# Patient Record
Sex: MALE | Race: WHITE | NOT HISPANIC OR LATINO | Employment: FULL TIME | URBAN - METROPOLITAN AREA
[De-identification: names, ages, dates, MRNs, and addresses within clinical notes are randomized per-mention and may not be internally consistent; named-entity substitution may affect disease eponyms.]

---

## 2017-02-09 ENCOUNTER — GENERIC CONVERSION - ENCOUNTER (OUTPATIENT)
Dept: OTHER | Facility: OTHER | Age: 52
End: 2017-02-09

## 2017-02-09 LAB
CHOLEST SERPL-MCNC: 130 MG/DL
CREAT UR-MCNC: 145 MG/DL
GLUCOSE SERPL-MCNC: 112 MG/DL
HBA1C MFR BLD HPLC: 6.2 %
HDLC SERPL-MCNC: 54 MG/DL
LDLC SERPL CALC-MCNC: 51 MG/DL
MAGNESIUM, UR (HISTORICAL): 0.5
MICROALBUMIN/CREATININE RATIO (HISTORICAL): 3
TRIGL SERPL-MCNC: 125 MG/DL

## 2017-02-14 ENCOUNTER — ALLSCRIPTS OFFICE VISIT (OUTPATIENT)
Dept: OTHER | Facility: OTHER | Age: 52
End: 2017-02-14

## 2017-05-04 ENCOUNTER — GENERIC CONVERSION - ENCOUNTER (OUTPATIENT)
Dept: OTHER | Facility: OTHER | Age: 52
End: 2017-05-04

## 2017-05-04 LAB
CHOLEST SERPL-MCNC: 112 MG/DL
GLUCOSE SERPL-MCNC: 111 MG/DL
HBA1C MFR BLD HPLC: 6 %
HDLC SERPL-MCNC: 56 MG/DL
LDLC SERPL CALC-MCNC: 43 MG/DL
TRIGL SERPL-MCNC: 66 MG/DL

## 2017-08-29 ENCOUNTER — ALLSCRIPTS OFFICE VISIT (OUTPATIENT)
Dept: OTHER | Facility: OTHER | Age: 52
End: 2017-08-29

## 2017-11-15 ENCOUNTER — ALLSCRIPTS OFFICE VISIT (OUTPATIENT)
Dept: OTHER | Facility: OTHER | Age: 52
End: 2017-11-15

## 2017-11-20 ENCOUNTER — GENERIC CONVERSION - ENCOUNTER (OUTPATIENT)
Dept: OTHER | Facility: OTHER | Age: 52
End: 2017-11-20

## 2017-11-20 LAB
A/G RATIO (HISTORICAL): 2.3 (ref 1.2–2.2)
ALBUMIN SERPL BCP-MCNC: 4.5 G/DL (ref 3.5–5.5)
ALP SERPL-CCNC: 58 IU/L (ref 39–117)
ALT SERPL W P-5'-P-CCNC: 24 IU/L (ref 0–44)
AST SERPL W P-5'-P-CCNC: 15 IU/L (ref 0–40)
BILIRUB SERPL-MCNC: 0.6 MG/DL (ref 0–1.2)
BUN SERPL-MCNC: 16 MG/DL (ref 6–24)
BUN/CREA RATIO (HISTORICAL): 16 (ref 9–20)
CALCIUM SERPL-MCNC: 9.7 MG/DL (ref 8.7–10.2)
CHLORIDE SERPL-SCNC: 101 MMOL/L (ref 96–106)
CHOLEST SERPL-MCNC: 136 MG/DL (ref 100–199)
CO2 SERPL-SCNC: 24 MMOL/L (ref 18–29)
CREAT SERPL-MCNC: 0.98 MG/DL (ref 0.76–1.27)
EGFR AFRICAN AMERICAN (HISTORICAL): 103 ML/MIN/1.73
EGFR-AMERICAN CALC (HISTORICAL): 89 ML/MIN/1.73
GLUCOSE SERPL-MCNC: 112 MG/DL (ref 65–99)
HDLC SERPL-MCNC: 57 MG/DL
INTERPRETATION (HISTORICAL): NORMAL
LDLC SERPL CALC-MCNC: 64 MG/DL (ref 0–99)
POTASSIUM SERPL-SCNC: 4.6 MMOL/L (ref 3.5–5.2)
PROSTATE SPECIFIC ANTIGEN (HISTORICAL): 1.4 NG/ML (ref 0–4)
SODIUM SERPL-SCNC: 140 MMOL/L (ref 134–144)
TOT. GLOBULIN, SERUM (HISTORICAL): 2 G/DL (ref 1.5–4.5)
TOTAL PROTEIN (HISTORICAL): 6.5 G/DL (ref 6–8.5)
TRIGL SERPL-MCNC: 75 MG/DL (ref 0–149)

## 2017-11-22 ENCOUNTER — GENERIC CONVERSION - ENCOUNTER (OUTPATIENT)
Dept: OTHER | Facility: OTHER | Age: 52
End: 2017-11-22

## 2018-01-12 NOTE — RESULT NOTES
Message   Please inform the patient that the 2 polyps removed during his recent colonoscopy show active inflammation  These may be water considered reactive polyps, with no cancer potential  I would recommend a repeat colonoscopy in 5 years given the unusual location of these polyps  I would also like to see the patient back in the office in 3 months time, in order to make sure that there are no symptoms of inflammation or colitis  Please have the patient call with any questions or concerns  Please put in for colonoscopy recall  Verified Results  (1) TISSUE EXAM [id number] 08:59AM Cuauhtemoc Pardo     Test Name Result Flag Reference   LAB AP CASE REPORT (Report)     Surgical Pathology Report             Case: [id number]                   Authorizing Provider: Helen Lopes MD      Collected:      08/31/2016 0859        Ordering Location:   [facility]  Received:      08/31/2016 [facility]                                     Pathologist:      Donald Mcdowell MD                                 Specimen:  Large Intestine, Cecum, Cecal polyps x2 cold snare and cold bx   LAB AP FINAL DIAGNOSIS      A  Cecum polyp x2 (polypectomy):  - Colonic mucosa with focal active colitis  - Negative for dysplasia (deeper levels examined)  Electronically signed by Donald Mcdowell MD on 9/2/2016 at 9:04 AM   LAB AP SURGICAL ADDITIONAL INFORMATION (Report)     These tests were developed and their performance characteristics   determined by [facility] or [facility]  They may not be cleared or approved by the [place]  Food and   Drug Administration  The FDA has determined that such clearance or   approval is not necessary  These tests are used for clinical purposes  They should not be regarded as investigational or for research  This   laboratory has been approved by [place] 88, designated as a high-complexity   laboratory and is qualified to perform these tests     LAB AP GROSS DESCRIPTION (Report)     A  The specimen is received in formalin, labeled with the patient's name   and medical record number, and is designated cecal polyps ? ?2 cold snare   biopsy, are 2 irregularly shaped fragments of tan-brown soft tissue   measuring 0 6 and 0 2 cm in greatest dimension  Entirely submitted  One   cassette  Note: The estimated total formalin fixation time based upon information   provided by the submitting clinician and the standard processing schedule   is 19 5 hours        RLR

## 2018-01-12 NOTE — PROGRESS NOTES
Assessment    1  Screening for deficiency anemia (V78 1) (Z13 0)   2  Screening for thyroid disorder (V77 0) (Z13 29)   3  Screening for prostate cancer (V76 44) (Z12 5)   4  History of Screening for lipoid disorders (V77 91) (Z13 220)   5  Screening for lipoid disorders (V77 91) (Z13 220)   6  Screening for diabetes mellitus (V77 1) (Z13 1)   7  Need for diphtheria-tetanus-pertussis (Tdap) vaccine, adult/adolescent (V06 1) (Z23)   8  Annual physical exam (V70 0) (Z00 00)   9  Non-smoker (V49 89) (Z78 9)   10  Screening for depression (V79 0) (Z13 89)    Plan   Need for diphtheria-tetanus-pertussis (Tdap) vaccine, adult/adolescent    · Adacel 5-2-15 5 LF-MCG/0 5 Intramuscular Suspension  Screening for depression    · *VB-Depression Screening; Status:Active; Requested WYK:57THO8659;   Screening for diabetes mellitus    · (1) HEMOGLOBIN A1C; Status:Resulted - Requires Verification;   Done: 74VAW4256  12:00AM  Screening for prostate cancer    · (1) PSA FREE & TOTAL; Status:Active; Requested for:22Jun2016;     (1) CBC/PLT/DIFF; Status:Resulted - Requires Verification;   Done: 63JGX9612 12:00AM  Due:22Jun2017;Ordered; For:Screening for deficiency anemia; Ordered By:Chacho Marie;   (1) COMPREHENSIVE METABOLIC PANEL; Status:Resulted - Requires Verification;   Done: 53OIL5815 12:00AM  Due:22Jun2017;Ordered; For:Gout; Ordered By:Chacho Marie;   (1) TSH; Status:Resulted - Requires Verification;   Done: 43FOS9709 12:00AM  Due:22Jun2017;Ordered; For:Screening for thyroid disorder; Ordered By:Chacho Marie;   (1) LIPID PANEL, FASTING; Status:Resulted - Requires Verification;   Done: 54SDW2661 12:00AM  Due:22Jun2017;Ordered; For:Screening for lipoid disorders; Ordered By:Chacho Marie;   (1) URIC ACID; Status:Resulted - Requires Verification;   Done: 12CMY8600 12:00AM  Due:22Jun2017;Ordered; For:Gout; Ordered By:Chayito Marie; Discussion/Summary  Impression: healthy adult male   Prostate cancer screening: PSA was ordered  Testicular cancer screening: testicular cancer screening is not indicated  Colorectal cancer screening: colonoscopy has been ordered  Screening lab work includes glucose and lipid profile  The immunizations will be given as outlined in the orders  Patient discussion: discussed with the patient, 30 minute visit, greater than half of the time was spent on counseling  1  Annual Physical: The patient is a healthy 48year old male who presents for an annual physical  I will check a CBC, CMP, TSH, Hemoglobin A1c, fasting lipid panel, PSA on this patient  2  Gout: I will check a uric acid level on this patient  Continue Allopurinol 300 mg PO daily  3  Return to office if not better  The patient was counseled regarding instructions for management, prognosis, patient and family education, impressions  total time of encounter was 30 minutes and 15 minutes was spent counseling  Chief Complaint  Patient presents to the office for a Physical Exam /cg      History of Present Illness  HM, Adult Male: The patient is being seen for a health maintenance evaluation  The last health maintenance visit was 1 year(s) ago  Social History: He is   Work status: working full time  The patient has never smoked cigarettes  He reports occasional alcohol use  The patient has no concerns about alcohol abuse  He has never used illicit drugs  General Health: The patient's health since the last visit is described as good  Immunizations status: not up to date   The patient is due for a Tdap vaccine  Lifestyle:  He does not use tobacco  He consumes alcohol  He denies drug use  Screening:   HPI: The patient is a 48year old male who presents to the office for an annual physical  The patient reports that he has not had a gout attack since his allopurinol had been increased  The patient feels overall well  The patient denies any complaints        Review of Systems    Constitutional: no fever, not feeling poorly, no chills and not feeling tired  Eyes: no eyesight problems, no dryness of the eyes and eyes not red  ENT: no sore throat, no hearing loss and no nasal discharge  Cardiovascular: no chest pain, no intermittent leg claudication, no palpitations and no extremity edema  Respiratory: no shortness of breath and no cough  Gastrointestinal: no abdominal pain, no nausea, no vomiting, no constipation and no diarrhea  Genitourinary: no dysuria and no incontinence  Musculoskeletal: no arthralgias, no joint swelling, no limb pain, no myalgias, no joint stiffness and no limb swelling  Integumentary: no rashes  Neurological: no headache, no numbness, no tingling, no dizziness, no limb weakness and no convulsions  Psychiatric: no anxiety and no depression  Endocrine: no proptosis and no hot flashes  Hematologic/Lymphatic: no swollen glands, no tendency for easy bleeding, no tendency for easy bruising and no swollen glands in the neck  Over the past 2 weeks, how often have you been bothered by the following problems? 1 ) Little interest or pleasure in doing things? Not at all    2 ) Feeling down, depressed or hopeless? Not at all  ROS reviewed  Active Problems    1  _ (719 46)   2  Acute gouty arthropathy (274 01) (M10 00)   3  Flu vaccine need (V04 81) (Z23)   4  Folliculitis (810 0) (H69 6)   5  Gout (274 9) (M10 9)   6  Knee swelling, left (719 06) (M25 462)   7  Screening for colorectal cancer (V76 51) (Z12 11,Z12 12)   8  Screening for deficiency anemia (V78 1) (Z13 0)   9  Screening for diabetes mellitus (V77 1) (Z13 1)   10  Screening for lipoid disorders (V77 91) (Z13 220)   11  Screening for prostate cancer (V76 44) (Z12 5)   12  Screening for thyroid disorder (V77 0) (Z13 29)   13   Shoulder Sprain (840 8)    Past Medical History    · History of Screening for lipoid disorders (V77 91) (Z13 220)    Family History  Mother    · Family history of diabetes mellitus (V18 0) (Z83 3)  Father    · Family history of diabetes mellitus (V18 0) (Z83 3)  Sister    · Family history of diabetes mellitus (V18 0) (Z83 3)    Social History    · Caffeine use (V49 89) (F15 90)   · Consumes alcohol occasionally (V49 89) (Z78 9)   · Former smoker (K10 39) (B65 431)   ·    · Non-smoker (V49 89) (Z78 9)    Current Meds   1  Allopurinol 300 MG Oral Tablet; TAKE 1 TABLET DAILY AS DIRECTED; Therapy: 62PGC3790 to (Evaluate:30Tpx3323)  Requested for: 86YIX0631; Last   Rx:14Jun2016 Ordered    Allergies    1  No Known Drug Allergies    Vitals   ** Printed in Appendix #1 below  Physical Exam    Constitutional   General appearance: No acute distress, well appearing and well nourished  Head and Face   Head and face: Normal     Eyes   Conjunctiva and lids: No erythema, swelling or discharge  Pupils and irises: Equal, round, reactive to light  Ophthalmoscopic examination: Normal fundi and optic discs  Ears, Nose, Mouth, and Throat   External inspection of ears and nose: Normal     Otoscopic examination: Tympanic membranes translucent with normal light reflex  Canals patent without erythema  Hearing: Normal     Nasal mucosa, septum, and turbinates: Normal without edema or erythema  Lips, teeth, and gums: Normal, good dentition  Oropharynx: Normal with no erythema, edema, exudate or lesions  Neck   Neck: Supple, symmetric, trachea midline, no masses  Thyroid: Normal, no thyromegaly  Pulmonary   Respiratory effort: No increased work of breathing or signs of respiratory distress  Auscultation of lungs: Clear to auscultation  Cardiovascular   Auscultation of heart: Normal rate and rhythm, normal S1 and S2, no murmurs  Carotid pulses: 2+ bilaterally  Peripheral vascular exam: Normal     Examination of extremities for edema and/or varicosities: Normal     Abdomen   Abdomen: Non-tender, no masses  Liver and spleen: No hepatomegaly or splenomegaly      Lymphatic Palpation of lymph nodes in neck: No lymphadenopathy  Musculoskeletal   Gait and station: Normal     Inspection/palpation of digits and nails: Normal without clubbing or cyanosis  Inspection/palpation of joints, bones, and muscles: Normal     Range of motion: Normal     Stability: Normal     Muscle strength/tone: Normal     Skin   Skin and subcutaneous tissue: Normal without rashes or lesions  Neurologic   Cranial nerves: Cranial nerves 2-12 intact  Cortical function: Normal mental status  Reflexes: 2+ and symmetric  Sensation: No sensory loss  Coordination: Normal finger to nose and heel to shin  Psychiatric   Judgment and insight: Normal     Orientation to person, place and time: Normal     Recent and remote memory: Intact  Mood and affect: Normal        Results/Data  (1) HEMOGLOBIN A1C [id number] 12:00AM Chacho Marie     Test Name Result Flag Reference   Hemoglobin A1c 6 7 % H 4 8-5 6   Pre-diabetes: 5 7 - 6 4           Diabetes: >6 4           Glycemic control for adults with diabetes: <7 0       Procedure    Procedure: Visual Acuity Test    Indication: routine screening  Results: 20/25 in both eyes without corrective device, 20/40 in the right eye without corrective device, 20/40 in the left eye without corrective device normal in both eyes  Did not have Eye Glasses at time of visit /cg   Color vision was reported by  and the results were normal    The patient was cooperative, but tolerated the procedure well  There were no complications        Signatures   Electronically signed by : Bryson Hassan DO; 2016  2:04PM EST                       (Author)    Appendix #1     Patient: Sav Claire ; : 1965; MRN: [id number]      Recorded: 47XQU4753 01:52PM Recorded: 53DIG4449 01:51PM Recorded: 85IRU8144 09:02AM   Temperature   97 8 F, Temporal   Heart Rate   80, L Radial   Pulse Quality   Normal, L Radial   Respiration   18   Respiration Quality   Normal   Systolic 339, LUE, Sitting 128, RUE, Sitting 160, LUE, Sitting   Diastolic 80, LUE, Sitting 82, RUE, Sitting 92, LUE, Sitting   Height   5 ft 8 75 in   Weight   216 lb    BMI Calculated   32 13   BSA Calculated   2 13

## 2018-01-13 VITALS
HEART RATE: 76 BPM | SYSTOLIC BLOOD PRESSURE: 122 MMHG | WEIGHT: 208 LBS | RESPIRATION RATE: 16 BRPM | HEIGHT: 70 IN | TEMPERATURE: 97.1 F | BODY MASS INDEX: 29.78 KG/M2 | DIASTOLIC BLOOD PRESSURE: 80 MMHG

## 2018-01-13 VITALS
HEIGHT: 70 IN | RESPIRATION RATE: 16 BRPM | HEART RATE: 80 BPM | BODY MASS INDEX: 29.35 KG/M2 | DIASTOLIC BLOOD PRESSURE: 80 MMHG | TEMPERATURE: 97.3 F | WEIGHT: 205 LBS | SYSTOLIC BLOOD PRESSURE: 130 MMHG

## 2018-01-14 VITALS
DIASTOLIC BLOOD PRESSURE: 84 MMHG | WEIGHT: 206 LBS | RESPIRATION RATE: 12 BRPM | SYSTOLIC BLOOD PRESSURE: 144 MMHG | TEMPERATURE: 97 F | HEIGHT: 70 IN | BODY MASS INDEX: 29.49 KG/M2 | HEART RATE: 76 BPM

## 2018-01-18 NOTE — RESULT NOTES
Discussion/Summary   Your labs are stable  Please follow up as we discussed at your last appointment  Dr Kirk Aguilera      Verified Results  (1) COMPREHENSIVE METABOLIC PANEL 25OVO8056 80:95ZH Leif Marie     Test Name Result Flag Reference   Glucose, Serum 112 mg/dL H 65-99   BUN 16 mg/dL  6-24   Creatinine, Serum 0 98 mg/dL  0 76-1 27   BUN/Creatinine Ratio 16  9-20   Sodium, Serum 140 mmol/L  134-144   Potassium, Serum 4 6 mmol/L  3 5-5 2   Chloride, Serum 101 mmol/L     Carbon Dioxide, Total 24 mmol/L  18-29   Calcium, Serum 9 7 mg/dL  8 7-10 2   Protein, Total, Serum 6 5 g/dL  6 0-8 5   Albumin, Serum 4 5 g/dL  3 5-5 5   Globulin, Total 2 0 g/dL  1 5-4 5   A/G Ratio 2 3 H 1 2-2 2   Bilirubin, Total 0 6 mg/dL  0 0-1 2   Alkaline Phosphatase, S 58 IU/L     AST (SGOT) 15 IU/L  0-40   ALT (SGPT) 24 IU/L  0-44   eGFR If NonAfricn Am 89 mL/min/1 73  >59   eGFR If Africn Am 103 mL/min/1 73  >59     (1) LIPID PANEL FASTING W DIRECT LDL REFLEX [id number] 08:54AM Leif Salazar     Test Name Result Flag Reference   Cholesterol, Total 136 mg/dL  100-199   Triglycerides 75 mg/dL  0-149   HDL Cholesterol 57 mg/dL  >39   LDL Cholesterol Calc 64 mg/dL  0-99     (1) PSA (SCREEN) (Dx V76 44 Screen for Prostate Cancer) [id number] 08:54AM Leif Salazar     Test Name Result Flag Reference   Prostate Specific Ag, Serum 1 4 ng/mL  0 0-4 0   Roche ECLIA methodology  According to the American Urological Association, Serum PSA should  decrease and remain at undetectable levels after radical  prostatectomy  The AUA defines biochemical recurrence as an initial  PSA value 0 2 ng/mL or greater followed by a subsequent confirmatory  PSA value 0 2 ng/mL or greater  Values obtained with different assay methods or kits cannot be used  interchangeably  Results cannot be interpreted as absolute evidence  of the presence or absence of malignant disease       [facility]) Cardiovascular Risk Assessment 20Nov2017 08:54AM Leif Salazar Test Name Result Flag Reference   Interpretation Note     Supplemental report is available  PDF Image   Plan  Diabetes mellitus type II, controlled    · (1) GLUCOSE, RANDOM ; every 3 months;  Next [id number]; Status:Active

## 2018-02-28 DIAGNOSIS — E79.0 HYPERURICEMIA: Primary | ICD-10-CM

## 2018-02-28 RX ORDER — ALLOPURINOL 300 MG/1
1 TABLET ORAL DAILY
COMMUNITY
Start: 2016-06-14 | End: 2018-10-10 | Stop reason: SDUPTHER

## 2018-02-28 RX ORDER — LISINOPRIL 2.5 MG/1
TABLET ORAL
COMMUNITY
Start: 2016-12-14 | End: 2018-10-10 | Stop reason: SDUPTHER

## 2018-02-28 RX ORDER — SIMVASTATIN 40 MG
1 TABLET ORAL DAILY
COMMUNITY
Start: 2016-07-02 | End: 2018-10-10 | Stop reason: SDUPTHER

## 2018-02-28 RX ORDER — IBUPROFEN 800 MG/1
1 TABLET ORAL EVERY 8 HOURS
COMMUNITY
Start: 2017-08-29 | End: 2018-10-10

## 2018-02-28 RX ORDER — LISINOPRIL 2.5 MG/1
1 TABLET ORAL DAILY
COMMUNITY
Start: 2016-12-15 | End: 2018-06-11 | Stop reason: SDUPTHER

## 2018-02-28 NOTE — TELEPHONE ENCOUNTER
Dr Madalyn Bassett    Patient is requesting refill alpurinol 300 mg, 1 x daily, 90 day supply sent to [facility]  Patient was advised that Dr Madalyn Bassett is not in the office today

## 2018-03-01 RX ORDER — ALLOPURINOL 300 MG/1
300 TABLET ORAL DAILY
Qty: 30 TABLET | Refills: 3 | Status: SHIPPED | OUTPATIENT
Start: 2018-03-01 | End: 2018-06-11 | Stop reason: SDUPTHER

## 2018-06-11 DIAGNOSIS — E11.8 TYPE 2 DIABETES MELLITUS WITH COMPLICATION, WITHOUT LONG-TERM CURRENT USE OF INSULIN (HCC): Primary | ICD-10-CM

## 2018-06-11 DIAGNOSIS — I10 BENIGN ESSENTIAL HTN: ICD-10-CM

## 2018-06-11 DIAGNOSIS — E79.0 HYPERURICEMIA: ICD-10-CM

## 2018-06-11 DIAGNOSIS — E78.2 MIXED HYPERLIPIDEMIA: ICD-10-CM

## 2018-06-12 PROCEDURE — 4010F ACE/ARB THERAPY RXD/TAKEN: CPT | Performed by: FAMILY MEDICINE

## 2018-06-12 RX ORDER — LISINOPRIL 2.5 MG/1
2.5 TABLET ORAL DAILY
Qty: 90 TABLET | Refills: 0 | Status: SHIPPED | OUTPATIENT
Start: 2018-06-12 | End: 2018-10-10

## 2018-06-12 RX ORDER — ALLOPURINOL 300 MG/1
300 TABLET ORAL DAILY
Qty: 90 TABLET | Refills: 0 | Status: SHIPPED | OUTPATIENT
Start: 2018-06-12 | End: 2018-10-10

## 2018-06-12 RX ORDER — SIMVASTATIN 40 MG
40 TABLET ORAL DAILY
Qty: 90 TABLET | Refills: 0 | Status: SHIPPED | OUTPATIENT
Start: 2018-06-12 | End: 2018-10-10

## 2018-10-10 ENCOUNTER — OFFICE VISIT (OUTPATIENT)
Dept: FAMILY MEDICINE CLINIC | Facility: CLINIC | Age: 53
End: 2018-10-10
Payer: COMMERCIAL

## 2018-10-10 VITALS
SYSTOLIC BLOOD PRESSURE: 128 MMHG | RESPIRATION RATE: 16 BRPM | HEIGHT: 70 IN | HEART RATE: 80 BPM | DIASTOLIC BLOOD PRESSURE: 80 MMHG | WEIGHT: 212.2 LBS | TEMPERATURE: 98.7 F | BODY MASS INDEX: 30.38 KG/M2

## 2018-10-10 DIAGNOSIS — M1A.49X0 OTHER SECONDARY CHRONIC GOUT OF MULTIPLE SITES WITHOUT TOPHUS: ICD-10-CM

## 2018-10-10 DIAGNOSIS — Z12.5 SCREENING PSA (PROSTATE SPECIFIC ANTIGEN): ICD-10-CM

## 2018-10-10 DIAGNOSIS — E78.2 MIXED HYPERLIPIDEMIA: ICD-10-CM

## 2018-10-10 DIAGNOSIS — I10 BENIGN HYPERTENSION: ICD-10-CM

## 2018-10-10 DIAGNOSIS — E11.9 CONTROLLED TYPE 2 DIABETES MELLITUS WITHOUT COMPLICATION, WITH LONG-TERM CURRENT USE OF INSULIN (HCC): Primary | ICD-10-CM

## 2018-10-10 DIAGNOSIS — Z79.4 CONTROLLED TYPE 2 DIABETES MELLITUS WITHOUT COMPLICATION, WITH LONG-TERM CURRENT USE OF INSULIN (HCC): Primary | ICD-10-CM

## 2018-10-10 PROBLEM — M54.16 LUMBAR RADICULOPATHY: Status: ACTIVE | Noted: 2017-08-29

## 2018-10-10 LAB — SL AMB POCT HEMOGLOBIN AIC: 5.9

## 2018-10-10 PROCEDURE — 1036F TOBACCO NON-USER: CPT | Performed by: FAMILY MEDICINE

## 2018-10-10 PROCEDURE — 83036 HEMOGLOBIN GLYCOSYLATED A1C: CPT | Performed by: FAMILY MEDICINE

## 2018-10-10 PROCEDURE — 99214 OFFICE O/P EST MOD 30 MIN: CPT | Performed by: FAMILY MEDICINE

## 2018-10-10 PROCEDURE — 3044F HG A1C LEVEL LT 7.0%: CPT | Performed by: FAMILY MEDICINE

## 2018-10-10 PROCEDURE — 4010F ACE/ARB THERAPY RXD/TAKEN: CPT | Performed by: FAMILY MEDICINE

## 2018-10-10 RX ORDER — LISINOPRIL 2.5 MG/1
2.5 TABLET ORAL DAILY
Qty: 90 TABLET | Refills: 2 | Status: SHIPPED | OUTPATIENT
Start: 2018-10-10 | End: 2019-06-11 | Stop reason: SDUPTHER

## 2018-10-10 RX ORDER — ALLOPURINOL 300 MG/1
300 TABLET ORAL DAILY
Qty: 90 TABLET | Refills: 2 | Status: SHIPPED | OUTPATIENT
Start: 2018-10-10 | End: 2019-06-11 | Stop reason: SDUPTHER

## 2018-10-10 RX ORDER — SIMVASTATIN 40 MG
40 TABLET ORAL DAILY
Qty: 90 TABLET | Refills: 2 | Status: SHIPPED | OUTPATIENT
Start: 2018-10-10 | End: 2019-06-11 | Stop reason: SDUPTHER

## 2018-10-10 NOTE — PROGRESS NOTES
Nelia Gonzáles 1965 male MRN: [id number]    [facility]  Follow-up Visit    ASSESSMENT/PLAN  Nelia Gonzáles is a 46 y o  male with significant PmhX of anemia, hypertension, diabetes, gout presents to the office for     Controlled type 2 diabetes mellitus without complication, with long-term current use of insulin ([facility] [address] )  Comments:  - Currently well controlled A1c of 5 9%  Will Hold Metformin 500mg  for 6 months to see if the patient is able to maintain A1c < 6 5%  Has only had 1 A1c above 6 5% ->6 7%  - Refused Pneumovax and Flu vaccine  - Eye Exam: UTD  - Diabetic Foot exam: performed today WNL  Orders:  -     POCT hemoglobin A1c  -     Microalbumin / creatinine urine ratio  -     Comprehensive metabolic panel; Future  -     CBC and differential; Future    Mixed hyperlipidemia  Comments:  -Continue on Simvastatin 40mg  - Repeat FLP  Orders:  -     Lipid panel; Future  -     simvastatin (ZOCOR) 40 mg tablet; Take 1 tablet (40 mg total) by mouth daily    Benign hypertension  Comments:  - Well controlled on Lisinopril 2 5mg daily  -CMP/CBC ordered  Orders:  -     Comprehensive metabolic panel; Future  -     CBC and differential; Future  -     lisinopril (ZESTRIL) 2 5 mg tablet; Take 1 tablet (2 5 mg total) by mouth daily    Other secondary chronic gout of multiple sites without tophus  Comments:  - Controlled on Allopurinol 300mg daily  Orders:  -     allopurinol (ZYLOPRIM) 300 mg tablet; Take 1 tablet (300 mg total) by mouth daily    Screening PSA (prostate specific antigen)  Comments:  - Prostate exam at 55 needed  Orders:  -     PSA Total, Diagnostic; Future        Disposition: Return to the clinic in 6 months to evaluate the patient's A1c    Following Specialist Providers:   Opthalmology: Recent Uveitis infection; cleared    Health Maintence: 1  Colonoscopy: Due in 2021; Polyps found, and diverticulosis in 2016  2   Vaccines: Declined at this time; Pneumonia; Flu  -Counseling on Weight loss: Encourage 30 minutes of accumulated moderate-intensity physical activity on 5 or more days per week  No future appointments  SUBJECTIVE  CC: Diabetes Type 2      HPI:  Karan Parisi is a 46 y o  male with significant past medical history of diabetes type 2, hyperlipidemia, hypertension, gout presenting to the office for a follow-up after 1 year  Patient states over the year he has only had 1 infection of uveitis and was treated and worked up by his ophthalmologist   Patient states that he has a very well controlled diabetic/gout diet  At times he does cheat and has sweets and beer but in small amounts  Patient denies any chest pain, shortness of breath, or any other complaints at this time  -diabetes he takes 500 mg of metformin daily  He would like to be off medications but if he needs to be on and he will be okay with it   -hyperlipidemia:  Takes simvastatin 40 mg without any difficulties   -gout:  History of having his right foot and right knee affected a year ago  Has been controlled on allopurinol 300 mg since then   -denies any prostate concerns urinary stream within normal limits  -hypertension usually elevated when he sees the doctors but takes his lisinopril 2 5 mg without any difficulties  Review of Systems   Constitutional: Negative for activity change, appetite change, chills, fatigue and fever  HENT: Negative for congestion  Eyes: Negative for visual disturbance  Respiratory: Negative for cough, chest tightness and shortness of breath  Cardiovascular: Negative for chest pain and leg swelling  Gastrointestinal: Negative for abdominal distention, abdominal pain, constipation, diarrhea, nausea and vomiting  Genitourinary: Negative for difficulty urinating  Musculoskeletal: Negative for arthralgias  Allergic/Immunologic: Negative for environmental allergies  Neurological: Negative for dizziness, light-headedness and headaches     All other systems reviewed and are negative  Historical Information   The patient history was reviewed as follows:    Past Medical History:   Diagnosis Date    Diabetes mellitus ([address] )     type 2    Gout     Hypercholesteremia      Past Surgical History:   Procedure Laterality Date    APPENDECTOMY      COLONOSCOPY N/A 8/31/2016    Procedure: COLONOSCOPY;  Surgeon: Elroy Jorge MD;  Location: [facility]; Service:      History reviewed  No pertinent family history  Social History   History   Alcohol Use    Yes     Comment: beer two to three times per week     History   Drug use: Unknown     History   Smoking Status    Former Smoker   Smokeless Tobacco    Never Used     Comment: quit 10 years ago       Medications:     Current Outpatient Prescriptions:     allopurinol (ZYLOPRIM) 300 mg tablet, Take 1 tablet (300 mg total) by mouth daily, Disp: 90 tablet, Rfl: 2    lisinopril (ZESTRIL) 2 5 mg tablet, Take 1 tablet (2 5 mg total) by mouth daily, Disp: 90 tablet, Rfl: 2    metFORMIN (GLUCOPHAGE) 500 mg tablet, Take 1 tablet by mouth daily, Disp: , Rfl:     simvastatin (ZOCOR) 40 mg tablet, Take 1 tablet (40 mg total) by mouth daily, Disp: 90 tablet, Rfl: 2  No Known Allergies    OBJECTIVE    Vitals:   Vitals:    10/10/18 0811   BP: 128/80   BP Location: Left arm   Patient Position: Sitting   Cuff Size: Standard   Pulse: 80   Resp: 16   Temp: 98 7 °F (37 1 °C)   Weight: 96 3 kg (212 lb 3 2 oz)   Height: 5' 10" (1 778 m)           Physical Exam   Constitutional: He is oriented to person, place, and time  Vital signs are normal  He appears well-developed and well-nourished  HENT:   Head: Normocephalic and atraumatic  Right Ear: Hearing normal    Left Ear: Hearing normal    Eyes: Pupils are equal, round, and reactive to light  Conjunctivae and EOM are normal    Neck: Normal range of motion  Neck supple     Cardiovascular: Normal rate, regular rhythm, S1 normal, S2 normal, normal heart sounds and intact distal pulses  Pulses are no weak pulses  No murmur heard  Pulses:       Dorsalis pedis pulses are 2+ on the right side, and 2+ on the left side  Posterior tibial pulses are 2+ on the right side, and 2+ on the left side  Pulmonary/Chest: Effort normal and breath sounds normal  No respiratory distress  He has no wheezes  Abdominal: Soft  Bowel sounds are normal  He exhibits no distension  There is no tenderness  Musculoskeletal: Normal range of motion  He exhibits no edema  Feet:   Right Foot:   Skin Integrity: Negative for ulcer, skin breakdown, erythema, warmth, callus or dry skin  Left Foot:   Skin Integrity: Negative for ulcer, skin breakdown, erythema, warmth, callus or dry skin  Neurological: He is alert and oriented to person, place, and time  He has normal strength  Skin: Skin is warm  No rash noted  Psychiatric: He has a normal mood and affect  His speech is normal and behavior is normal  Judgment and thought content normal    Vitals reviewed  Patient's shoes and socks removed  Right Foot/Ankle   Right Foot Inspection  Skin Exam: skin normal and skin intact no dry skin, no warmth, no callus, no erythema, no maceration, no abnormal color, no pre-ulcer, no ulcer and no callus                          Toe Exam: ROM and strength within normal limitsno swelling  Sensory       Monofilament testing: intact  Vascular  Capillary refills: < 3 seconds  The right DP pulse is 2+  The right PT pulse is 2+  Left Foot/Ankle  Left Foot Inspection  Skin Exam: skin normal and skin intactno dry skin, no warmth, no erythema, no maceration, normal color, no pre-ulcer, no ulcer and no callus                         Toe Exam: ROM and strength within normal limitsno swelling                   Sensory       Monofilament: intact  Vascular  Capillary refills: < 3 seconds  The left DP pulse is 2+  The left PT pulse is 2+  Assign Risk Category:  No deformity present;  No loss of protective sensation; No weak pulses       Risk: 0    Meggan Dominguez MD  [facility]

## 2018-10-13 LAB
ALBUMIN SERPL-MCNC: 4.4 G/DL (ref 3.5–5.5)
ALBUMIN/CREAT UR: <8.6 MG/G CREAT (ref 0–30)
ALBUMIN/GLOB SERPL: 2 {RATIO} (ref 1.2–2.2)
ALP SERPL-CCNC: 54 IU/L (ref 39–117)
ALT SERPL-CCNC: 24 IU/L (ref 0–44)
AST SERPL-CCNC: 17 IU/L (ref 0–40)
BASOPHILS # BLD AUTO: 0.1 X10E3/UL (ref 0–0.2)
BASOPHILS NFR BLD AUTO: 1 %
BILIRUB SERPL-MCNC: 0.6 MG/DL (ref 0–1.2)
BUN SERPL-MCNC: 14 MG/DL (ref 6–24)
BUN/CREAT SERPL: 14 (ref 9–20)
CALCIUM SERPL-MCNC: 9.7 MG/DL (ref 8.7–10.2)
CHLORIDE SERPL-SCNC: 103 MMOL/L (ref 96–106)
CHOLEST SERPL-MCNC: 145 MG/DL (ref 100–199)
CO2 SERPL-SCNC: 22 MMOL/L (ref 20–29)
CREAT SERPL-MCNC: 1.02 MG/DL (ref 0.76–1.27)
CREAT UR-MCNC: 35 MG/DL
EOSINOPHIL # BLD AUTO: 0.4 X10E3/UL (ref 0–0.4)
EOSINOPHIL NFR BLD AUTO: 5 %
ERYTHROCYTE [DISTWIDTH] IN BLOOD BY AUTOMATED COUNT: 14.3 % (ref 12.3–15.4)
GLOBULIN SER-MCNC: 2.2 G/DL (ref 1.5–4.5)
GLUCOSE SERPL-MCNC: 120 MG/DL (ref 65–99)
HCT VFR BLD AUTO: 43.7 % (ref 37.5–51)
HDLC SERPL-MCNC: 54 MG/DL
HGB BLD-MCNC: 15 G/DL (ref 13–17.7)
IMM GRANULOCYTES # BLD: 0 X10E3/UL (ref 0–0.1)
IMM GRANULOCYTES NFR BLD: 1 %
LABCORP COMMENT: NORMAL
LDLC SERPL CALC-MCNC: 62 MG/DL (ref 0–99)
LYMPHOCYTES # BLD AUTO: 2 X10E3/UL (ref 0.7–3.1)
LYMPHOCYTES NFR BLD AUTO: 29 %
MCH RBC QN AUTO: 31.4 PG (ref 26.6–33)
MCHC RBC AUTO-ENTMCNC: 34.3 G/DL (ref 31.5–35.7)
MCV RBC AUTO: 91 FL (ref 79–97)
MICROALBUMIN UR-MCNC: <3 UG/ML
MICRODELETION SYND BLD/T FISH: NORMAL
MONOCYTES # BLD AUTO: 0.5 X10E3/UL (ref 0.1–0.9)
MONOCYTES NFR BLD AUTO: 8 %
NEUTROPHILS # BLD AUTO: 4 X10E3/UL (ref 1.4–7)
NEUTROPHILS NFR BLD AUTO: 56 %
PLATELET # BLD AUTO: 210 X10E3/UL (ref 150–379)
POTASSIUM SERPL-SCNC: 4.4 MMOL/L (ref 3.5–5.2)
PROT SERPL-MCNC: 6.6 G/DL (ref 6–8.5)
PSA SERPL-MCNC: 1.2 NG/ML (ref 0–4)
RBC # BLD AUTO: 4.78 X10E6/UL (ref 4.14–5.8)
SL AMB EGFR AFRICAN AMERICAN: 97 ML/MIN/1.73
SL AMB EGFR NON AFRICAN AMERICAN: 84 ML/MIN/1.73
SL AMB VLDL CHOLESTEROL CALC: 29 MG/DL (ref 5–40)
SODIUM SERPL-SCNC: 141 MMOL/L (ref 134–144)
TRIGL SERPL-MCNC: 143 MG/DL (ref 0–149)
WBC # BLD AUTO: 7 X10E3/UL (ref 3.4–10.8)

## 2018-10-15 ENCOUNTER — TELEPHONE (OUTPATIENT)
Dept: FAMILY MEDICINE CLINIC | Facility: CLINIC | Age: 53
End: 2018-10-15

## 2018-10-15 NOTE — TELEPHONE ENCOUNTER
----- Message from Celia Westbrook MD sent at 10/15/2018 12:29 PM EDT -----  Please advise the patient of the following    1) No signs of Anemia  2) kidney and liver look good  Cholesterol looks good  Prostate screening negative

## 2018-10-15 NOTE — PROGRESS NOTES
Please advise the patient of the following    1) No signs of Anemia  2) kidney and liver look good  Cholesterol looks good  Prostate screening negative

## 2019-05-11 ENCOUNTER — TELEPHONE (OUTPATIENT)
Dept: FAMILY MEDICINE CLINIC | Facility: CLINIC | Age: 54
End: 2019-05-11

## 2019-06-11 ENCOUNTER — OFFICE VISIT (OUTPATIENT)
Dept: FAMILY MEDICINE CLINIC | Facility: CLINIC | Age: 54
End: 2019-06-11
Payer: COMMERCIAL

## 2019-06-11 VITALS
HEART RATE: 82 BPM | RESPIRATION RATE: 16 BRPM | HEIGHT: 70 IN | WEIGHT: 211.8 LBS | DIASTOLIC BLOOD PRESSURE: 80 MMHG | TEMPERATURE: 98.6 F | SYSTOLIC BLOOD PRESSURE: 140 MMHG | BODY MASS INDEX: 30.32 KG/M2

## 2019-06-11 DIAGNOSIS — E78.2 MIXED HYPERLIPIDEMIA: ICD-10-CM

## 2019-06-11 DIAGNOSIS — I10 BENIGN HYPERTENSION: ICD-10-CM

## 2019-06-11 DIAGNOSIS — E11.9 CONTROLLED TYPE 2 DIABETES MELLITUS WITHOUT COMPLICATION, WITHOUT LONG-TERM CURRENT USE OF INSULIN (HCC): Primary | ICD-10-CM

## 2019-06-11 DIAGNOSIS — M1A.49X0 OTHER SECONDARY CHRONIC GOUT OF MULTIPLE SITES WITHOUT TOPHUS: ICD-10-CM

## 2019-06-11 LAB — SL AMB POCT HEMOGLOBIN AIC: 6.3 (ref ?–6.5)

## 2019-06-11 PROCEDURE — 3008F BODY MASS INDEX DOCD: CPT | Performed by: FAMILY MEDICINE

## 2019-06-11 PROCEDURE — 99214 OFFICE O/P EST MOD 30 MIN: CPT | Performed by: FAMILY MEDICINE

## 2019-06-11 PROCEDURE — 4010F ACE/ARB THERAPY RXD/TAKEN: CPT | Performed by: FAMILY MEDICINE

## 2019-06-11 PROCEDURE — 1036F TOBACCO NON-USER: CPT | Performed by: FAMILY MEDICINE

## 2019-06-11 PROCEDURE — 3044F HG A1C LEVEL LT 7.0%: CPT | Performed by: FAMILY MEDICINE

## 2019-06-11 PROCEDURE — 83036 HEMOGLOBIN GLYCOSYLATED A1C: CPT | Performed by: FAMILY MEDICINE

## 2019-06-11 RX ORDER — SIMVASTATIN 40 MG
40 TABLET ORAL DAILY
Qty: 90 TABLET | Refills: 2 | Status: SHIPPED | OUTPATIENT
Start: 2019-06-11 | End: 2019-10-01 | Stop reason: SDUPTHER

## 2019-06-11 RX ORDER — ALLOPURINOL 300 MG/1
300 TABLET ORAL DAILY
Qty: 90 TABLET | Refills: 2 | Status: SHIPPED | OUTPATIENT
Start: 2019-06-11 | End: 2019-10-01 | Stop reason: SDUPTHER

## 2019-06-11 RX ORDER — LISINOPRIL 2.5 MG/1
2.5 TABLET ORAL DAILY
Qty: 90 TABLET | Refills: 2 | Status: SHIPPED | OUTPATIENT
Start: 2019-06-11 | End: 2019-10-01 | Stop reason: SDUPTHER

## 2019-10-01 DIAGNOSIS — E78.2 MIXED HYPERLIPIDEMIA: ICD-10-CM

## 2019-10-01 DIAGNOSIS — M1A.49X0 OTHER SECONDARY CHRONIC GOUT OF MULTIPLE SITES WITHOUT TOPHUS: ICD-10-CM

## 2019-10-01 DIAGNOSIS — I10 BENIGN HYPERTENSION: ICD-10-CM

## 2019-10-01 RX ORDER — ALLOPURINOL 300 MG/1
300 TABLET ORAL DAILY
Qty: 90 TABLET | Refills: 0 | Status: SHIPPED | OUTPATIENT
Start: 2019-10-01 | End: 2020-01-08 | Stop reason: SDUPTHER

## 2019-10-01 RX ORDER — SIMVASTATIN 40 MG
40 TABLET ORAL DAILY
Qty: 90 TABLET | Refills: 0 | Status: SHIPPED | OUTPATIENT
Start: 2019-10-01 | End: 2019-12-11 | Stop reason: SDUPTHER

## 2019-10-01 RX ORDER — LISINOPRIL 2.5 MG/1
2.5 TABLET ORAL DAILY
Qty: 90 TABLET | Refills: 0 | Status: SHIPPED | OUTPATIENT
Start: 2019-10-01 | End: 2020-01-08 | Stop reason: SDUPTHER

## 2019-11-04 DIAGNOSIS — E78.2 MIXED HYPERLIPIDEMIA: Primary | ICD-10-CM

## 2019-11-04 DIAGNOSIS — Z12.5 SCREENING PSA (PROSTATE SPECIFIC ANTIGEN): ICD-10-CM

## 2019-11-04 DIAGNOSIS — I10 BENIGN HYPERTENSION: ICD-10-CM

## 2019-11-04 DIAGNOSIS — E11.9 CONTROLLED TYPE 2 DIABETES MELLITUS WITHOUT COMPLICATION, WITHOUT LONG-TERM CURRENT USE OF INSULIN (HCC): ICD-10-CM

## 2019-11-21 LAB
ALBUMIN SERPL-MCNC: 4.4 G/DL (ref 3.5–5.5)
ALBUMIN/CREAT UR: 16.6 MG/G CREAT (ref 0–30)
ALBUMIN/GLOB SERPL: 2.3 {RATIO} (ref 1.2–2.2)
ALP SERPL-CCNC: 55 IU/L (ref 39–117)
ALT SERPL-CCNC: 28 IU/L (ref 0–44)
AST SERPL-CCNC: 17 IU/L (ref 0–40)
BASOPHILS # BLD AUTO: 0 X10E3/UL (ref 0–0.2)
BASOPHILS NFR BLD AUTO: 1 %
BILIRUB SERPL-MCNC: 0.7 MG/DL (ref 0–1.2)
BUN SERPL-MCNC: 14 MG/DL (ref 6–24)
BUN/CREAT SERPL: 13 (ref 9–20)
CALCIUM SERPL-MCNC: 9.8 MG/DL (ref 8.7–10.2)
CHLORIDE SERPL-SCNC: 100 MMOL/L (ref 96–106)
CHOLEST SERPL-MCNC: 124 MG/DL (ref 100–199)
CO2 SERPL-SCNC: 24 MMOL/L (ref 20–29)
CREAT SERPL-MCNC: 1.04 MG/DL (ref 0.76–1.27)
CREAT UR-MCNC: 32.6 MG/DL
EOSINOPHIL # BLD AUTO: 0.3 X10E3/UL (ref 0–0.4)
EOSINOPHIL NFR BLD AUTO: 5 %
ERYTHROCYTE [DISTWIDTH] IN BLOOD BY AUTOMATED COUNT: 13.4 % (ref 12.3–15.4)
GLOBULIN SER-MCNC: 1.9 G/DL (ref 1.5–4.5)
GLUCOSE SERPL-MCNC: 125 MG/DL (ref 65–99)
HBA1C MFR BLD: 6.2 % (ref 4.8–5.6)
HCT VFR BLD AUTO: 45.2 % (ref 37.5–51)
HDLC SERPL-MCNC: 50 MG/DL
HGB BLD-MCNC: 15.6 G/DL (ref 13–17.7)
IMM GRANULOCYTES # BLD: 0 X10E3/UL (ref 0–0.1)
IMM GRANULOCYTES NFR BLD: 1 %
LDLC SERPL CALC-MCNC: 59 MG/DL (ref 0–99)
LYMPHOCYTES # BLD AUTO: 1.7 X10E3/UL (ref 0.7–3.1)
LYMPHOCYTES NFR BLD AUTO: 26 %
MCH RBC QN AUTO: 31.5 PG (ref 26.6–33)
MCHC RBC AUTO-ENTMCNC: 34.5 G/DL (ref 31.5–35.7)
MCV RBC AUTO: 91 FL (ref 79–97)
MICROALBUMIN UR-MCNC: 5.4 UG/ML
MICRODELETION SYND BLD/T FISH: NORMAL
MONOCYTES # BLD AUTO: 0.5 X10E3/UL (ref 0.1–0.9)
MONOCYTES NFR BLD AUTO: 8 %
NEUTROPHILS # BLD AUTO: 3.9 X10E3/UL (ref 1.4–7)
NEUTROPHILS NFR BLD AUTO: 59 %
PLATELET # BLD AUTO: 217 X10E3/UL (ref 150–450)
POTASSIUM SERPL-SCNC: 4.7 MMOL/L (ref 3.5–5.2)
PROT SERPL-MCNC: 6.3 G/DL (ref 6–8.5)
PSA SERPL-MCNC: 1.2 NG/ML (ref 0–4)
RBC # BLD AUTO: 4.95 X10E6/UL (ref 4.14–5.8)
SL AMB EGFR AFRICAN AMERICAN: 94 ML/MIN/1.73
SL AMB EGFR NON AFRICAN AMERICAN: 82 ML/MIN/1.73
SL AMB VLDL CHOLESTEROL CALC: 15 MG/DL (ref 5–40)
SODIUM SERPL-SCNC: 138 MMOL/L (ref 134–144)
TRIGL SERPL-MCNC: 73 MG/DL (ref 0–149)
WBC # BLD AUTO: 6.5 X10E3/UL (ref 3.4–10.8)

## 2019-11-26 ENCOUNTER — TELEPHONE (OUTPATIENT)
Dept: FAMILY MEDICINE CLINIC | Facility: CLINIC | Age: 54
End: 2019-11-26

## 2019-11-26 NOTE — TELEPHONE ENCOUNTER
----- Message from Nava Pelaez MD sent at 11/26/2019  3:58 PM EST -----  Please advise the patient of the following  1  No signs of anemia  2  Cholesterol showing improvement  3  A1c showing slight improvement; therefore we can continue holding metformin as previously discussed  4  urine diabetic check within normal limits (microalbumin)  5   Prostate levels within normal range

## 2019-12-11 ENCOUNTER — VBI (OUTPATIENT)
Dept: FAMILY MEDICINE CLINIC | Facility: CLINIC | Age: 54
End: 2019-12-11

## 2019-12-11 ENCOUNTER — TELEPHONE (OUTPATIENT)
Dept: FAMILY MEDICINE CLINIC | Facility: CLINIC | Age: 54
End: 2019-12-11

## 2019-12-11 ENCOUNTER — OFFICE VISIT (OUTPATIENT)
Dept: FAMILY MEDICINE CLINIC | Facility: CLINIC | Age: 54
End: 2019-12-11
Payer: COMMERCIAL

## 2019-12-11 VITALS
DIASTOLIC BLOOD PRESSURE: 80 MMHG | TEMPERATURE: 97.6 F | BODY MASS INDEX: 30.81 KG/M2 | HEART RATE: 78 BPM | RESPIRATION RATE: 14 BRPM | WEIGHT: 208 LBS | HEIGHT: 69 IN | SYSTOLIC BLOOD PRESSURE: 130 MMHG

## 2019-12-11 DIAGNOSIS — E11.9 CONTROLLED TYPE 2 DIABETES MELLITUS WITHOUT COMPLICATION, WITHOUT LONG-TERM CURRENT USE OF INSULIN (HCC): Primary | ICD-10-CM

## 2019-12-11 DIAGNOSIS — M1A.49X0 OTHER SECONDARY CHRONIC GOUT OF MULTIPLE SITES WITHOUT TOPHUS: ICD-10-CM

## 2019-12-11 DIAGNOSIS — E78.2 MIXED HYPERLIPIDEMIA: ICD-10-CM

## 2019-12-11 DIAGNOSIS — D17.9 MULTIPLE LIPOMAS: ICD-10-CM

## 2019-12-11 DIAGNOSIS — H20.13 CHRONIC UVEITIS OF BOTH EYES: ICD-10-CM

## 2019-12-11 DIAGNOSIS — I10 BENIGN HYPERTENSION: ICD-10-CM

## 2019-12-11 PROCEDURE — 1036F TOBACCO NON-USER: CPT | Performed by: FAMILY MEDICINE

## 2019-12-11 PROCEDURE — 3079F DIAST BP 80-89 MM HG: CPT | Performed by: FAMILY MEDICINE

## 2019-12-11 PROCEDURE — 3725F SCREEN DEPRESSION PERFORMED: CPT | Performed by: FAMILY MEDICINE

## 2019-12-11 PROCEDURE — 3008F BODY MASS INDEX DOCD: CPT | Performed by: FAMILY MEDICINE

## 2019-12-11 PROCEDURE — 3075F SYST BP GE 130 - 139MM HG: CPT | Performed by: FAMILY MEDICINE

## 2019-12-11 PROCEDURE — 99214 OFFICE O/P EST MOD 30 MIN: CPT | Performed by: FAMILY MEDICINE

## 2019-12-11 RX ORDER — SIMVASTATIN 20 MG
20 TABLET ORAL DAILY
Qty: 90 TABLET | Refills: 2 | Status: SHIPPED | OUTPATIENT
Start: 2019-12-11 | End: 2020-05-20 | Stop reason: SDUPTHER

## 2019-12-11 RX ORDER — SIMVASTATIN 20 MG
40 TABLET ORAL DAILY
Qty: 90 TABLET | Refills: 2 | Status: SHIPPED | OUTPATIENT
Start: 2019-12-11 | End: 2019-12-11

## 2019-12-11 NOTE — TELEPHONE ENCOUNTER
It was sent in error, and was suppose to be simvastatin 20mg daily  Already sent the new script 30 mins ago   Please call and make sure they received it

## 2019-12-11 NOTE — PROGRESS NOTES
[facility]  DM Type 2 check  ASSESSMENT/PLAN  Gayatri Cruz is a 47 y o  male presents to the office for     1) Diabetes Type 2  - Controlled; DIET CONTROLLED  -Most recent lab testing:   Results from last 6 Months   Lab Units 11/20/19  0857   HEMOGLOBIN A1C % 6 2*   CREATININE mg/dL 1 04   - Continue on Diet and Lifestyle changes  - Opthamology:UTD; sent records; mild nail fungus  - Podiatry/ Foot exam: WNL performed today  - Vaccines:  Immunization History   Administered Date(s) Administered    Tdap 06/22/2016     - Diabetes education:Encourage the patient to continue lifestyle changes  2) Gout:  Controlled on allopurinol 300 mg daily    3  Hypertension continue lisinopril 2 5 daily at goal today    4  HLD patient wanted to be taken off this medication  However at this time will reduce to 20 mg given that his fasting lipid panel was within normal limits  I did educate him on continuing this medication given the increased risk of MI with his history of diabetes and hypertension  Patient agrees    5  Chronic uveitis:  Will obtain records; currently asymptomatic    6  Multiple lipomas:  Over the left chest wall and right lower back      BMI Counseling: Body mass index is 30 72 kg/m²  Discussed the patient's BMI with him  The BMI is above normal  Nutrition recommendations include consuming healthier snacks and reducing intake of cholesterol  Exercise recommendations include exercising 3-5 times per week  No future appointments  Disposition: Return to the clinic in 6 months for repeat A1c          SUBJECTIVE  CC: Diabetes      HPI:  Gayatri Cruz is a 47 y o  male presenting to the office for Diabetes check  Patient states that he has been doing very well with his diet and exercise  He has completely eliminated his beer intake to reduce his sugars    Patient continues to not want to be on any medications for his diabetes and continue managing it her diet control  Recently saw his eye doctor for an acute flare-up of acute uveitis  Uveitis was brought on by the stress of his passing of his father from melanoma  Patient did have a complete eye exam which was within normal limits  Patient denies seeing a dermatologist for yearly checkup  He has 2 concerns over left chest and right lower back mass that was diagnosed as a lipoma in the past   Takes both his hyperlipidemia and hypertension medications without any difficulty  Would like to be eliminated from all medications if possible  Has not had any gout flare-up since our last appointment    Review of Systems   Constitutional: Negative for activity change, appetite change, chills, fatigue and fever  HENT: Negative for congestion  Respiratory: Negative for cough, chest tightness and shortness of breath  Cardiovascular: Negative for chest pain and leg swelling  Gastrointestinal: Negative for abdominal distention, abdominal pain, constipation, diarrhea, nausea and vomiting  Musculoskeletal: Positive for back pain (lower back pain)  All other systems reviewed and are negative  Historical Information   The patient history was reviewed as follows:    Past Medical History:   Diagnosis Date    Diabetes mellitus ([facility] [address] )     type 2    Gout     Hypercholesteremia      Past Surgical History:   Procedure Laterality Date    APPENDECTOMY      COLONOSCOPY N/A 8/31/2016    Procedure: COLONOSCOPY;  Surgeon: Huseyin Mason MD;  Location: [facility] GI LAB;   Service:      Family History   Problem Relation Age of Onset    Diabetes Mother    Osker Ok Diabetes Father     Diabetes Sister     Colon cancer Family       Social History   Social History     Substance and Sexual Activity   Alcohol Use Yes    Comment: beer two to three times per week Liliana Alicea      Social History     Substance and Sexual Activity   Drug Use No     Social History     Tobacco Use   Smoking Status Former Smoker   Smokeless Tobacco Never Used Tobacco Comment    quit 10 years ago       Medications:     Current Outpatient Medications:     allopurinol (ZYLOPRIM) 300 mg tablet, Take 1 tablet (300 mg total) by mouth daily, Disp: 90 tablet, Rfl: 0    lisinopril (ZESTRIL) 2 5 mg tablet, Take 1 tablet (2 5 mg total) by mouth daily, Disp: 90 tablet, Rfl: 0    simvastatin (ZOCOR) 40 mg tablet, Take 1 tablet (40 mg total) by mouth daily, Disp: 90 tablet, Rfl: 0  No Known Allergies    OBJECTIVE    Vitals:   Vitals:    12/11/19 0812   BP: 130/80   BP Location: Left arm   Patient Position: Sitting   Cuff Size: Adult   Pulse: 78   Resp: 14   Temp: 97 6 °F (36 4 °C)   TempSrc: Tympanic   Weight: 94 3 kg (208 lb)   Height: 5' 9" (1 753 m)           Physical Exam   Constitutional: He is oriented to person, place, and time  Vital signs are normal  He appears well-developed and well-nourished  HENT:   Head: Normocephalic and atraumatic  Eyes: Pupils are equal, round, and reactive to light  Conjunctivae and EOM are normal    Neck: Normal range of motion  Neck supple  Cardiovascular: Normal rate, regular rhythm, S1 normal, S2 normal, normal heart sounds and intact distal pulses  Pulses are no weak pulses  No murmur heard  Pulses:       Dorsalis pedis pulses are 2+ on the right side, and 2+ on the left side  Posterior tibial pulses are 2+ on the right side, and 2+ on the left side  Pulmonary/Chest: Effort normal and breath sounds normal  No respiratory distress  He has no wheezes  Musculoskeletal: Normal range of motion  He exhibits no edema  Arms:  Feet:   Right Foot:   Skin Integrity: Positive for callus and dry skin  Negative for ulcer, skin breakdown, erythema or warmth  Left Foot:   Skin Integrity: Positive for callus and dry skin  Negative for ulcer, skin breakdown, erythema or warmth  Neurological: He is alert and oriented to person, place, and time  He has normal strength  Skin: Skin is warm     Psychiatric: He has a normal mood and affect  His speech is normal and behavior is normal  Judgment and thought content normal    Vitals reviewed  Patient's shoes and socks removed  Right Foot/Ankle   Right Foot Inspection  Skin Exam: skin normal, skin intact, dry skin, callus and callus no warmth, no erythema, no maceration, no abnormal color, no pre-ulcer and no ulcer                          Toe Exam: ROM and strength within normal limitsno swelling  Sensory   Vibration: intact  Proprioception: intact   Monofilament testing: intact  Vascular  Capillary refills: < 3 seconds  The right DP pulse is 2+  The right PT pulse is 2+  Left Foot/Ankle  Left Foot Inspection  Skin Exam: skin normal, skin intact, dry skin and callusno warmth, no erythema, no maceration, normal color, no pre-ulcer and no ulcer                         Toe Exam: ROM and strength within normal limitsno swelling                   Sensory   Vibration: intact  Proprioception: intact  Monofilament: intact  Vascular  Capillary refills: < 3 seconds  The left DP pulse is 2+  The left PT pulse is 2+  Assign Risk Category:  No deformity present; No loss of protective sensation;  No weak pulses       Risk: 0       Micheal Goltz, MD  [address] [facility]

## 2019-12-11 NOTE — TELEPHONE ENCOUNTER
Dr Marisol Moreno called from [facility] regarding simvastatin 20 mg 2 x daily that was prescribed  They have tab available in 40 mg which would mean patient would only have to take 1 tab  Do you want to do that instead

## 2020-01-07 DIAGNOSIS — M1A.49X0 OTHER SECONDARY CHRONIC GOUT OF MULTIPLE SITES WITHOUT TOPHUS: ICD-10-CM

## 2020-01-07 DIAGNOSIS — I10 BENIGN HYPERTENSION: ICD-10-CM

## 2020-01-07 RX ORDER — LISINOPRIL 2.5 MG/1
2.5 TABLET ORAL DAILY
Qty: 90 TABLET | Refills: 0 | Status: CANCELLED | OUTPATIENT
Start: 2020-01-07

## 2020-01-07 RX ORDER — ALLOPURINOL 300 MG/1
300 TABLET ORAL DAILY
Qty: 90 TABLET | Refills: 0 | Status: CANCELLED | OUTPATIENT
Start: 2020-01-07

## 2020-01-08 DIAGNOSIS — M1A.49X0 OTHER SECONDARY CHRONIC GOUT OF MULTIPLE SITES WITHOUT TOPHUS: ICD-10-CM

## 2020-01-08 DIAGNOSIS — I10 BENIGN HYPERTENSION: ICD-10-CM

## 2020-01-08 RX ORDER — ALLOPURINOL 300 MG/1
300 TABLET ORAL DAILY
Qty: 90 TABLET | Refills: 0 | Status: SHIPPED | OUTPATIENT
Start: 2020-01-08 | End: 2020-05-20 | Stop reason: SDUPTHER

## 2020-01-08 RX ORDER — LISINOPRIL 2.5 MG/1
2.5 TABLET ORAL DAILY
Qty: 90 TABLET | Refills: 0 | Status: SHIPPED | OUTPATIENT
Start: 2020-01-08 | End: 2020-05-20 | Stop reason: SDUPTHER

## 2020-05-19 DIAGNOSIS — I10 BENIGN HYPERTENSION: ICD-10-CM

## 2020-05-19 DIAGNOSIS — M1A.49X0 OTHER SECONDARY CHRONIC GOUT OF MULTIPLE SITES WITHOUT TOPHUS: ICD-10-CM

## 2020-05-19 DIAGNOSIS — E78.2 MIXED HYPERLIPIDEMIA: ICD-10-CM

## 2020-05-19 RX ORDER — ALLOPURINOL 300 MG/1
300 TABLET ORAL DAILY
Qty: 90 TABLET | Refills: 0 | Status: CANCELLED | OUTPATIENT
Start: 2020-05-19

## 2020-05-19 RX ORDER — LISINOPRIL 2.5 MG/1
2.5 TABLET ORAL DAILY
Qty: 90 TABLET | Refills: 0 | Status: CANCELLED | OUTPATIENT
Start: 2020-05-19

## 2020-05-19 RX ORDER — SIMVASTATIN 20 MG
20 TABLET ORAL DAILY
Qty: 90 TABLET | Refills: 0 | Status: CANCELLED | OUTPATIENT
Start: 2020-05-19

## 2020-05-20 ENCOUNTER — TELEPHONE (OUTPATIENT)
Dept: FAMILY MEDICINE CLINIC | Facility: CLINIC | Age: 55
End: 2020-05-20

## 2020-05-20 DIAGNOSIS — I10 BENIGN HYPERTENSION: ICD-10-CM

## 2020-05-20 DIAGNOSIS — E78.2 MIXED HYPERLIPIDEMIA: ICD-10-CM

## 2020-05-20 DIAGNOSIS — M1A.49X0 OTHER SECONDARY CHRONIC GOUT OF MULTIPLE SITES WITHOUT TOPHUS: ICD-10-CM

## 2020-05-20 PROCEDURE — 4010F ACE/ARB THERAPY RXD/TAKEN: CPT | Performed by: FAMILY MEDICINE

## 2020-05-20 RX ORDER — ALLOPURINOL 300 MG/1
300 TABLET ORAL DAILY
Qty: 90 TABLET | Refills: 0 | Status: SHIPPED | OUTPATIENT
Start: 2020-05-20 | End: 2020-05-20 | Stop reason: SDUPTHER

## 2020-05-20 RX ORDER — SIMVASTATIN 20 MG
20 TABLET ORAL DAILY
Qty: 90 TABLET | Refills: 2 | Status: SHIPPED | OUTPATIENT
Start: 2020-05-20 | End: 2020-09-10 | Stop reason: SDUPTHER

## 2020-05-20 RX ORDER — LISINOPRIL 2.5 MG/1
2.5 TABLET ORAL DAILY
Qty: 90 TABLET | Refills: 2 | Status: SHIPPED | OUTPATIENT
Start: 2020-05-20 | End: 2020-09-10 | Stop reason: SDUPTHER

## 2020-05-20 RX ORDER — LISINOPRIL 2.5 MG/1
2.5 TABLET ORAL DAILY
Qty: 90 TABLET | Refills: 0 | Status: SHIPPED | OUTPATIENT
Start: 2020-05-20 | End: 2020-05-20 | Stop reason: SDUPTHER

## 2020-05-20 RX ORDER — SIMVASTATIN 20 MG
20 TABLET ORAL DAILY
Qty: 90 TABLET | Refills: 2 | Status: SHIPPED | OUTPATIENT
Start: 2020-05-20 | End: 2020-05-20 | Stop reason: SDUPTHER

## 2020-05-20 RX ORDER — ALLOPURINOL 300 MG/1
300 TABLET ORAL DAILY
Qty: 90 TABLET | Refills: 2 | Status: SHIPPED | OUTPATIENT
Start: 2020-05-20 | End: 2020-09-10 | Stop reason: SDUPTHER

## 2020-06-22 ENCOUNTER — TELEMEDICINE (OUTPATIENT)
Dept: FAMILY MEDICINE CLINIC | Facility: CLINIC | Age: 55
End: 2020-06-22
Payer: COMMERCIAL

## 2020-06-22 DIAGNOSIS — M54.50 LUMBAR BACK PAIN: Primary | ICD-10-CM

## 2020-06-22 DIAGNOSIS — M54.31 RIGHT SCIATIC NERVE PAIN: ICD-10-CM

## 2020-06-22 PROCEDURE — 99214 OFFICE O/P EST MOD 30 MIN: CPT | Performed by: FAMILY MEDICINE

## 2020-06-22 RX ORDER — PREDNISONE 20 MG/1
TABLET ORAL
Qty: 18 TABLET | Refills: 0 | Status: SHIPPED | OUTPATIENT
Start: 2020-06-22 | End: 2021-02-11

## 2020-09-10 DIAGNOSIS — I10 BENIGN HYPERTENSION: ICD-10-CM

## 2020-09-10 DIAGNOSIS — M1A.49X0 OTHER SECONDARY CHRONIC GOUT OF MULTIPLE SITES WITHOUT TOPHUS: ICD-10-CM

## 2020-09-10 DIAGNOSIS — E78.2 MIXED HYPERLIPIDEMIA: ICD-10-CM

## 2020-09-11 RX ORDER — LISINOPRIL 2.5 MG/1
2.5 TABLET ORAL DAILY
Qty: 90 TABLET | Refills: 0 | Status: SHIPPED | OUTPATIENT
Start: 2020-09-11 | End: 2021-02-11 | Stop reason: SDUPTHER

## 2020-09-11 RX ORDER — SIMVASTATIN 20 MG
20 TABLET ORAL DAILY
Qty: 90 TABLET | Refills: 0 | Status: SHIPPED | OUTPATIENT
Start: 2020-09-11 | End: 2021-02-11 | Stop reason: SDUPTHER

## 2020-09-11 RX ORDER — ALLOPURINOL 300 MG/1
300 TABLET ORAL DAILY
Qty: 90 TABLET | Refills: 0 | Status: SHIPPED | OUTPATIENT
Start: 2020-09-11 | End: 2021-02-11 | Stop reason: SDUPTHER

## 2020-11-12 ENCOUNTER — TELEPHONE (OUTPATIENT)
Dept: FAMILY MEDICINE CLINIC | Facility: CLINIC | Age: 55
End: 2020-11-12

## 2020-12-22 ENCOUNTER — TELEMEDICINE (OUTPATIENT)
Dept: FAMILY MEDICINE CLINIC | Facility: CLINIC | Age: 55
End: 2020-12-22
Payer: COMMERCIAL

## 2020-12-22 DIAGNOSIS — Z20.822 EXPOSURE TO COVID-19 VIRUS: Primary | ICD-10-CM

## 2020-12-22 DIAGNOSIS — Z20.822 EXPOSURE TO COVID-19 VIRUS: ICD-10-CM

## 2020-12-22 PROCEDURE — 99213 OFFICE O/P EST LOW 20 MIN: CPT | Performed by: FAMILY MEDICINE

## 2020-12-22 PROCEDURE — U0003 INFECTIOUS AGENT DETECTION BY NUCLEIC ACID (DNA OR RNA); SEVERE ACUTE RESPIRATORY SYNDROME CORONAVIRUS 2 (SARS-COV-2) (CORONAVIRUS DISEASE [COVID-19]), AMPLIFIED PROBE TECHNIQUE, MAKING USE OF HIGH THROUGHPUT TECHNOLOGIES AS DESCRIBED BY CMS-2020-01-R: HCPCS | Performed by: FAMILY MEDICINE

## 2020-12-24 LAB — SARS-COV-2 RNA SPEC QL NAA+PROBE: NOT DETECTED

## 2021-02-11 ENCOUNTER — OFFICE VISIT (OUTPATIENT)
Dept: FAMILY MEDICINE CLINIC | Facility: CLINIC | Age: 56
End: 2021-02-11
Payer: COMMERCIAL

## 2021-02-11 VITALS
TEMPERATURE: 97.9 F | BODY MASS INDEX: 31.25 KG/M2 | OXYGEN SATURATION: 99 % | HEIGHT: 69 IN | DIASTOLIC BLOOD PRESSURE: 82 MMHG | WEIGHT: 211 LBS | SYSTOLIC BLOOD PRESSURE: 120 MMHG | RESPIRATION RATE: 16 BRPM | HEART RATE: 68 BPM

## 2021-02-11 DIAGNOSIS — E11.9 CONTROLLED TYPE 2 DIABETES MELLITUS WITHOUT COMPLICATION, WITHOUT LONG-TERM CURRENT USE OF INSULIN (HCC): Primary | ICD-10-CM

## 2021-02-11 DIAGNOSIS — M1A.49X0 OTHER SECONDARY CHRONIC GOUT OF MULTIPLE SITES WITHOUT TOPHUS: ICD-10-CM

## 2021-02-11 DIAGNOSIS — Z12.5 SCREENING PSA (PROSTATE SPECIFIC ANTIGEN): ICD-10-CM

## 2021-02-11 DIAGNOSIS — E78.2 MIXED HYPERLIPIDEMIA: ICD-10-CM

## 2021-02-11 DIAGNOSIS — I10 BENIGN HYPERTENSION: ICD-10-CM

## 2021-02-11 LAB — SL AMB POCT HEMOGLOBIN AIC: 6.5 (ref ?–6.5)

## 2021-02-11 PROCEDURE — 83036 HEMOGLOBIN GLYCOSYLATED A1C: CPT | Performed by: FAMILY MEDICINE

## 2021-02-11 PROCEDURE — 1036F TOBACCO NON-USER: CPT | Performed by: FAMILY MEDICINE

## 2021-02-11 PROCEDURE — 3044F HG A1C LEVEL LT 7.0%: CPT | Performed by: FAMILY MEDICINE

## 2021-02-11 PROCEDURE — 3008F BODY MASS INDEX DOCD: CPT | Performed by: FAMILY MEDICINE

## 2021-02-11 PROCEDURE — 4010F ACE/ARB THERAPY RXD/TAKEN: CPT | Performed by: FAMILY MEDICINE

## 2021-02-11 PROCEDURE — 99214 OFFICE O/P EST MOD 30 MIN: CPT | Performed by: FAMILY MEDICINE

## 2021-02-11 PROCEDURE — 3725F SCREEN DEPRESSION PERFORMED: CPT | Performed by: FAMILY MEDICINE

## 2021-02-11 RX ORDER — SIMVASTATIN 20 MG
20 TABLET ORAL DAILY
Qty: 90 TABLET | Refills: 2 | Status: SHIPPED | OUTPATIENT
Start: 2021-02-11 | End: 2021-06-07 | Stop reason: SDUPTHER

## 2021-02-11 RX ORDER — ALLOPURINOL 300 MG/1
300 TABLET ORAL DAILY
Qty: 90 TABLET | Refills: 2 | Status: SHIPPED | OUTPATIENT
Start: 2021-02-11 | End: 2021-06-07 | Stop reason: SDUPTHER

## 2021-02-11 RX ORDER — LISINOPRIL 2.5 MG/1
2.5 TABLET ORAL DAILY
Qty: 90 TABLET | Refills: 2 | Status: SHIPPED | OUTPATIENT
Start: 2021-02-11 | End: 2021-06-07 | Stop reason: SDUPTHER

## 2021-02-11 NOTE — PROGRESS NOTES
Addison Guido 1965 male MRN: [id number]    FAMILY PRACTICE OFFICE VISIT  [facility]      ASSESSMENT/PLAN  Addison Guido is a 54 y o  male presents to the office for    {Assess/Plan SmartLinks:73652}            No future appointments         SUBJECTIVE  CC: Diabetes (pt here for diabetes check)      HPI:  Addison Guido is a 54 y o  male who presents for   Review of Systems    Historical Information   The patient history was reviewed as follows:  Past Medical History:   Diagnosis Date    Diabetes mellitus ([address] )     type 2    Gout     Hypercholesteremia          Medications:     Current Outpatient Medications:     allopurinol (ZYLOPRIM) 300 mg tablet, Take 1 tablet (300 mg total) by mouth daily, Disp: 90 tablet, Rfl: 0    lisinopril (ZESTRIL) 2 5 mg tablet, Take 1 tablet (2 5 mg total) by mouth daily, Disp: 90 tablet, Rfl: 0    simvastatin (ZOCOR) 20 mg tablet, Take 1 tablet (20 mg total) by mouth daily, Disp: 90 tablet, Rfl: 0    No Known Allergies    OBJECTIVE  Vitals:   Vitals:    02/11/21 1528   BP: 120/82   BP Location: Left arm   Patient Position: Sitting   Cuff Size: Standard   Pulse: 68   Resp: 16   Temp: 97 9 °F (36 6 °C)   TempSrc: Temporal   SpO2: 99%   Weight: 95 7 kg (211 lb)   Height: 5' 9" (1 753 m)         Physical Exam               Etelvina Briones MD,   [facility]  2/11/2021

## 2021-02-11 NOTE — PROGRESS NOTES
[facility]  DM Type 2 check  ASSESSMENT/PLAN  Chip Other is a 54 y o  male presents to the office for     Diagnoses and all orders for this visit:    Controlled type 2 diabetes mellitus without complication, without long-term current use of insulin ([facility] [address] )  -     POCT hemoglobin A1c  -     Comprehensive metabolic panel; Future  -     CBC and differential; Future  -     Microalbumin / creatinine urine ratio  -     Comprehensive metabolic panel  -     CBC and differential  -     Microalbumin / creatinine urine ratio    Screening PSA (prostate specific antigen)  -     PSA, Total Screen; Future    Benign hypertension  -     Comprehensive metabolic panel; Future  -     CBC and differential; Future  -     Lipid panel; Future  -     Comprehensive metabolic panel  -     CBC and differential  -     Lipid panel  -     lisinopril (ZESTRIL) 2 5 mg tablet; Take 1 tablet (2 5 mg total) by mouth daily    Mixed hyperlipidemia  -     Lipid panel; Future  -     Lipid panel  -     simvastatin (ZOCOR) 20 mg tablet; Take 1 tablet (20 mg total) by mouth daily    Other secondary chronic gout of multiple sites without tophus  -     Uric acid; Future  -     Uric acid  -     allopurinol (ZYLOPRIM) 300 mg tablet; Take 1 tablet (300 mg total) by mouth daily    Benign hypertension  Comments:  - Well controlled on Lisinopril 2 5mg daily  -CMP/CBC ordered  Orders:  -     Comprehensive metabolic panel; Future  -     CBC and differential; Future  -     Lipid panel; Future  -     Comprehensive metabolic panel  -     CBC and differential  -     Lipid panel  -     lisinopril (ZESTRIL) 2 5 mg tablet; Take 1 tablet (2 5 mg total) by mouth daily    Mixed hyperlipidemia  Comments:  -Continue on Simvastatin 40mg  - Repeat FLP  Orders:  -     Lipid panel; Future  -     Lipid panel  -     simvastatin (ZOCOR) 20 mg tablet;  Take 1 tablet (20 mg total) by mouth daily    Other secondary chronic gout of multiple sites without [facility]  Comments:  - Controlled on Allopurinol 300mg daily  Orders:  -     Uric acid; Future  -     Uric acid  -     allopurinol (ZYLOPRIM) 300 mg tablet; Take 1 tablet (300 mg total) by mouth daily        1) Diabetes Type 2  - Controlled/ Uncontrolled  -Most recent lab testing:   Results from last 6 Months   Lab Units 02/11/21  1536   HEMOGLOBIN A1C  6 5   - Continue on   Diet controlled  Hopefully the patient does not continue to increase his A1c  I know this patient is able to keep his A1c controlled by diet with exercise  - Opthamology:  Scheduled in March  - Podiatry/ Foot exam:  Normal microfilament test today  - Vaccines:  Immunization History   Administered Date(s) Administered    INFLUENZA 11/11/2020    Tdap 06/22/2016   - Diabetes education:Encourage the patient to continue lifestyle changes  2  Hyperlipidemia repeat lipid panel continue on atorvastatin   3  Hypertension well controlled on lisinopril 2 5 mg continue as prescribed  4  Gout currently well controlled on allopurinol  Sent for uric acid       physical exam in 6 months with prostate exam  Future Appointments   Date Time Provider Jazmine Hamlin   8/12/2021  3:00 PM Nava Pelaez MD [facility]              BMI Counseling: Body mass index is 31 16 kg/m²  The BMI is above normal  Nutrition recommendations include decreasing portion sizes, consuming healthier snacks and limiting drinks that contain sugar  Exercise recommendations include exercising 3-5 times per week  SUBJECTIVE  CC: Diabetes (pt here for diabetes check)      HPI:  Destiny Serrano is a 54 y o  male presenting to the office for Diabetes check with Chronic conditions check  Patient has been diet controlled for his diabetes  Has not been exercising as much as he normally did given COVID crisis  As well as the winter  Patient states that he is going to try to use his  Stationary bike more    Does express generalized arthritic pains but does not want to see an orthopedic at this time  Patient states that he has been taking lisinopril prescribed every day  Taking his cholesterol medications every day  Has no gout episode  taking allopurinol without any difficulties  Set to see the eye doctor in March  Review of Systems   Constitutional: Negative for activity change, appetite change, chills, fatigue and fever  HENT: Negative for congestion  Respiratory: Negative for cough, chest tightness and shortness of breath  Cardiovascular: Negative for chest pain and leg swelling  Gastrointestinal: Negative for abdominal distention, abdominal pain, constipation, diarrhea, nausea and vomiting  All other systems reviewed and are negative  Historical Information   The patient history was reviewed as follows:    Past Medical History:   Diagnosis Date    Diabetes mellitus ([address] )     type 2    Gout     Hypercholesteremia      Past Surgical History:   Procedure Laterality Date    APPENDECTOMY      COLONOSCOPY N/A 8/31/2016    Procedure: COLONOSCOPY;  Surgeon: Pita Dupree MD;  Location: [facility] GI [facility];   Service:      Family History   Problem Relation Age of Onset    Diabetes Mother     Diabetes Father     Diabetes Sister     Colon cancer Family       Social History   Social History     Substance and Sexual Activity   Alcohol Use Yes    Comment: beer two to three times per week /occasional      Social History     Substance and Sexual Activity   Drug Use No     Social History     Tobacco Use   Smoking Status Former Smoker   Smokeless Tobacco Never Used   Tobacco Comment    quit 10 years ago       Medications:     Current Outpatient Medications:     allopurinol (ZYLOPRIM) 300 mg tablet, Take 1 tablet (300 mg total) by mouth daily, Disp: 90 tablet, Rfl: 2    lisinopril (ZESTRIL) 2 5 mg tablet, Take 1 tablet (2 5 mg total) by mouth daily, Disp: 90 tablet, Rfl: 2    simvastatin (ZOCOR) 20 mg tablet, Take 1 tablet (20 mg total) by mouth daily, Disp: 90 tablet, Rfl: 2  No Known Allergies    OBJECTIVE    Vitals:   Vitals:    02/11/21 1528   BP: 120/82   BP Location: Left arm   Patient Position: Sitting   Cuff Size: Standard   Pulse: 68   Resp: 16   Temp: 97 9 °F (36 6 °C)   TempSrc: Temporal   SpO2: 99%   Weight: 95 7 kg (211 lb)   Height: 5' 9" (1 753 m)           Physical Exam  Vitals signs reviewed  Constitutional:       Appearance: He is well-developed  HENT:      Head: Normocephalic and atraumatic  Right Ear: Tympanic membrane, ear canal and external ear normal       Left Ear: Tympanic membrane, ear canal and external ear normal    Eyes:      Conjunctiva/sclera: Conjunctivae normal       Pupils: Pupils are equal, round, and reactive to light  Neck:      Musculoskeletal: Normal range of motion and neck supple  Cardiovascular:      Rate and Rhythm: Normal rate and regular rhythm  Pulses: no weak pulses          Dorsalis pedis pulses are 2+ on the right side and 2+ on the left side  Posterior tibial pulses are 2+ on the right side and 2+ on the left side  Heart sounds: Normal heart sounds, S1 normal and S2 normal  No murmur  Pulmonary:      Effort: Pulmonary effort is normal  No respiratory distress  Breath sounds: Normal breath sounds  No wheezing  Musculoskeletal: Normal range of motion  Feet:    Feet:      Right foot:      Skin integrity: No ulcer, skin breakdown, erythema, warmth, callus or dry skin  Left foot:      Skin integrity: No ulcer, skin breakdown, erythema, warmth, callus or dry skin  Skin:     General: Skin is warm  Neurological:      General: No focal deficit present  Mental Status: He is alert and oriented to person, place, and time  Mental status is at baseline  Psychiatric:         Mood and Affect: Mood normal          Speech: Speech normal          Behavior: Behavior normal          Thought Content:  Thought content normal          Judgment: Judgment normal  Patient's shoes and socks removed  Right Foot/Ankle   Right Foot Inspection  Skin Exam: skin normal and skin intact no dry skin, no warmth, no callus, no erythema, no maceration, no abnormal color, no pre-ulcer, no ulcer and no callus                          Toe Exam: ROM and strength within normal limitsno swelling  Sensory   Vibration: intact  Proprioception: intact   Monofilament testing: intact  Vascular  Capillary refills: < 3 seconds  The right DP pulse is 2+  The right PT pulse is 2+  Left Foot/Ankle  Left Foot Inspection  Skin Exam: skin normal and skin intactno dry skin, no warmth, no erythema, no maceration, normal color, no pre-ulcer, no ulcer and no callus                         Toe Exam: ROM and strength within normal limitsno swelling                   Sensory   Vibration: intact  Proprioception: intact  Monofilament: intact  Vascular  Capillary refills: < 3 seconds  The left DP pulse is 2+  The left PT pulse is 2+  Assign Risk Category:  No deformity present; No loss of protective sensation;  No weak pulses       Risk: 0         Matty Gonzalez MD  [address] [facility]

## 2021-02-16 LAB
ALBUMIN SERPL-MCNC: 4.5 G/DL (ref 3.8–4.9)
ALBUMIN/CREAT UR: <7 MG/G CREAT (ref 0–29)
ALBUMIN/GLOB SERPL: 2.1 {RATIO} (ref 1.2–2.2)
ALP SERPL-CCNC: 54 IU/L (ref 39–117)
ALT SERPL-CCNC: 27 IU/L (ref 0–44)
AST SERPL-CCNC: 17 IU/L (ref 0–40)
BASOPHILS # BLD AUTO: 0.1 X10E3/UL (ref 0–0.2)
BASOPHILS NFR BLD AUTO: 1 %
BILIRUB SERPL-MCNC: 1 MG/DL (ref 0–1.2)
BUN SERPL-MCNC: 14 MG/DL (ref 6–24)
BUN/CREAT SERPL: 14 (ref 9–20)
CALCIUM SERPL-MCNC: 10.2 MG/DL (ref 8.7–10.2)
CHLORIDE SERPL-SCNC: 102 MMOL/L (ref 96–106)
CHOLEST SERPL-MCNC: 143 MG/DL (ref 100–199)
CHOLEST/HDLC SERPL: 2.6 RATIO (ref 0–5)
CO2 SERPL-SCNC: 24 MMOL/L (ref 20–29)
CREAT SERPL-MCNC: 0.99 MG/DL (ref 0.76–1.27)
CREAT UR-MCNC: 42.2 MG/DL
EOSINOPHIL # BLD AUTO: 0.3 X10E3/UL (ref 0–0.4)
EOSINOPHIL NFR BLD AUTO: 4 %
ERYTHROCYTE [DISTWIDTH] IN BLOOD BY AUTOMATED COUNT: 13.3 % (ref 11.6–15.4)
GLOBULIN SER-MCNC: 2.1 G/DL (ref 1.5–4.5)
GLUCOSE SERPL-MCNC: 117 MG/DL (ref 65–99)
HCT VFR BLD AUTO: 45.6 % (ref 37.5–51)
HDLC SERPL-MCNC: 54 MG/DL
HGB BLD-MCNC: 15.6 G/DL (ref 13–17.7)
IMM GRANULOCYTES # BLD: 0 X10E3/UL (ref 0–0.1)
IMM GRANULOCYTES NFR BLD: 1 %
LDLC SERPL CALC-MCNC: 66 MG/DL (ref 0–99)
LYMPHOCYTES # BLD AUTO: 1.4 X10E3/UL (ref 0.7–3.1)
LYMPHOCYTES NFR BLD AUTO: 24 %
MCH RBC QN AUTO: 31.1 PG (ref 26.6–33)
MCHC RBC AUTO-ENTMCNC: 34.2 G/DL (ref 31.5–35.7)
MCV RBC AUTO: 91 FL (ref 79–97)
MICROALBUMIN UR-MCNC: <3 UG/ML
MICRODELETION SYND BLD/T FISH: NORMAL
MONOCYTES # BLD AUTO: 0.5 X10E3/UL (ref 0.1–0.9)
MONOCYTES NFR BLD AUTO: 8 %
NEUTROPHILS # BLD AUTO: 3.8 X10E3/UL (ref 1.4–7)
NEUTROPHILS NFR BLD AUTO: 62 %
PLATELET # BLD AUTO: 214 X10E3/UL (ref 150–450)
POTASSIUM SERPL-SCNC: 4.4 MMOL/L (ref 3.5–5.2)
PROT SERPL-MCNC: 6.6 G/DL (ref 6–8.5)
PSA SERPL-MCNC: 1.1 NG/ML (ref 0–4)
RBC # BLD AUTO: 5.02 X10E6/UL (ref 4.14–5.8)
SL AMB EGFR AFRICAN AMERICAN: 99 ML/MIN/1.73
SL AMB EGFR NON AFRICAN AMERICAN: 85 ML/MIN/1.73
SL AMB VLDL CHOLESTEROL CALC: 23 MG/DL (ref 5–40)
SODIUM SERPL-SCNC: 139 MMOL/L (ref 134–144)
TRIGL SERPL-MCNC: 131 MG/DL (ref 0–149)
URATE SERPL-MCNC: 5.1 MG/DL (ref 3.8–8.4)
WBC # BLD AUTO: 6.1 X10E3/UL (ref 3.4–10.8)

## 2021-02-17 ENCOUNTER — TELEPHONE (OUTPATIENT)
Dept: FAMILY MEDICINE CLINIC | Facility: CLINIC | Age: 56
End: 2021-02-17

## 2021-02-17 NOTE — TELEPHONE ENCOUNTER
----- Message from Shyla Finch MD sent at 2/17/2021  7:45 AM EST -----  Please advise patient that all his labs are within normal limits  Including Prostate levels, urine for protein detection, Cholesterol  No signs of anemia  Recommend monitoring sugars like we discussed  A1c 6 5% repeat in 6 months like we discussed in   person

## 2021-05-27 ENCOUNTER — TELEPHONE (OUTPATIENT)
Dept: FAMILY MEDICINE CLINIC | Facility: CLINIC | Age: 56
End: 2021-05-27

## 2021-06-07 DIAGNOSIS — M1A.49X0 OTHER SECONDARY CHRONIC GOUT OF MULTIPLE SITES WITHOUT TOPHUS: ICD-10-CM

## 2021-06-07 DIAGNOSIS — E78.2 MIXED HYPERLIPIDEMIA: ICD-10-CM

## 2021-06-07 DIAGNOSIS — I10 BENIGN HYPERTENSION: ICD-10-CM

## 2021-06-07 PROCEDURE — 4010F ACE/ARB THERAPY RXD/TAKEN: CPT | Performed by: DERMATOLOGY

## 2021-06-07 RX ORDER — LISINOPRIL 2.5 MG/1
2.5 TABLET ORAL DAILY
Qty: 90 TABLET | Refills: 0 | Status: SHIPPED | OUTPATIENT
Start: 2021-06-07 | End: 2021-10-06 | Stop reason: SDUPTHER

## 2021-06-07 RX ORDER — ALLOPURINOL 300 MG/1
300 TABLET ORAL DAILY
Qty: 90 TABLET | Refills: 0 | Status: SHIPPED | OUTPATIENT
Start: 2021-06-07 | End: 2021-10-06 | Stop reason: SDUPTHER

## 2021-06-07 RX ORDER — SIMVASTATIN 20 MG
20 TABLET ORAL DAILY
Qty: 90 TABLET | Refills: 0 | Status: SHIPPED | OUTPATIENT
Start: 2021-06-07 | End: 2021-10-06 | Stop reason: SDUPTHER

## 2021-06-08 ENCOUNTER — OFFICE VISIT (OUTPATIENT)
Dept: DERMATOLOGY | Age: 56
End: 2021-06-08
Payer: COMMERCIAL

## 2021-06-08 VITALS — HEIGHT: 69 IN | BODY MASS INDEX: 30.81 KG/M2 | TEMPERATURE: 98.7 F | WEIGHT: 208 LBS

## 2021-06-08 DIAGNOSIS — Z80.8 FAMILY HISTORY OF MELANOMA: ICD-10-CM

## 2021-06-08 DIAGNOSIS — B35.1 ONYCHOMYCOSIS: ICD-10-CM

## 2021-06-08 DIAGNOSIS — D17.9 LIPOMA, UNSPECIFIED SITE: ICD-10-CM

## 2021-06-08 DIAGNOSIS — L81.4 LENTIGO: Primary | ICD-10-CM

## 2021-06-08 DIAGNOSIS — B35.3 TINEA PEDIS OF BOTH FEET: ICD-10-CM

## 2021-06-08 DIAGNOSIS — D22.9 MULTIPLE BENIGN MELANOCYTIC NEVI: ICD-10-CM

## 2021-06-08 PROCEDURE — 1036F TOBACCO NON-USER: CPT | Performed by: DERMATOLOGY

## 2021-06-08 PROCEDURE — 3008F BODY MASS INDEX DOCD: CPT | Performed by: DERMATOLOGY

## 2021-06-08 PROCEDURE — 99204 OFFICE O/P NEW MOD 45 MIN: CPT | Performed by: DERMATOLOGY

## 2021-06-08 NOTE — PROGRESS NOTES
[facility] Dermatology Clinic Note     Patient Name: Vivian Reyes  Encounter Date: 06/08/2021     Have you been cared for by a [facility] Dermatologist in the last 3 years and, if so, which one? No    · Have you traveled outside of the [address] [place] [address] in the past 3 months or outside of the [facility] area in the last 2 weeks? No     May we call your Preferred Phone number to discuss your specific medical information? Yes     May we leave a detailed message that includes your specific medical information? Yes      Today's Chief Concerns:   Concern #1:  Full Body Check       Past Medical History:  Have you personally ever had or currently have any of the following? · Skin cancer (such as Melanoma, Basal Cell Carcinoma, Squamous Cell Carcinoma? (If Yes, please provide more detail)- No  · Eczema: No  · Psoriasis: No  · HIV/AIDS: No  · Hepatitis B or C: No  · Tuberculosis: No  · Systemic Immunosuppression such as Diabetes, Biologic or Immunotherapy, Chemotherapy, Organ Transplantation, Bone Marrow Transplantation (If YES, please provide more detail): No  · Radiation Treatment (If YES, please provide more detail): No  · Any other major medical conditions/concerns? (If Yes, which types)- No    Social History:     What is/was your primary occupation?       What are your hobbies/past-times? Skiing and golf     Family History:  Have any of your "first degree relatives" (parent, brother, sister, or child) had any of the following       · Skin cancer such as Melanoma or Merkel Cell Carcinoma or Pancreatic Cancer? YES, Father-Melanoma   · Eczema, Asthma, Hay Fever or Seasonal Allergies: No  · Psoriasis or Psoriatic Arthritis: No  · Do any other medical conditions seem to run in your family? If Yes, what condition and which relatives?   No    Current Medications:         Current Outpatient Medications:     allopurinol (ZYLOPRIM) 300 mg tablet, Take 1 tablet (300 mg total) by mouth daily, Disp: 90 tablet, Rfl: 0    lisinopril (ZESTRIL) 2 5 mg tablet, Take 1 tablet (2 5 mg total) by mouth daily, Disp: 90 tablet, Rfl: 0    simvastatin (ZOCOR) 20 mg tablet, Take 1 tablet (20 mg total) by mouth daily, Disp: 90 tablet, Rfl: 0      Review of Systems:  Have you recently had or currently have any of the following? If YES, what are you doing for the problem? · Fever, chills or unintended weight loss: No  · Sudden loss or change in your vision: No  · Nausea, vomiting or blood in your stool: No  · Painful or swollen joints: No  · Wheezing or cough: No  · Changing mole or non-healing wound: No  · Nosebleeds: No  · Excessive sweating: No  · Easy or prolonged bleeding? No  · Over the last 2 weeks, how often have you been bothered by the following problems? · Taking little interest or pleasure in doing things: 1 - Not at All  · Feeling down, depressed, or hopeless: 1 - Not at All  · Rapid heartbeat with epinephrine:  No    · FEMALES ONLY:    · Are you pregnant or planning to become pregnant? No  · Are you currently or planning to be nursing or breast feeding? No    · Any known allergies? · No Known Allergies      Physical Exam:     Was a chaperone (Derm Clinical Assistant) present throughout the entire Physical Exam? Yes     Did the Dermatology Team specifically  the patient on the importance of a Full Skin Exam to be sure that nothing is missed clinically?  Yes}  o Did the patient ultimately request or accept a Full Skin Exam?  NO  o Did the patient specifically refuse to have the areas "under-the-bra" examined by the Dermatologist? No  o Did the patient specifically refuse to have the areas "under-the-underwear" examined by the Dermatologist? No    CONSTITUTIONAL:   Vitals:    06/08/21 0930   Temp: 98 7 °F (37 1 °C)   TempSrc: Tympanic   Weight: 94 3 kg (208 lb)   Height: 5' 9" (1 753 m)         PSYCH: Normal mood and affect  EYES: Normal conjunctiva  ENT: Normal lips and oral mucosa  CARDIOVASCULAR: No edema  RESPIRATORY: Normal respirations  HEME/LYMPH/IMMUNO:  No regional lymphadenopathy except as noted below in "ASSESSMENT AND PLAN BY DIAGNOSIS"    SKIN:  FULL ORGAN SYSTEM EXAM   Hair, Scalp, Ears, Face Normal except as noted below in Assessment   Neck, Cervical Chain Nodes Normal except as noted below in Assessment   Right Arm/Hand/Fingers Normal except as noted below in Assessment   Left Arm/Hand/Fingers Normal except as noted below in Assessment   Chest/Breasts/Axillae Viewed areas Normal except as noted below in Assessment   Abdomen, Umbilicus Normal except as noted below in Assessment   Back/Spine Normal except as noted below in Assessment   Groin/Genitalia/Buttocks Normal except as noted below in Assessment   Right Leg, Foot, Toes Normal except as noted below in Assessment   Left Leg, Foot, Toes Normal except as noted below in Assessment        Assessment and Plan by Diagnosis:      1  SOLAR LENTIGINES      Physical Exam:   Anatomic Location Affected:  Sun exposed areas of back, chest, arms, legs   Morphological Description:  Multiple scattered brown to tan evenly pigmented macules    Denies pain, itch, bleeding  No treatments tried  Present for months - years  Reports getting newer lesions with sun exposure  Assessment and Plan:  Based on a thorough discussion of this condition and the management approach to it (including a comprehensive discussion of the known risks, side effects and potential benefits of treatment), the patient (family) agrees to implement the following specific plan:  · Reassure benign  · Use sun protection  Apply SPF 30 or higher at least three times a day  Wear sun protecting clothing and hats           2  MULTIPLE MELANOCYTIC NEVI ("Moles")     Physical Exam:  · Anatomic Location Affected: Trunk and extremities  · Morphological Description:  Scattered, round to ovoid, symmetrical-appearing, even bordered, skin colored to dark [place] macules/papules  · Denies pain, itch, bleeding  No treatments tried  Present for years  Present constantly; no modifying factors which make it worse or better  Denies actively changing or growing moles  Assessment and Plan:  Based on a thorough discussion of this condition and the management approach to it (including a comprehensive discussion of the known risks, side effects and potential benefits of treatment), the patient (family) agrees to implement the following specific plan:  · Reassure benign  · Monitor for changes  · Use sun protection  Apply SPF 30 or higher at least three times a day  Wear sun protecting clothing and hats  · Yearly skin exams are recommended because of the family history of melanoma       Worrisome signs of skin malignancy discussed, questions answered  Regular self-skin check discussed  Advised to call or return to office if patient notices any spots of concern, rapidly growing/changing lesions, bleeding lesions, non-healing lesions  Advised regular SPF use  3  ONYCHOMYCOSIS ("FUNGAL NAIL")    Physical Exam:   Anatomic Location Affected:  Nails    Morphological Description:  Hyperkeratotic nail bleats    Pertinent Positives: N/A   Pertinent Negatives: N/A    Additional History of Present Condition:  N/A    Assessment and Plan:  Based on a thorough discussion of this condition and the management approach to it (including a comprehensive discussion of the known risks, side effects and potential benefits of treatment), the patient (family) agrees to implement the following specific plan:   Continue home treatment     What are fungal nail infections? Fungal infection of the nails is also known as onychomycosis  It is increasingly common with increased age  It rarely affects children  Which organisms cause onychomycosis?   Onychomycosis can be due to:  Dermatophytes such as Trichophyton rubrum (T  rubrum), T  interdigitale (tinea unguium)   Yeasts such as Candida albicans   Molds such as Scopulariopsis brevicaulis and Fusarium species  What are the clinical features of onychomycosis? Onychomycosis may affect one or more toenails and fingernails and most often involves the great toenail or the little toenail  It can present in one or several different patterns   Lateral onychomycosis: a white or yellow opaque streak appears at one side of the nail   Subungual hyperkeratosis: scaling occurs under the nail   Distal onycholysis: the end of the nail lifts  The free edge often crumbles   Superficial white onychomycosis: flaky white patches and pits appear on the top of the nail plate   Proximal onychomycosis:yellow spots appear in the half-moon (lunula)   Onychoma or dermatophytoma:a thick localized area of infection in the nail plate    Destruction of the nail    Tinea unguium often results from untreated tinea pedis (feet) or tinea manuum (hand)  It may follow an injury to the nail or inflammatory disease of the nail  Candida infection of the nail plate generally results from paronychia and starts near the nail fold (the cuticle)  The nail fold is swollen and red, lifted off the nail plate  White, yellow, green or black marks appear on the nearby nail and spread  The nail may lift off its bed and is tender if you press on it  Mold infections are similar in appearance to tinea unguium  Onychomycosis must be distinguished from other nail disorders   Bacterial infection especially Pseudomonas aeruginosa, which turns the nail black or green    Psoriasis    Eczema or dermatitis    Lichen planus    Viral warts    Onycholysis    Onychogryphosis (nail thickening and scaling under the nail), common in the elderly    How is the diagnosis of onychomycosis confirmed? Clippings should be taken from crumbling tissue at the end of the infected nail  The discolored surface of the nails can be scraped off  The debris can be scooped out from under the nail   The clippings and scrapings are sent to a mycology laboratory for microscopy and culture  Previous treatment can reduce the chance of growing the fungus successfully in a culture, so it is best to take the clippings before any treatment is commenced:   To confirm the diagnosis -- antifungal treatment will not be successful if there is another explanation for the nail condition    To identify the responsible organism  Molds and yeasts may require different treatment from dermatophyte fungi   Treatment may be required for a prolonged period and is expensive  Partially treated infection may be impossible to prove for many months as antifungal drugs can be detected even a year later  A nail biopsy may also reveal characteristic histopathological features of onychomycosis  What is the treatment of onychomycosis? Fingernail infections are usually cured more quickly and effectively than toenail infections  Mild infections affecting less than 50% of one or two nails may respond to topical antifungal medications, but cure usually requires an oral antifungal medication for several months  Devices used to treat onychomycosis  Recently, non-drug treatment has been developed to treat onychomycosis thus avoiding the side effects and risks of oral antifungal drugs  Lasers emitting infrared radiation are thought to kill fungi by the production of heat within the infected tissue  Laser treatment is reported to safely eradicate nail fungi with one to three, almost painless, sessions  Several lasers have been approved for this purpose by the FDA and other regulatory authorities  However, high-quality studies of efficacy are lacking, and existing studies indicate that laser treatment is less medically effective than topical or oral antifungal agents  Nd:YAG continuous, long or short-pulsed lasers   Ti:Sapphire modelocked laser   Diode laser  4   TINEA PEDIS ("ATHLETE'S FOOT")    Physical Exam:   Anatomic Location Affected: Bilateral feet    Morphological Description:  moccasin distribution of erythema and scale   Pertinent Positives: N/A   Pertinent Negatives: N/A    Additional History of Present Condition:  N/A    Assessment and Plan:  Based on a thorough discussion of this condition and the management approach to it (including a comprehensive discussion of the known risks, side effects and potential benefits of treatment), the patient (family) agrees to implement the following specific plan:   Continue home treatments     Tinea Pedis  Tinea pedis is a fungal infection of the foot and is in fact the most common fungal infection  Tinea pedis is caused by dermatophyte fungi with the three most common being Trichophyton (T ) rubrum, T  interdigitale and Epidermophyton floccosum  Tinea pedis most commonly involves the interdigital spaces, known as "athlete's foot " Other typical sites include the toenails, groin, and palms of the hands  There are four major manifestations of tinea pedis including chronic hyperkeratotic, chronic intertriginous, acute ulcerative and vesicobullous  Signs and symptoms include:    Itchiness, redness, and scaling between the toes   Scales covering the soles and sides of the feet   Blisters over the inner aspect of the feet    It is particularly common in hot, tropical, and urban environments where sweating in the feet facilitate fungal growth  Risk factors for development include:   Occlusive footwear   Excessive swearing   Diabetes or other underlying immunosuppression    Poor peripheral circulation     The diagnosis of tinea pedis can usually be made via good history and physical exam due to its characteristic clinical features  Diagnosis can be confirmed by examining skin scrapings under the microscope  Cultures are occasionally done but may not be necessary if fungi are seen under microscopy       Other diagnoses to consider if patients do not respond to therapy include psoriasis, contact dermatitis, and eczema  Tinea pedis can be treated with topical antifungal drugs applied to affected areas on a repeated basis (usually 2 twice a day) for 2 to 4 weeks  Common topical medications include topical ketoconazole, allylamines, butenafine, ciclopirox, and tolnaftate  In cases that do not respond to topical therapy, oral antifungal agents may be used which include terbinafine, itraconazole, fluconazole and griseofulvin  These oral agents are also used to treat tinea capitis (fungal infection of the scalp) and onychomycosis (fungal infection of the nails)  Those with pre-existing liver problems are usually screened for liver function prior to starting oral terbinafine  Tinea pedis can be prevented by making sure feet are clean and dry with protective footwear worn in communal facilities  Other recommendations are:   Using drying foot powders when wearing occlusive shoes    Thoroughly dry shoes and boots prior to wearing them    Making sure to clean contaminated bathroom floors with bleach    Treatment of family members and other close contacts    5  LIPOMA    Physical Exam:   Anatomic Location Affected:  L chest   Morphological Description:  6cm well defined, mobile subcutaneous nodule   Pertinent Positives:   Pertinent Negatives: Additional History of Present Condition:  N/A    Assessment and Plan:  Based on a thorough discussion of this condition and the management approach to it (including a comprehensive discussion of the known risks, side effects and potential benefits of treatment), the patient (family) agrees to implement the following specific plan:   This could be removed surgically or treated with and injection    Lipoma  A lipoma is a non-cancerous tumor that is made up of fat cells  It slowly grows under the skin in the subcutaneous tissue  A person may have a single lipoma or may have many lipomas  They are very common    Lipomas can occur in people of all ages, however, they tend to develop in adulthood and are most noticeable during middle age  They affect both sexes equally, although solitary lipomas are more common in women whilst multiple lipomas occur more frequently in men  The cause of lipomas is unknown  It is possible there may be genetic involvement as many patients with lipomas come from a family with a history of these tumors  Sometimes an injury such as a blunt blow to part of the body may trigger growth of a lipoma  People are often unaware of lipomas until they have grown large enough to become visible and palpable  This growth occurs slowly over several years  Some features of lipomas include:   A dome-shaped or egg-shaped lump about 2-10 cm in diameter (some may grow even larger)    It feels soft and smooth and is easily moved under the skin with the fingers    Some have a rubbery or doughy consistency    They are most common on the shoulders, neck, trunk and arms, but they can occur anywhere on the body where fat tissue is present  Most lipomas are symptomless, but some are painful on applying pressure  Lipomas that are tender or painful are usually angiolipomas  This means the lipoma has an increased number of small blood vessels  Painful lipomas are also a feature of adiposis dolorosa or Dercum disease  Diagnosis of lipoma is usually made clinically by finding a soft lump under the skin  However, if there is any doubt, a deep skin biopsy can be performed which will show typical histopathological features of lipoma and its variants  Most lipomas require no treatment  Most lipomas eventually stop growing and remain indefinitely without causing any problems   Occasionally, lipomas that interfere with the movement of adjacent muscles may require surgical removal  Several methods are available:   Simple surgical excision    Squeeze technique (a small incision is made over the lipoma and the fatty tissue is squeezed through the hole)  Liposuction   mesotherapy - injection of a chemical which dissolves the fat            Scribe Attestation    I,:  Brad Fix am acting as a scribe while in the presence of the attending physician :       I,:  Mala Ramsey MD personally performed the services described in this documentation    as scribed in my presence :

## 2021-06-08 NOTE — PATIENT INSTRUCTIONS
1  SOLAR LENTIGINES            Assessment and Plan:  Based on a thorough discussion of this condition and the management approach to it (including a comprehensive discussion of the known risks, side effects and potential benefits of treatment), the patient (family) agrees to implement the following specific plan:  · Reassure benign  · Use sun protection  Apply SPF 30 or higher at least three times a day  Wear sun protecting clothing and hats  2  MULTIPLE MELANOCYTIC NEVI ("Moles")       Assessment and Plan:  Based on a thorough discussion of this condition and the management approach to it (including a comprehensive discussion of the known risks, side effects and potential benefits of treatment), the patient (family) agrees to implement the following specific plan:  · Reassure benign  · Monitor for changes  · Use sun protection  Apply SPF 30 or higher at least three times a day  Wear sun protecting clothing and hats  Worrisome signs of skin malignancy discussed, questions answered  Regular self-skin check discussed  Advised to call or return to office if patient notices any spots of concern, rapidly growing/changing lesions, bleeding lesions, non-healing lesions  Advised regular SPF use  3  ONYCHOMYCOSIS ("FUNGAL NAIL")      Assessment and Plan:  Based on a thorough discussion of this condition and the management approach to it (including a comprehensive discussion of the known risks, side effects and potential benefits of treatment), the patient (family) agrees to implement the following specific plan:   Continue home treatment     What are fungal nail infections? Fungal infection of the nails is also known as onychomycosis  It is increasingly common with increased age  It rarely affects children  Which organisms cause onychomycosis?   Onychomycosis can be due to:  Dermatophytes such as Trichophyton rubrum (T  rubrum), T  interdigitale (tinea unguium)   Yeasts such as Candida albicans Molds such as Scopulariopsis brevicaulis and Fusarium species  What are the clinical features of onychomycosis? Onychomycosis may affect one or more toenails and fingernails and most often involves the great toenail or the little toenail  It can present in one or several different patterns   Lateral onychomycosis: a white or yellow opaque streak appears at one side of the nail   Subungual hyperkeratosis: scaling occurs under the nail   Distal onycholysis: the end of the nail lifts  The free edge often crumbles   Superficial white onychomycosis: flaky white patches and pits appear on the top of the nail plate   Proximal onychomycosis:yellow spots appear in the half-moon (lunula)   Onychoma or dermatophytoma:a thick localized area of infection in the nail plate    Destruction of the nail    Tinea unguium often results from untreated tinea pedis (feet) or tinea manuum (hand)  It may follow an injury to the nail or inflammatory disease of the nail  Candida infection of the nail plate generally results from paronychia and starts near the nail fold (the cuticle)  The nail fold is swollen and red, lifted off the nail plate  White, yellow, green or black marks appear on the nearby nail and spread  The nail may lift off its bed and is tender if you press on it  Mold infections are similar in appearance to tinea unguium  Onychomycosis must be distinguished from other nail disorders   Bacterial infection especially Pseudomonas aeruginosa, which turns the nail black or green    Psoriasis    Eczema or dermatitis    Lichen planus    Viral warts    Onycholysis    Onychogryphosis (nail thickening and scaling under the nail), common in the elderly    How is the diagnosis of onychomycosis confirmed? Clippings should be taken from crumbling tissue at the end of the infected nail  The discolored surface of the nails can be scraped off  The debris can be scooped out from under the nail   The clippings and scrapings are sent to a mycology laboratory for microscopy and culture  Previous treatment can reduce the chance of growing the fungus successfully in a culture, so it is best to take the clippings before any treatment is commenced:   To confirm the diagnosis -- antifungal treatment will not be successful if there is another explanation for the nail condition    To identify the responsible organism  Molds and yeasts may require different treatment from dermatophyte fungi   Treatment may be required for a prolonged period and is expensive  Partially treated infection may be impossible to prove for many months as antifungal drugs can be detected even a year later  A nail biopsy may also reveal characteristic histopathological features of onychomycosis  What is the treatment of onychomycosis? Fingernail infections are usually cured more quickly and effectively than toenail infections  Mild infections affecting less than 50% of one or two nails may respond to topical antifungal medications, but cure usually requires an oral antifungal medication for several months  Devices used to treat onychomycosis  Recently, non-drug treatment has been developed to treat onychomycosis thus avoiding the side effects and risks of oral antifungal drugs  Lasers emitting infrared radiation are thought to kill fungi by the production of heat within the infected tissue  Laser treatment is reported to safely eradicate nail fungi with one to three, almost painless, sessions  Several lasers have been approved for this purpose by the FDA and other regulatory authorities  However, high-quality studies of efficacy are lacking, and existing studies indicate that laser treatment is less medically effective than topical or oral antifungal agents  Nd:YAG continuous, long or short-pulsed lasers   Ti:Sapphire modelocked laser   Diode laser  4   TINEA PEDIS ("ATHLETE'S FOOT")      Assessment and Plan:  Based on a thorough discussion of this condition and the management approach to it (including a comprehensive discussion of the known risks, side effects and potential benefits of treatment), the patient (family) agrees to implement the following specific plan:   Continue home treatments     Tinea Pedis  Tinea pedis is a fungal infection of the foot and is in fact the most common fungal infection  Tinea pedis is caused by dermatophyte fungi with the three most common being Trichophyton (T ) rubrum, T  interdigitale and Epidermophyton floccosum  Tinea pedis most commonly involves the interdigital spaces, known as "athlete's foot " Other typical sites include the toenails, groin, and palms of the hands  There are four major manifestations of tinea pedis including chronic hyperkeratotic, chronic intertriginous, acute ulcerative and vesicobullous  Signs and symptoms include:    Itchiness, redness, and scaling between the toes   Scales covering the soles and sides of the feet   Blisters over the inner aspect of the feet    It is particularly common in hot, tropical, and urban environments where sweating in the feet facilitate fungal growth  Risk factors for development include:   Occlusive footwear   Excessive swearing   Diabetes or other underlying immunosuppression    Poor peripheral circulation     The diagnosis of tinea pedis can usually be made via good history and physical exam due to its characteristic clinical features  Diagnosis can be confirmed by examining skin scrapings under the microscope  Cultures are occasionally done but may not be necessary if fungi are seen under microscopy  Other diagnoses to consider if patients do not respond to therapy include psoriasis, contact dermatitis, and eczema  Tinea pedis can be treated with topical antifungal drugs applied to affected areas on a repeated basis (usually 2 twice a day) for 2 to 4 weeks   Common topical medications include topical ketoconazole, allylamines, butenafine, ciclopirox, and tolnaftate  In cases that do not respond to topical therapy, oral antifungal agents may be used which include terbinafine, itraconazole, fluconazole and griseofulvin  These oral agents are also used to treat tinea capitis (fungal infection of the scalp) and onychomycosis (fungal infection of the nails)  Those with pre-existing liver problems are usually screened for liver function prior to starting oral terbinafine  Tinea pedis can be prevented by making sure feet are clean and dry with protective footwear worn in communal facilities  Other recommendations are:   Using drying foot powders when wearing occlusive shoes    Thoroughly dry shoes and boots prior to wearing them    Making sure to clean contaminated bathroom floors with bleach    Treatment of family members and other close contacts    5  LIPOMA      Assessment and Plan:  Based on a thorough discussion of this condition and the management approach to it (including a comprehensive discussion of the known risks, side effects and potential benefits of treatment), the patient (family) agrees to implement the following specific plan:   N/A    Lipoma  A lipoma is a non-cancerous tumor that is made up of fat cells  It slowly grows under the skin in the subcutaneous tissue  A person may have a single lipoma or may have many lipomas  They are very common  Lipomas can occur in people of all ages, however, they tend to develop in adulthood and are most noticeable during middle age  They affect both sexes equally, although solitary lipomas are more common in women whilst multiple lipomas occur more frequently in men  The cause of lipomas is unknown  It is possible there may be genetic involvement as many patients with lipomas come from a family with a history of these tumors  Sometimes an injury such as a blunt blow to part of the body may trigger growth of a lipoma    People are often unaware of lipomas until they have grown large enough to become visible and palpable  This growth occurs slowly over several years  Some features of lipomas include:   A dome-shaped or egg-shaped lump about 2-10 cm in diameter (some may grow even larger)    It feels soft and smooth and is easily moved under the skin with the fingers    Some have a rubbery or doughy consistency    They are most common on the shoulders, neck, trunk and arms, but they can occur anywhere on the body where fat tissue is present  Most lipomas are symptomless, but some are painful on applying pressure  Lipomas that are tender or painful are usually angiolipomas  This means the lipoma has an increased number of small blood vessels  Painful lipomas are also a feature of adiposis dolorosa or Dercum disease  Diagnosis of lipoma is usually made clinically by finding a soft lump under the skin  However, if there is any doubt, a deep skin biopsy can be performed which will show typical histopathological features of lipoma and its variants  Most lipomas require no treatment  Most lipomas eventually stop growing and remain indefinitely without causing any problems   Occasionally, lipomas that interfere with the movement of adjacent muscles may require surgical removal  Several methods are available:   Simple surgical excision    Squeeze technique (a small incision is made over the lipoma and the fatty tissue is squeezed through the hole)    Liposuction

## 2021-08-05 ENCOUNTER — RA CDI HCC (OUTPATIENT)
Dept: OTHER | Facility: HOSPITAL | Age: 56
End: 2021-08-05

## 2021-08-12 ENCOUNTER — OFFICE VISIT (OUTPATIENT)
Dept: FAMILY MEDICINE CLINIC | Facility: CLINIC | Age: 56
End: 2021-08-12
Payer: COMMERCIAL

## 2021-08-12 VITALS
HEIGHT: 69 IN | BODY MASS INDEX: 30.9 KG/M2 | SYSTOLIC BLOOD PRESSURE: 130 MMHG | RESPIRATION RATE: 16 BRPM | TEMPERATURE: 98 F | OXYGEN SATURATION: 97 % | HEART RATE: 89 BPM | WEIGHT: 208.6 LBS | DIASTOLIC BLOOD PRESSURE: 80 MMHG

## 2021-08-12 DIAGNOSIS — I10 BENIGN HYPERTENSION: ICD-10-CM

## 2021-08-12 DIAGNOSIS — R73.03 PREDIABETES: ICD-10-CM

## 2021-08-12 DIAGNOSIS — Z00.00 ANNUAL PHYSICAL EXAM: Primary | ICD-10-CM

## 2021-08-12 DIAGNOSIS — E78.2 MIXED HYPERLIPIDEMIA: ICD-10-CM

## 2021-08-12 LAB — SL AMB POCT HEMOGLOBIN AIC: 6.1 (ref ?–6.5)

## 2021-08-12 PROCEDURE — 3079F DIAST BP 80-89 MM HG: CPT | Performed by: FAMILY MEDICINE

## 2021-08-12 PROCEDURE — 3725F SCREEN DEPRESSION PERFORMED: CPT | Performed by: FAMILY MEDICINE

## 2021-08-12 PROCEDURE — 83036 HEMOGLOBIN GLYCOSYLATED A1C: CPT | Performed by: FAMILY MEDICINE

## 2021-08-12 PROCEDURE — 3044F HG A1C LEVEL LT 7.0%: CPT | Performed by: FAMILY MEDICINE

## 2021-08-12 PROCEDURE — 3075F SYST BP GE 130 - 139MM HG: CPT | Performed by: FAMILY MEDICINE

## 2021-08-12 PROCEDURE — 99396 PREV VISIT EST AGE 40-64: CPT | Performed by: FAMILY MEDICINE

## 2021-08-12 PROCEDURE — 1036F TOBACCO NON-USER: CPT | Performed by: FAMILY MEDICINE

## 2021-08-12 PROCEDURE — 3008F BODY MASS INDEX DOCD: CPT | Performed by: FAMILY MEDICINE

## 2021-08-12 RX ORDER — LANOLIN ALCOHOL/MO/W.PET/CERES
CREAM (GRAM) TOPICAL
COMMUNITY
Start: 2020-01-01 | End: 2022-07-26

## 2021-08-12 NOTE — PATIENT INSTRUCTIONS

## 2021-08-12 NOTE — PROGRESS NOTES
[facility]    NAME: Jose Smith  AGE: 54 y o  SEX: male  : 1965     DATE: 2021     Assessment and Plan:     Problem List Items Addressed This Visit        Cardiovascular and Mediastinum    Benign hypertension       Other    Hyperlipidemia    Relevant Orders    Lipid panel    Comprehensive metabolic panel      Other Visit Diagnoses     Annual physical exam    -  Primary    Relevant Orders    Lipid panel    Comprehensive metabolic panel    Prediabetes        Relevant Orders    POCT hemoglobin A1c (Completed)         today we change his status from diabetic to prediabetic  Patient's diabetes is currently in remission with diet controlled  Patient is advised to continue a strict diabetic diet just in case  Patient just recently had his eye exam performed  Blood pressure is well controlled continue on 2 5 mg of lisinopril  Will repeat hyperlipidemia it is due in February however given the patient's great change in diabetes I would like to be sure that his triglycerides have come down as well  Immunizations and preventive care screenings were discussed with patient today  Appropriate education was printed on patient's after visit summary  BMI Counseling: Body mass index is 30 8 kg/m²  The BMI is above normal  Nutrition recommendations include decreasing portion sizes and decreasing fast food intake  Exercise recommendations include exercising 3-5 times per week  No follow-ups on file  Chief Complaint:     Chief Complaint   Patient presents with    Physical Exam     patient here for annual exam and diabetic check    Diabetes      History of Present Illness:     Adult Annual Physical   Patient here for a comprehensive physical exam      He has reduced beer-> Once and a while   patient states that he has  [address] [facility] working very hard on maintaining his diabetes; and not starting on any medications  Patient has been taking his lisinopril without any difficulties  Patient has been taking his cholesterol medication without any difficulties  Patient did recently lose his father last year to melanoma has already seen the dermatologist who advised him that he had no signs of melanoma  He will be going to see  Every 1-2 years  Diet and Physical Activity  · Diet/Nutrition: well balanced diet  · Exercise: no formal exercise and just very active  Depression Screening  PHQ-9 Depression Screening    PHQ-9:   Frequency of the following problems over the past two weeks:      Little interest or pleasure in doing things: 0 - not at all  Feeling down, depressed, or hopeless: 0 - not at all  PHQ-2 Score: 0       General Health  · Sleep: sleeps well and sleeps poorly  Knee and hip pain->  · Hearing: normal - bilateral   · Vision: no vision problems  · Dental: regular dental visits and brushes teeth twice daily   Health  · Symptoms include: none; not to pee  Review of Systems:     Review of Systems   Constitutional: Negative for activity change, appetite change, chills, fatigue and fever  HENT: Negative for congestion  Respiratory: Negative for cough, chest tightness and shortness of breath  Cardiovascular: Negative for chest pain and leg swelling  Gastrointestinal: Negative for abdominal distention, abdominal pain, constipation, diarrhea, nausea and vomiting  All other systems reviewed and are negative  Past Medical History:     Past Medical History:   Diagnosis Date    Diabetes mellitus ([facility] [address] )     type 2    Gout     Hypercholesteremia       Past Surgical History:     Past Surgical History:   Procedure Laterality Date    APPENDECTOMY      COLONOSCOPY N/A 8/31/2016    Procedure: COLONOSCOPY;  Surgeon: Hema Garcia MD;  Location: [facility];   Service:       Family History:     Family History   Problem Relation Age of Onset    Diabetes Mother     Diabetes Father     Diabetes Sister     Colon cancer Family       Social History:     Social History     Socioeconomic History    Marital status: /Civil Union     Spouse name: None    Number of children: None    Years of education: None    Highest education level: None   Occupational History    None   Tobacco Use    Smoking status: Former Smoker    Smokeless tobacco: Never Used    Tobacco comment: quit 10 years ago   Vaping Use    Vaping Use: Never used   Substance and Sexual Activity    Alcohol use: Yes     Comment: beer two to three times per week /occasional     Drug use: No    Sexual activity: None   Other Topics Concern    None   Social History Narrative    Caffeine use      Social Determinants of Health     Financial Resource Strain:     Difficulty of Paying Living Expenses:    Food Insecurity:     Worried About Running Out of Food in the Last Year:     Ran Out of Food in the Last Year:    Transportation Needs:     Lack of Transportation (Medical):      Lack of Transportation (Non-Medical):    Physical Activity:     Days of Exercise per Week:     Minutes of Exercise per Session:    Stress:     Feeling of Stress :    Social Connections:     Frequency of Communication with Friends and Family:     Frequency of Social Gatherings with Friends and Family:     Attends Hinduism Services:     Active Member of Clubs or Organizations:     Attends Club or Organization Meetings:     Marital Status:    Intimate Partner Violence:     Fear of Current or Ex-Partner:     Emotionally Abused:     Physically Abused:     Sexually Abused:       Current Medications:     Current Outpatient Medications   Medication Sig Dispense Refill    allopurinol (ZYLOPRIM) 300 mg tablet Take 1 tablet (300 mg total) by mouth daily 90 tablet 0    Garlic 054 MG CAPS       lisinopril (ZESTRIL) 2 5 mg tablet Take 1 tablet (2 5 mg total) by mouth daily 90 tablet 0    simvastatin (ZOCOR) 20 mg tablet Take 1 tablet (20 mg total) by mouth daily 90 tablet 0     No current facility-administered medications for this visit  Allergies:     No Known Allergies   Physical Exam:     /80 (BP Location: Left arm, Patient Position: Sitting, Cuff Size: Large)   Pulse 89   Temp 98 °F (36 7 °C) (Temporal)   Resp 16   Ht 5' 9" (1 753 m)   Wt 94 6 kg (208 lb 9 6 oz)   SpO2 97%   BMI 30 80 kg/m²     Physical Exam  Vitals reviewed  Constitutional:       Appearance: He is well-developed  HENT:      Head: Normocephalic and atraumatic  Right Ear: External ear normal       Left Ear: External ear normal       Nose: Nose normal    Eyes:      Conjunctiva/sclera: Conjunctivae normal       Pupils: Pupils are equal, round, and reactive to light  Cardiovascular:      Rate and Rhythm: Normal rate and regular rhythm  Heart sounds: Normal heart sounds  Pulmonary:      Effort: Pulmonary effort is normal       Breath sounds: Normal breath sounds  No wheezing  Abdominal:      General: Bowel sounds are normal  There is no distension  Palpations: Abdomen is soft  There is no mass  Tenderness: There is no abdominal tenderness  Genitourinary:     Prostate: Normal       Rectum: External hemorrhoid present  Musculoskeletal:         General: No tenderness  Normal range of motion  Cervical back: Normal range of motion and neck supple  Skin:     General: Skin is warm  Capillary Refill: Capillary refill takes less than 2 seconds  Neurological:      Mental Status: He is alert and oriented to person, place, and time  Cranial Nerves: No cranial nerve deficit  Sensory: No sensory deficit  Motor: No abnormal muscle tone  Coordination: Coordination normal       Deep Tendon Reflexes: Reflexes normal    Psychiatric:         Behavior: Behavior normal          Thought Content:  Thought content normal          Judgment: Judgment normal           Michele Glasgow MD  [address]

## 2021-10-06 DIAGNOSIS — I10 BENIGN HYPERTENSION: ICD-10-CM

## 2021-10-06 DIAGNOSIS — M1A.49X0 OTHER SECONDARY CHRONIC GOUT OF MULTIPLE SITES WITHOUT TOPHUS: ICD-10-CM

## 2021-10-06 DIAGNOSIS — E78.2 MIXED HYPERLIPIDEMIA: ICD-10-CM

## 2021-10-06 RX ORDER — LISINOPRIL 2.5 MG/1
2.5 TABLET ORAL DAILY
Qty: 90 TABLET | Refills: 0 | Status: SHIPPED | OUTPATIENT
Start: 2021-10-06 | End: 2022-02-08 | Stop reason: SDUPTHER

## 2021-10-06 RX ORDER — SIMVASTATIN 20 MG
20 TABLET ORAL DAILY
Qty: 90 TABLET | Refills: 0 | Status: SHIPPED | OUTPATIENT
Start: 2021-10-06 | End: 2022-02-08 | Stop reason: SDUPTHER

## 2021-10-06 RX ORDER — ALLOPURINOL 300 MG/1
300 TABLET ORAL DAILY
Qty: 90 TABLET | Refills: 0 | Status: SHIPPED | OUTPATIENT
Start: 2021-10-06 | End: 2022-02-08 | Stop reason: SDUPTHER

## 2022-02-01 ENCOUNTER — OFFICE VISIT (OUTPATIENT)
Dept: OBGYN CLINIC | Facility: CLINIC | Age: 57
End: 2022-02-01
Payer: COMMERCIAL

## 2022-02-01 ENCOUNTER — APPOINTMENT (OUTPATIENT)
Dept: RADIOLOGY | Facility: CLINIC | Age: 57
End: 2022-02-01
Payer: COMMERCIAL

## 2022-02-01 ENCOUNTER — TELEPHONE (OUTPATIENT)
Dept: OBGYN CLINIC | Facility: CLINIC | Age: 57
End: 2022-02-01

## 2022-02-01 VITALS
HEART RATE: 84 BPM | WEIGHT: 220 LBS | HEIGHT: 69 IN | BODY MASS INDEX: 32.58 KG/M2 | SYSTOLIC BLOOD PRESSURE: 168 MMHG | DIASTOLIC BLOOD PRESSURE: 90 MMHG

## 2022-02-01 DIAGNOSIS — M16.11 PRIMARY OSTEOARTHRITIS OF ONE HIP, RIGHT: Primary | ICD-10-CM

## 2022-02-01 DIAGNOSIS — M25.561 CHRONIC PAIN OF RIGHT KNEE: ICD-10-CM

## 2022-02-01 DIAGNOSIS — M25.551 PAIN IN RIGHT HIP: ICD-10-CM

## 2022-02-01 DIAGNOSIS — G89.29 CHRONIC PAIN OF RIGHT KNEE: ICD-10-CM

## 2022-02-01 DIAGNOSIS — Z01.89 ENCOUNTER FOR OTHER SPECIFIED SPECIAL EXAMINATIONS: ICD-10-CM

## 2022-02-01 PROCEDURE — 73562 X-RAY EXAM OF KNEE 3: CPT

## 2022-02-01 PROCEDURE — 73560 X-RAY EXAM OF KNEE 1 OR 2: CPT

## 2022-02-01 PROCEDURE — 1036F TOBACCO NON-USER: CPT | Performed by: ORTHOPAEDIC SURGERY

## 2022-02-01 PROCEDURE — 3008F BODY MASS INDEX DOCD: CPT | Performed by: ORTHOPAEDIC SURGERY

## 2022-02-01 PROCEDURE — 99204 OFFICE O/P NEW MOD 45 MIN: CPT | Performed by: ORTHOPAEDIC SURGERY

## 2022-02-01 PROCEDURE — 73502 X-RAY EXAM HIP UNI 2-3 VIEWS: CPT

## 2022-02-01 RX ORDER — MELOXICAM 15 MG/1
15 TABLET ORAL DAILY
Qty: 30 TABLET | Refills: 2 | Status: SHIPPED | OUTPATIENT
Start: 2022-02-01 | End: 2022-02-25 | Stop reason: SDUPTHER

## 2022-02-01 NOTE — TELEPHONE ENCOUNTER
[facility] pharmacy called into the office they state the meloxicam that has been prescribed is interacting with lisinopril 2 5 that he is taking for his BP  Please advise      Thanks!

## 2022-02-01 NOTE — PROGRESS NOTES
Assessment/Plan:  1  Primary osteoarthritis of one hip, right  meloxicam (Mobic) 15 mg tablet   2  Pain in right hip  XR hip/pelv 2-3 vws right if performed    XR knee 3 vw right non injury    meloxicam (Mobic) 15 mg tablet   3  Chronic pain of right knee  XR hip/pelv 2-3 vws right if performed    XR knee 3 vw right non injury    meloxicam (Mobic) 15 mg tablet     Scribe Attestation    I,:  Mariah Moctezuma am acting as a scribe while in the presence of the attending physician :       I,:  Daren Paul DO personally performed the services described in this documentation    as scribed in my presence :         Ceci Morrow is a pleasant 64year old who presents to the office today for an initial evaluation of his right hip and knee  Upon review of the right hip x-ray's, a thorough history and my examination, Ceci Morrow is presenting with signs and symptoms consistent with moderate to severe degenerative changes of his right hip  I discussed with the patient that due to the severe nature of his right hip osteoarthritis, he would be a candidate for a hip replacement in the future  He is in very active individual and notes his activities of daily living are being limited due to the severe nature of his right hip pain  I discussed with the patient that due to his age I do not recommend a hip replacement at this time due to the replacement only lasting about 20 years  At this time, the patient is not interested in pursuing a right total hip arthroplasty  Non-operative treatments were discussed with the patient in the forms of a prescription for meloxicam 15 mg  Meloxicam 15 mg was sent into the patient's pharmacy at today's visit  I discussed with the patient if he sees no relief from the use of meloxicam we can consider a fluoro-guided right hip corticosteroid injection  I discussed with the patient that he may use Tylenol with meloxicam as needed to help alleviate his pain    I will follow-up with him in 3 months for a clinical re-evaluation  He understood and had no further questions  Upon review of the right knee x-ray's, a thorough history and my examination, Kirt Hernandez is presenting with signs and symptoms consistent with age-appropriate degenerative changes of his right knee  I discussed with the patient that knee pain is most likely referred pain from his hip  Upon review of the x-rays, he does have a comminuted fracture of his left patella with an unknown time of injury  After discussing this with the patient, he denies any specific mechanism of injury or trauma  He is asymptomatic  I will follow-up with him as needed  He understood and had no further questions  Subjective: Initial Evaluation of Right Hip and Knee    Patient ID: Chino Rodríguez is a 64 y o  male who presents to the office today for an initial evaluation of his right hip and knee  In regards to his right hip, his pain has been ongoing for about 10 years with no specific mechanism of injury or trauma  He states that he will experience a daily intermittent groin pain which fluctuates between dull and sharp  He rates his pain as a 0/10 at its best and an 8/10 at its worst   He states that his pain is exacerbated with getting up from a seated position, putting on socks or shoes, getting in out of a car, ambulating up and down stairs and sleeping  He states that he will use Advil to help alleviate his pain  He denies any formal treatments of his right hip in the forms of physical therapy or corticosteroid injections  He states that his activities of daily living have been decreasing due to the pain he is experiencing into his right hip  He states that he is very active and enjoys skiing and working outside  In regards to his right knee, he states that his pain has been ongoing for about 10 years with no specific mechanism of injury or trauma  He states that he will experience a daily intermittent pain which fluctuates between dull and sharp   He rates his pain as a 0/10 at its best and an 8/10 at its worst  He localizes his pain over the medial aspect and distal medial femur  He states that his pain is exacerbated with sleeping, getting up from a seated position and ambulating up and down stairs  He denies any locking  He states that he will use Advil to help alleviate his pain  He denies any formal treatments of his right hip in the forms of physical therapy or corticosteroid injections  He states that his activities of daily living have been decreasing due to the pain he is experiencing into his right knee  Review of Systems   Constitutional: Positive for activity change  Negative for chills, fever and unexpected weight change  HENT: Negative for hearing loss, nosebleeds and sore throat  Eyes: Negative for pain, redness and visual disturbance  Respiratory: Negative for cough, shortness of breath and wheezing  Cardiovascular: Negative for chest pain, palpitations and leg swelling  Gastrointestinal: Negative for abdominal pain, nausea and vomiting  Endocrine: Negative for polyphagia and polyuria  Genitourinary: Negative for dysuria and hematuria  Musculoskeletal: Positive for arthralgias, gait problem, joint swelling and myalgias  Skin: Negative for rash and wound  Neurological: Negative for dizziness, numbness and headaches  Psychiatric/Behavioral: Negative for decreased concentration  The patient is not nervous/anxious  Past Medical History:   Diagnosis Date    Diabetes mellitus ([facility] [address] )     type 2    Gout     Hypercholesteremia        Past Surgical History:   Procedure Laterality Date    APPENDECTOMY      COLONOSCOPY N/A 2016    Procedure: COLONOSCOPY;  Surgeon: Vicente Hernandez MD;  Location: [facility] GI [facility];   Service:        Family History   Problem Relation Age of Onset    Diabetes Mother             Diabetes Father            Keely Michaud Cancer Father     Diabetes Sister     Colon cancer Family        Social History     Occupational History    Not on file   Tobacco Use    Smoking status: Former Smoker     Packs/day: 2 00     Years: 15 00     Pack years: 30 00     Types: Cigarettes     Start date: 1980     Quit date: 2008     Years since quittin 4    Smokeless tobacco: Never Used    Tobacco comment: quit 10 years ago   Vaping Use    Vaping Use: Never used   Substance and Sexual Activity    Alcohol use: Yes     Alcohol/week: 15 0 standard drinks     Types: 5 Cans of beer, 10 Standard drinks or equivalent per week     Comment: beer two to three times per week Ailin Sanket     Drug use: No    Sexual activity: Yes     Partners: Female     Birth control/protection: Male Sterilization         Current Outpatient Medications:     allopurinol (ZYLOPRIM) 300 mg tablet, Take 1 tablet (300 mg total) by mouth daily, Disp: 90 tablet, Rfl: 0    Garlic 235 MG CAPS, , Disp: , Rfl:     lisinopril (ZESTRIL) 2 5 mg tablet, Take 1 tablet (2 5 mg total) by mouth daily, Disp: 90 tablet, Rfl: 0    simvastatin (ZOCOR) 20 mg tablet, Take 1 tablet (20 mg total) by mouth daily, Disp: 90 tablet, Rfl: 0    meloxicam (Mobic) 15 mg tablet, Take 1 tablet (15 mg total) by mouth daily, Disp: 30 tablet, Rfl: 2    No Known Allergies    Objective:  Vitals:    22 0932   BP: 168/90   Pulse: 84       Body mass index is 32 49 kg/m²  Right Knee Exam     Tenderness   The patient is experiencing no tenderness       Range of Motion   Extension: 0   Flexion: 120     Tests   Varus: negative Valgus: negative  Drawer:  Anterior - negative    Posterior - negative    Other   Erythema: absent  Sensation: normal  Pulse: present  Swelling: none  Effusion: no effusion present    Comments:  No erythema, warmth or signs of infections  No crepitus appreciated  Patella tracks flat and midline  Knee is stable on ligamentous exam at 0, 30, 90 degrees  Neurovascularly intact         Right Hip Exam     Tenderness   The patient is experiencing no tenderness  Range of Motion   Abduction: 35   Adduction: 30   Extension: 0   External rotation: 30   Internal rotation: 5     Muscle Strength   Abduction: 5/5   Adduction: 5/5   Flexion: 5/5     Other   Erythema: absent  Scars: absent  Sensation: normal  Pulse: present    Comments:  Positive Stinchfield  Neurovascularly intact          Observations     Right Knee   Negative for effusion  Physical Exam  Vitals reviewed  Constitutional:       Appearance: He is well-developed  HENT:      Head: Normocephalic and atraumatic  Eyes:      Conjunctiva/sclera: Conjunctivae normal       Pupils: Pupils are equal, round, and reactive to light  Cardiovascular:      Rate and Rhythm: Normal rate  Pulmonary:      Effort: Pulmonary effort is normal  No respiratory distress  Musculoskeletal:      Cervical back: Normal range of motion and neck supple  Right knee: No effusion  Comments: As noted in HPI   Skin:     General: Skin is warm and dry  Neurological:      Mental Status: He is alert and oriented to person, place, and time  Psychiatric:         Behavior: Behavior normal          I have personally reviewed pertinent films in PACS  X-rays performed in the office today of his right hip demonstrates moderate to severe degenerative changes of the hip  X-ray's demonstrates joint space narrowing, sclerosis, osteophytes and subchondral cysts  There are no acute fractures, dislocations, lytic or blastic lesions  X-rays performed in the office today of his right knee demonstrates age-appropriate degenerative changes  There are no acute fractures, dislocations, lytic or blastic lesions  Comparison x-rays performed in the office today of his left knee demonstrates a comminuted patella fracture

## 2022-02-08 PROCEDURE — 4010F ACE/ARB THERAPY RXD/TAKEN: CPT | Performed by: ORTHOPAEDIC SURGERY

## 2022-02-22 ENCOUNTER — TELEPHONE (OUTPATIENT)
Dept: OBGYN CLINIC | Facility: CLINIC | Age: 57
End: 2022-02-22

## 2022-02-22 NOTE — TELEPHONE ENCOUNTER
This patient was seen in our office on 2/2/22, and has now failed treatment with NSAIDs  Please call to schedule a fluoro guided right hip intra-articular cortisone injection with Dr Anjelica Castro  We will see him back in May as planned   Thanks

## 2022-02-22 NOTE — TELEPHONE ENCOUNTER
Scheduled pt for right hip for 3/10/22    Went over pre-procedure instructions below:  Nothing to eat or drink 1 hr prior to procedure  Need to arrange transportation  Proper clothing for procedure  No vaccines 2 weeks prior or after procedure  If ill or placed on antibiotics please call to reschedule

## 2022-02-22 NOTE — TELEPHONE ENCOUNTER
Patient called into the office, he states that the meloxicam given did not provide any relief  I did see that in the note it stated possibility of considering a fluoro-guided right hip injection if the meloxicam had provided no releif  Should the patient follow up in office to discuss further? Please advise       Thanks !

## 2022-02-23 ENCOUNTER — TELEPHONE (OUTPATIENT)
Dept: GASTROENTEROLOGY | Facility: CLINIC | Age: 57
End: 2022-02-23

## 2022-02-23 NOTE — TELEPHONE ENCOUNTER
Patients GI provider:  Dr Barbara Babb    Number to return call: [phone]    Reason for call: Pt calling to schedule colonoscopy with Dr Barbara Babb      Scheduled procedure/appointment date if applicable: N/A

## 2022-02-25 DIAGNOSIS — M25.551 PAIN IN RIGHT HIP: ICD-10-CM

## 2022-02-25 DIAGNOSIS — M16.11 PRIMARY OSTEOARTHRITIS OF ONE HIP, RIGHT: ICD-10-CM

## 2022-02-25 DIAGNOSIS — M25.561 CHRONIC PAIN OF RIGHT KNEE: ICD-10-CM

## 2022-02-25 DIAGNOSIS — G89.29 CHRONIC PAIN OF RIGHT KNEE: ICD-10-CM

## 2022-02-25 RX ORDER — MELOXICAM 15 MG/1
15 TABLET ORAL DAILY
Qty: 30 TABLET | Refills: 0 | Status: SHIPPED | OUTPATIENT
Start: 2022-02-25 | End: 2022-04-29 | Stop reason: SDUPTHER

## 2022-02-28 ENCOUNTER — TELEPHONE (OUTPATIENT)
Dept: GASTROENTEROLOGY | Facility: CLINIC | Age: 57
End: 2022-02-28

## 2022-02-28 DIAGNOSIS — Z86.010 HISTORY OF COLON POLYPS: Primary | ICD-10-CM

## 2022-02-28 RX ORDER — SODIUM, POTASSIUM,MAG SULFATES 17.5-3.13G
1 SOLUTION, RECONSTITUTED, ORAL ORAL ONCE
Qty: 354 ML | Refills: 0 | Status: SHIPPED | OUTPATIENT
Start: 2022-02-28 | End: 2022-05-11 | Stop reason: ALTCHOICE

## 2022-02-28 NOTE — TELEPHONE ENCOUNTER
Patient is scheduled for colonoscopy on May 11, 2022 at [address] [place] [address] with Johnnette Mcardle, MD  Patient is aware of pre-procedure prep of Suprep and they will be called the day prior between 2 and 6 pm for time to report for procedure  Pre-procedure prep has been given to the patient  via E-mail on February 28 , 2022

## 2022-03-10 ENCOUNTER — HOSPITAL ENCOUNTER (OUTPATIENT)
Facility: AMBULARY SURGERY CENTER | Age: 57
Setting detail: OUTPATIENT SURGERY
Discharge: HOME | End: 2022-03-10
Attending: ANESTHESIOLOGY | Admitting: ANESTHESIOLOGY
Payer: COMMERCIAL

## 2022-03-10 ENCOUNTER — APPOINTMENT (OUTPATIENT)
Dept: RADIOLOGY | Facility: HOSPITAL | Age: 57
End: 2022-03-10
Payer: COMMERCIAL

## 2022-03-10 VITALS
SYSTOLIC BLOOD PRESSURE: 170 MMHG | TEMPERATURE: 97.5 F | HEART RATE: 64 BPM | OXYGEN SATURATION: 99 % | RESPIRATION RATE: 18 BRPM | DIASTOLIC BLOOD PRESSURE: 100 MMHG

## 2022-03-10 PROCEDURE — 20610 DRAIN/INJ JOINT/BURSA W/O US: CPT | Performed by: ANESTHESIOLOGY

## 2022-03-10 PROCEDURE — 77002 NEEDLE LOCALIZATION BY XRAY: CPT

## 2022-03-10 PROCEDURE — 77002 NEEDLE LOCALIZATION BY XRAY: CPT | Performed by: ANESTHESIOLOGY

## 2022-03-10 PROCEDURE — 20610 DRAIN/INJ JOINT/BURSA W/O US: CPT

## 2022-03-10 RX ORDER — LIDOCAINE WITH 8.4% SOD BICARB 0.9%(10ML)
SYRINGE (ML) INJECTION AS NEEDED
Status: DISCONTINUED | OUTPATIENT
Start: 2022-03-10 | End: 2022-03-10 | Stop reason: HOSPADM

## 2022-03-10 NOTE — DISCHARGE INSTRUCTIONS

## 2022-03-10 NOTE — H&P
History of Present Illness: The patient is a 64 y o  male who presents with complaints of right hip pain  Patient Active Problem List   Diagnosis    Arthralgia of multiple joints    Benign hypertension    Gout    Hyperlipidemia    Lumbar radiculopathy    Hypercalcemia    Chronic uveitis of both eyes    Family history of melanoma       Past Medical History:   Diagnosis Date    Diabetes mellitus ([address] )     type 2    Gout     Hypercholesteremia        Past Surgical History:   Procedure Laterality Date    APPENDECTOMY      COLONOSCOPY N/A 8/31/2016    Procedure: COLONOSCOPY;  Surgeon: Lloyd Short MD;  Location: [facility] GI LAB; Service:        No current facility-administered medications for this encounter  No Known Allergies    Physical Exam:   Vitals:    03/10/22 1340   BP: 158/96   Pulse: 75   Resp: 18   Temp: 97 5 °F (36 4 °C)   SpO2: 98%     General: Awake, Alert, Oriented x 3, Mood and affect appropriate  Respiratory: Respirations even and unlabored  Cardiovascular: Peripheral pulses intact; no edema  Musculoskeletal Exam:  Tenderness of right hip joint    ASA Score: 2    Patient/Chart Verification  Patient ID Verified: Verbal,Armband  ID Band Applied: Yes  Consents Confirmed: Procedural  H&P( within 30 days) Verified: Yes  Interval H&P(within 24 hr) Complete (required for Outpatients and Surgery Admit only): Yes  Beta Blocker given : N/A  Pre-op Lab/Test Results Available:  In chart  Does Patient Have a Prosthetic Device/Implant: No    Assessment:  Right hip pain    Plan:  Proceed with right hip intra-articular joint injection

## 2022-03-10 NOTE — OP NOTE
ATTENDING PHYSICIAN:  Jade Manjarrez MD      PREPROCEDURE DIAGNOSIS:  Right hip pain  POSTPROCEDURE DIAGNOSIS: Right hip pain  PROCEDURE: Right intraarticular hip injection under fluoroscopic guidance  ANESTHESIA:  Local     ESTIMATED BLOOD LOSS:  Minimal     COMPLICATIONS:  None  LOCATION:  [address]  CONSENT:  Today's procedure, its risks, benefits, and alternatives were explained in detail to the patient  Risks include, but are not limited bleeding, infection, hematoma formation, abscess formation, weakness, nerve irritation or damage, failure of the pain to improve, or potential worsening of the pain  The patient verbalized understanding and wished to proceed with the procedure  Written informed consent was thereby obtained  DESCRIPTION OF THE PROCEDURE:  After written informed consent was obtained, the patient was taken to the fluoroscopy suite and placed in the supine position  Anatomical landmarks were identified by way of fluoroscopy in the AP and oblique views  The patient's hip region was prepped using antiseptic solution and draped in the usual sterile fashion  Strict aseptic technique was utilized  The skin and subcutaneous tissues at the needle entry site was infiltrated with a small amount of 1% preservative free Lidocaine using a 25-gauge 1-1/2 inch needle  A 22 gauge 3-1/2 inch needle was then advanced incrementally under fluoroscopic guidance towards the identified point until os was contacted and the joint space was entered  After negative aspiration, 1 ml of contrast solution was injected showing an appropriate arthrogram   Subsequently, a solution consisting of 4 ml of 0 25% Bupivacaine mixed with 1 ml of Depo-Medrol 80 mg/ml was injected slowly  All needles were removed with the tips intact and hemostasis was maintained throughout    The patient tolerated the procedure well, and there were no apparent paresthesias or complications noted during or following this procedure  The antiseptic was wiped clean and Band-Aids were placed as appropriate  The patient was transferred to the recovery area and was observed for an appropriate period of time during which the patient remained hemodynamically stable and neurovascularly intact, as the patient was prior to the procedure  The patient was subsequently discharged to home in stable condition with supervision  The patient was instructed to call the office in a few days for an update  Discharge instructions were provided  I was present and participated in all key and critical portions of this procedure      Vincent Klinefelter, MD  3/10/2022  2:16 PM

## 2022-03-17 ENCOUNTER — TELEPHONE (OUTPATIENT)
Dept: PAIN MEDICINE | Facility: MEDICAL CENTER | Age: 57
End: 2022-03-17

## 2022-04-29 ENCOUNTER — OFFICE VISIT (OUTPATIENT)
Dept: OBGYN CLINIC | Facility: CLINIC | Age: 57
End: 2022-04-29
Payer: COMMERCIAL

## 2022-04-29 VITALS
WEIGHT: 220 LBS | DIASTOLIC BLOOD PRESSURE: 80 MMHG | SYSTOLIC BLOOD PRESSURE: 148 MMHG | HEIGHT: 69 IN | BODY MASS INDEX: 32.58 KG/M2 | HEART RATE: 60 BPM

## 2022-04-29 DIAGNOSIS — M16.11 PRIMARY OSTEOARTHRITIS OF ONE HIP, RIGHT: Primary | ICD-10-CM

## 2022-04-29 DIAGNOSIS — G89.29 CHRONIC RIGHT HIP PAIN: ICD-10-CM

## 2022-04-29 DIAGNOSIS — Z01.818 PREOP EXAMINATION: ICD-10-CM

## 2022-04-29 DIAGNOSIS — M25.561 CHRONIC PAIN OF RIGHT KNEE: ICD-10-CM

## 2022-04-29 DIAGNOSIS — M25.551 PAIN IN RIGHT HIP: ICD-10-CM

## 2022-04-29 DIAGNOSIS — M25.551 CHRONIC RIGHT HIP PAIN: ICD-10-CM

## 2022-04-29 DIAGNOSIS — G89.29 CHRONIC PAIN OF RIGHT KNEE: ICD-10-CM

## 2022-04-29 PROCEDURE — 1036F TOBACCO NON-USER: CPT | Performed by: ORTHOPAEDIC SURGERY

## 2022-04-29 PROCEDURE — 3008F BODY MASS INDEX DOCD: CPT | Performed by: ORTHOPAEDIC SURGERY

## 2022-04-29 PROCEDURE — 99215 OFFICE O/P EST HI 40 MIN: CPT | Performed by: ORTHOPAEDIC SURGERY

## 2022-04-29 RX ORDER — ZINC SULFATE 50(220)MG
220 CAPSULE ORAL DAILY
Qty: 30 CAPSULE | Refills: 0 | Status: ON HOLD | OUTPATIENT
Start: 2022-04-29 | End: 2022-07-26

## 2022-04-29 RX ORDER — ONDANSETRON 2 MG/ML
4 INJECTION INTRAMUSCULAR; INTRAVENOUS ONCE
Status: CANCELLED | OUTPATIENT
Start: 2022-07-26 | End: 2022-04-29

## 2022-04-29 RX ORDER — CHLORHEXIDINE GLUCONATE 0.12 MG/ML
15 RINSE ORAL ONCE
Status: CANCELLED | OUTPATIENT
Start: 2022-07-26 | End: 2022-04-29

## 2022-04-29 RX ORDER — FERROUS SULFATE TAB EC 324 MG (65 MG FE EQUIVALENT) 324 (65 FE) MG
324 TABLET DELAYED RESPONSE ORAL
Qty: 30 TABLET | Refills: 0 | Status: ON HOLD | OUTPATIENT
Start: 2022-04-29 | End: 2022-07-26

## 2022-04-29 RX ORDER — ACETAMINOPHEN 325 MG/1
975 TABLET ORAL ONCE
Status: CANCELLED | OUTPATIENT
Start: 2022-07-26 | End: 2022-04-29

## 2022-04-29 RX ORDER — MELOXICAM 15 MG/1
15 TABLET ORAL DAILY
Qty: 30 TABLET | Refills: 2 | Status: SHIPPED | OUTPATIENT
Start: 2022-04-29 | End: 2022-04-29 | Stop reason: SDUPTHER

## 2022-04-29 RX ORDER — CEFAZOLIN SODIUM 2 G/50ML
2000 SOLUTION INTRAVENOUS ONCE
Status: CANCELLED | OUTPATIENT
Start: 2022-07-26 | End: 2022-04-29

## 2022-04-29 RX ORDER — SODIUM CHLORIDE 9 MG/ML
125 INJECTION, SOLUTION INTRAVENOUS CONTINUOUS
Status: CANCELLED | OUTPATIENT
Start: 2022-07-26

## 2022-04-29 RX ORDER — DOCUSATE SODIUM 100 MG/1
100 CAPSULE, LIQUID FILLED ORAL 2 TIMES DAILY
Status: CANCELLED | OUTPATIENT
Start: 2022-07-26

## 2022-04-29 RX ORDER — SENNOSIDES 8.6 MG
1 TABLET ORAL DAILY
Status: CANCELLED | OUTPATIENT
Start: 2022-07-26

## 2022-04-29 RX ORDER — FOLIC ACID 1 MG/1
1 TABLET ORAL DAILY
Qty: 30 TABLET | Refills: 0 | Status: ON HOLD | OUTPATIENT
Start: 2022-04-29 | End: 2022-07-26

## 2022-04-29 RX ORDER — FOLIC ACID 1 MG/1
1 TABLET ORAL DAILY
Qty: 30 TABLET | Refills: 2 | Status: SHIPPED | OUTPATIENT
Start: 2022-04-29 | End: 2022-04-29

## 2022-04-29 RX ORDER — ASCORBIC ACID 500 MG
500 TABLET ORAL 2 TIMES DAILY
Qty: 180 TABLET | Refills: 0 | Status: SHIPPED | OUTPATIENT
Start: 2022-04-29 | End: 2022-04-29

## 2022-04-29 RX ORDER — MELOXICAM 15 MG/1
15 TABLET ORAL DAILY
Qty: 30 TABLET | Refills: 2 | Status: SHIPPED | OUTPATIENT
Start: 2022-04-29 | End: 2022-07-26

## 2022-04-29 RX ORDER — FERROUS SULFATE TAB EC 324 MG (65 MG FE EQUIVALENT) 324 (65 FE) MG
324 TABLET DELAYED RESPONSE ORAL
Qty: 60 TABLET | Refills: 2 | Status: SHIPPED | OUTPATIENT
Start: 2022-04-29 | End: 2022-04-29

## 2022-04-29 RX ORDER — MELATONIN
1000 DAILY
Qty: 30 TABLET | Refills: 0 | Status: ON HOLD | OUTPATIENT
Start: 2022-04-29 | End: 2022-07-26

## 2022-04-29 RX ORDER — MULTIVIT-MIN/IRON FUM/FOLIC AC 7.5 MG-4
1 TABLET ORAL DAILY
Qty: 90 TABLET | Refills: 0 | Status: SHIPPED | OUTPATIENT
Start: 2022-04-29 | End: 2022-07-28

## 2022-04-29 NOTE — PROGRESS NOTES
Assessment/Plan:  1  Primary osteoarthritis of one hip, right  Comprehensive metabolic panel    Hemoglobin A1C W/EAG Estimation    CBC and differential    Protime-INR    APTT    Ambulatory referral to Family Practice    Ambulatory referral to Physical Therapy    Case request operating room: ARTHROPLASTY HIP TOTAL    EKG 12 lead    ascorbic acid (VITAMIN C) 500 MG tablet    ferrous sulfate 324 (65 Fe) mg    folic acid (FOLVITE) 1 mg tablet    Multiple Vitamins-Minerals (multivitamin with minerals) tablet    Comprehensive metabolic panel    CBC and differential    Protime-INR    APTT    Case request operating room: ARTHROPLASTY HIP TOTAL    PAT Covid Screening    MRSA culture    MRSA culture    meloxicam (Mobic) 15 mg tablet    DISCONTINUED: meloxicam (Mobic) 15 mg tablet   2  Chronic right hip pain     3  Chronic pain of right knee  meloxicam (Mobic) 15 mg tablet    DISCONTINUED: meloxicam (Mobic) 15 mg tablet   4  Pain in right hip  meloxicam (Mobic) 15 mg tablet    DISCONTINUED: meloxicam (Mobic) 15 mg tablet       63 yo male with debilitating chronic right hip pain secondary to osteoarthritis  He obtained only a few weeks of relief from prior steroid injection and would like to proceed with right total hip replacement at this time  Due to his age, discussed that there is a chance he will need revision surgery in the future due to normal wear of prosthesis over time  Recommend weight bearing to tolerance right lower extremity, continue Meloxicam for pain control up until the time of surgery  The pre paxton and postoperative expectations surrounding direct anterior right total hip arthroplasty were discussed with the patient today  Consents were signed and placed in chart  Please see risk discussion below  He will need clearance from his PCP and I will defer to the primary care provider if cardiac clearance needed prior to his procedure    Patient denies a history of DVT/PE, GI bleed, peptic ulcer disease, cancer, MRSA infection, turmeric use, smoking, prior bariatric surgery  He will be a good candidate for outpatient physical therapy following his discharge from the hospital   Due to his recent right hip intra-articular steroid injection he signed eligible for surgery until after 6/10/22  All of his questions were addressed today and he was introduced to our surgical scheduler for scheduling of his procedure  Subjective:  Right hip pain    Patient ID: Balaji Brar is a 64 y o  male  Chronic right hip pain been present for years  Pain mostly located right groin region sharp occurs daily worse with activities such as walking, exercising, rising from seated position or getting into car  The steroid injection to his right hip helped for a few weeks but then the pain returned  He has also been taking meloxicam without full relief of his pain which continues to limit his activities of daily living  He has a history of hypertension on lisinopril, no blood thinners  Never had surgery on this leg in the past   His activity-related pain continues to affect his overall quality of life and is here to discuss further treatment options  Review of Systems   Constitutional: Negative for chills and fever  HENT: Negative for ear pain, sinus pain and sore throat  Eyes: Negative for pain and visual disturbance  Respiratory: Negative for cough and shortness of breath  Cardiovascular: Negative for chest pain  Gastrointestinal: Negative for abdominal pain, nausea and vomiting  Endocrine: Negative for cold intolerance and heat intolerance  Genitourinary: Negative for difficulty urinating and dysuria  Skin: Negative for rash and wound  Allergic/Immunologic: Negative for environmental allergies and food allergies  Neurological: Negative for weakness, numbness and headaches  Hematological: Does not bruise/bleed easily           Past Medical History:   Diagnosis Date    Diabetes mellitus ([facility] [address] )     type 2    Gout  Hypercholesteremia        Past Surgical History:   Procedure Laterality Date    APPENDECTOMY      COLONOSCOPY N/A 2016    Procedure: COLONOSCOPY;  Surgeon: Parul Jones MD;  Location: [facility] GI LAB; Service:    [facility] INJECTION RIGHT HIP (NON ARTHROGRAM)  3/10/2022    CT ARTHROCENTESIS ASPIR&/INJ MAJOR JT/BURSA W/O US Right 3/10/2022    Procedure: Hip intra-articular cortisone injection ( [id number] ); Surgeon: Roxanna Gonsalves MD;  Location: [facility] MAIN OR;  Service: Pain Management        Family History   Problem Relation Age of Onset    Diabetes Mother             Diabetes Father            Julieann Frankel Cancer Father     Diabetes Sister     Colon cancer Family        Social History     Occupational History    Not on file   Tobacco Use    Smoking status: Former Smoker     Packs/day: 2 00     Years: 15 00     Pack years: 30 00     Types: Cigarettes     Start date: 1980     Quit date: 2008     Years since quittin 6    Smokeless tobacco: Never Used    Tobacco comment: quit 10 years ago   Vaping Use    Vaping Use: Never used   Substance and Sexual Activity    Alcohol use:  Yes     Alcohol/week: 15 0 standard drinks     Types: 5 Cans of beer, 10 Standard drinks or equivalent per week     Comment: beer two to three times per week [facility]     Drug use: No    Sexual activity: Yes     Partners: Female     Birth control/protection: Male Sterilization         Current Outpatient Medications:     allopurinol (ZYLOPRIM) 300 mg tablet, Take 1 tablet (300 mg total) by mouth daily, Disp: 90 tablet, Rfl: 2    ascorbic acid (VITAMIN C) 500 MG tablet, Take 1 tablet (500 mg total) by mouth 2 (two) times a day, Disp: 180 tablet, Rfl: 0    ferrous sulfate 324 (65 Fe) mg, Take 1 tablet (324 mg total) by mouth 2 (two) times a day before meals, Disp: 60 tablet, Rfl: 2    folic acid (FOLVITE) 1 mg tablet, Take 1 tablet (1 mg total) by mouth daily, Disp: 30 tablet, Rfl: 2    Garlic 587 MG CAPS, , Disp: , Rfl:     lisinopril (ZESTRIL) 2 5 mg tablet, Take 1 tablet (2 5 mg total) by mouth daily, Disp: 90 tablet, Rfl: 2    meloxicam (Mobic) 15 mg tablet, Take 1 tablet (15 mg total) by mouth daily, Disp: 30 tablet, Rfl: 2    Multiple Vitamins-Minerals (multivitamin with minerals) tablet, Take 1 tablet by mouth daily, Disp: 90 tablet, Rfl: 0    Na Sulfate-K Sulfate-Mg Sulf (Suprep Bowel Prep Kit) 17 5-3 13-1 6 GM/177ML SOLN, Take 1 kit by mouth once for 1 dose, Disp: 354 mL, Rfl: 0    simvastatin (ZOCOR) 20 mg tablet, Take 1 tablet (20 mg total) by mouth daily, Disp: 90 tablet, Rfl: 2    No Known Allergies    Objective:  Vitals:    04/29/22 0919   BP: 148/80   Pulse: 60       Body mass index is 32 49 kg/m²  Right Hip Exam     Tenderness   The patient is experiencing no tenderness  Range of Motion   Abduction: 30   Adduction: 15   Extension: 0   Flexion: 100   External rotation: 20   Internal rotation: 20     Muscle Strength   The patient has normal right hip strength  Tests   GINGER: positive    Other   Sensation: normal  Pulse: present    Comments:  Painful ROM right hip especially with flexion internal external rotation  Physical Exam  Vitals reviewed  Constitutional:       Appearance: Normal appearance  HENT:      Head: Normocephalic and atraumatic  Eyes:      General: No scleral icterus  Conjunctiva/sclera: Conjunctivae normal    Cardiovascular:      Rate and Rhythm: Normal rate and regular rhythm  Pulses: Normal pulses  Pulmonary:      Effort: Pulmonary effort is normal  No respiratory distress  Breath sounds: No stridor  Musculoskeletal:         General: Normal range of motion  Skin:     General: Skin is warm and dry  Capillary Refill: Capillary refill takes less than 2 seconds  Coloration: Skin is not jaundiced  Neurological:      General: No focal deficit present  Mental Status: He is alert     Psychiatric:         Mood and Affect: Mood normal          Behavior: Behavior normal          I have personally reviewed pertinent films in PACS  Xray AP and lateral right hip shows severe right hip joint space narrowing, subchondral sclerosis, subchondral cysts, osteophyte formation, flattening of superior femoral head  No lytic or blastic lesion  The patient was counseled in detail regarding the diagnosis, the treatment options available, the prognosis of each treatment option, the potential risks and complications  These are, but are not limited to; deep vein thrombosis, pulmonary embolism, neurologic and vascular injury, infection, hematoma, instability, dislocation, loosening, need for amputation, leg length discrepancy, reflex sympathetic dystrophy, persistent and chronic limp, chronic pain, acute pain, heterotopic ossification, stiffness of the hip, ankylosis of the hip, chronic leg swelling, fracture, screw or prosthetic perforation, medical morbidities, death, heart attack, and stroke  The patient's questions were answered in detail  The patient demonstrates understanding of these risks and wishes to proceed with surgery  Christiane LEDEZMA    Division of Adult Reconstruction  Department of Orthopaedic Surgery  [facility]

## 2022-05-11 ENCOUNTER — HOSPITAL ENCOUNTER (OUTPATIENT)
Dept: GASTROENTEROLOGY | Facility: AMBULARY SURGERY CENTER | Age: 57
Setting detail: OUTPATIENT SURGERY
Discharge: HOME | End: 2022-05-11
Attending: INTERNAL MEDICINE
Payer: COMMERCIAL

## 2022-05-11 ENCOUNTER — ANESTHESIA EVENT (OUTPATIENT)
Dept: GASTROENTEROLOGY | Facility: AMBULARY SURGERY CENTER | Age: 57
End: 2022-05-11

## 2022-05-11 ENCOUNTER — ANESTHESIA (OUTPATIENT)
Dept: GASTROENTEROLOGY | Facility: AMBULARY SURGERY CENTER | Age: 57
End: 2022-05-11

## 2022-05-11 VITALS
WEIGHT: 205.69 LBS | HEIGHT: 69 IN | OXYGEN SATURATION: 98 % | SYSTOLIC BLOOD PRESSURE: 135 MMHG | HEART RATE: 58 BPM | TEMPERATURE: 97.3 F | DIASTOLIC BLOOD PRESSURE: 84 MMHG | BODY MASS INDEX: 30.47 KG/M2 | RESPIRATION RATE: 22 BRPM

## 2022-05-11 DIAGNOSIS — Z86.010 HX OF COLONIC POLYPS: ICD-10-CM

## 2022-05-11 PROBLEM — E11.9 DIABETES MELLITUS, TYPE 2 (HCC): Status: ACTIVE | Noted: 2022-05-11

## 2022-05-11 PROCEDURE — 45380 COLONOSCOPY AND BIOPSY: CPT | Performed by: INTERNAL MEDICINE

## 2022-05-11 PROCEDURE — 88305 TISSUE EXAM BY PATHOLOGIST: CPT | Performed by: PATHOLOGY

## 2022-05-11 PROCEDURE — 45385 COLONOSCOPY W/LESION REMOVAL: CPT | Performed by: INTERNAL MEDICINE

## 2022-05-11 RX ORDER — SODIUM CHLORIDE, SODIUM LACTATE, POTASSIUM CHLORIDE, CALCIUM CHLORIDE 600; 310; 30; 20 MG/100ML; MG/100ML; MG/100ML; MG/100ML
125 INJECTION, SOLUTION INTRAVENOUS CONTINUOUS
Status: DISCONTINUED | OUTPATIENT
Start: 2022-05-11 | End: 2022-05-15 | Stop reason: HOSPADM

## 2022-05-11 RX ORDER — SODIUM CHLORIDE, SODIUM LACTATE, POTASSIUM CHLORIDE, CALCIUM CHLORIDE 600; 310; 30; 20 MG/100ML; MG/100ML; MG/100ML; MG/100ML
INJECTION, SOLUTION INTRAVENOUS CONTINUOUS PRN
Status: DISCONTINUED | OUTPATIENT
Start: 2022-05-11 | End: 2022-05-11

## 2022-05-11 RX ORDER — PROPOFOL 10 MG/ML
INJECTION, EMULSION INTRAVENOUS AS NEEDED
Status: DISCONTINUED | OUTPATIENT
Start: 2022-05-11 | End: 2022-05-11

## 2022-05-11 RX ORDER — PROPOFOL 10 MG/ML
INJECTION, EMULSION INTRAVENOUS CONTINUOUS PRN
Status: DISCONTINUED | OUTPATIENT
Start: 2022-05-11 | End: 2022-05-11

## 2022-05-11 RX ORDER — LIDOCAINE HYDROCHLORIDE 10 MG/ML
INJECTION, SOLUTION EPIDURAL; INFILTRATION; INTRACAUDAL; PERINEURAL AS NEEDED
Status: DISCONTINUED | OUTPATIENT
Start: 2022-05-11 | End: 2022-05-11

## 2022-05-11 RX ORDER — ONDANSETRON 2 MG/ML
4 INJECTION INTRAMUSCULAR; INTRAVENOUS ONCE AS NEEDED
Status: CANCELLED | OUTPATIENT
Start: 2022-05-11

## 2022-05-11 RX ADMIN — PROPOFOL 120 MCG/KG/MIN: 10 INJECTION, EMULSION INTRAVENOUS at 10:53

## 2022-05-11 RX ADMIN — SODIUM CHLORIDE, SODIUM LACTATE, POTASSIUM CHLORIDE, AND CALCIUM CHLORIDE 125 ML/HR: .6; .31; .03; .02 INJECTION, SOLUTION INTRAVENOUS at 10:48

## 2022-05-11 RX ADMIN — PROPOFOL 50 MG: 10 INJECTION, EMULSION INTRAVENOUS at 10:53

## 2022-05-11 RX ADMIN — PROPOFOL 50 MG: 10 INJECTION, EMULSION INTRAVENOUS at 11:01

## 2022-05-11 RX ADMIN — LIDOCAINE HYDROCHLORIDE 50 MG: 10 INJECTION, SOLUTION EPIDURAL; INFILTRATION; INTRACAUDAL; PERINEURAL at 10:53

## 2022-05-11 RX ADMIN — SODIUM CHLORIDE, SODIUM LACTATE, POTASSIUM CHLORIDE, AND CALCIUM CHLORIDE: .6; .31; .03; .02 INJECTION, SOLUTION INTRAVENOUS at 10:48

## 2022-05-11 NOTE — ANESTHESIA PREPROCEDURE EVALUATION
Procedure:  COLONOSCOPY    Relevant Problems   ANESTHESIA (within normal limits)      CARDIO   (+) Benign hypertension   (+) Hyperlipidemia      ENDO   (+) Diabetes mellitus, type 2 (HCC)      MUSCULOSKELETAL   (+) Gout      PULMONARY (within normal limits)        Physical Exam    Airway    Mallampati score: II  TM Distance: <3 FB  Neck ROM: full     Dental   No notable dental hx     Cardiovascular  Rhythm: regular, Rate: normal,     Pulmonary  Breath sounds clear to auscultation,     Other Findings        Anesthesia Plan  ASA Score- 2     Anesthesia Type- IV sedation with anesthesia with ASA Monitors  Additional Monitors:   Airway Plan:           Plan Factors-Exercise tolerance (METS): >4 METS  Chart reviewed  Existing labs reviewed  Patient summary reviewed  Patient is not a current smoker  Induction-     Postoperative Plan-     Informed Consent- Anesthetic plan and risks discussed with patient  I personally reviewed this patient with the CRNA  Discussed and agreed on the Anesthesia Plan with the CRNA  Belinda Newby

## 2022-05-11 NOTE — ANESTHESIA POSTPROCEDURE EVALUATION
Post-Op Assessment Note    CV Status:  Stable  Pain Score: 0    Pain management: adequate     Mental Status:  Awake   Hydration Status:  Stable   PONV Controlled:  None   Airway Patency:  Patent      Post Op Vitals Reviewed: Yes      Staff: CRNA         No complications documented      BP      Temp     Pulse     Resp      SpO2   99

## 2022-05-11 NOTE — H&P
History and Physical - [facility]  Emperatriz Foreman 64 y o  male MRN: [id number]    HPI: Emperatriz Foreman is a 64y o  year old male who presents with hx of colon polyps  Review of Systems    Historical Information   Past Medical History:   Diagnosis Date    Diabetes mellitus ([address] )     type 2 diet controlled    Gout     Hypercholesteremia      Past Surgical History:   Procedure Laterality Date    APPENDECTOMY      COLONOSCOPY N/A 2016    Procedure: COLONOSCOPY;  Surgeon: Gurvinder Onofre MD;  Location: [facility] GI LAB; Service:    [facility] INJECTION RIGHT HIP (NON ARTHROGRAM)  3/10/2022    [place] ARTHROCENTESIS ASPIR&/INJ MAJOR JT/BURSA W/O US Right 3/10/2022    Procedure: Hip intra-articular cortisone injection (  [id number] );   Surgeon: Wilfred Michele MD;  Location: [facility] MAIN OR;  Service: Pain Management      Social History   Social History     Substance and Sexual Activity   Alcohol Use Yes    Alcohol/week: 15 0 standard drinks    Types: 5 Cans of beer, 10 Standard drinks or equivalent per week    Comment: beer two to three times per week Elvie Posey      Social History     Substance and Sexual Activity   Drug Use No     Social History     Tobacco Use   Smoking Status Former Smoker    Packs/day: 2 00    Years: 15 00    Pack years: 30 00    Types: Cigarettes    Start date: 1980   Jing Mark Quit date: 2008    Years since quittin 6   Smokeless Tobacco Never Used   Tobacco Comment    quit 10 years ago     Family History   Problem Relation Age of Onset    Diabetes Mother             Diabetes Father            Jing Mark Cancer Father     Diabetes Sister     Colon cancer Family        Meds/Allergies     (Not in a hospital admission)      No Known Allergies    Objective     /86   Pulse 80   Temp (!) 97 3 °F (36 3 °C) (Probe)   Resp 18   Ht 5' 9" (1 753 m)   Wt 93 3 kg (205 lb 11 oz)   SpO2 99%   BMI 30 38 kg/m²       PHYSICAL EXAM    Gen: NAD  CV: RRR  CHEST: Clear  ABD: soft, NT/ND  EXT: no edema  Neuro: AAO      ASSESSMENT/PLAN:  This is a 64y o  year old male here for hx of colon polyps         PLAN:   Procedure: colonoscopy

## 2022-05-11 NOTE — DISCHARGE INSTRUCTIONS
Colonoscopy   WHAT YOU NEED TO KNOW:   A colonoscopy is a procedure to examine the inside of your colon (intestine) with a scope  Polyps or tissue growths may have been removed during your colonoscopy  It is normal to feel bloated and to have some abdominal discomfort  You should be passing gas  If you have hemorrhoids or you had polyps removed, you may have a small amount of bleeding  DISCHARGE INSTRUCTIONS:   Seek care immediately if:   You have sudden, severe abdominal pain  You have problems swallowing  You have a large amount of black, sticky bowel movements or blood in your bowel movements  You have sudden trouble breathing  You feel weak, lightheaded, or faint or your heart beats faster than normal for you  Contact your healthcare provider if:   You have a fever and chills  You have nausea or are vomiting  Your abdomen is bloated or feels full and hard  You have abdominal pain  You have black, sticky bowel movements or blood in your bowel movements  You have not had a bowel movement for 3 days after your procedure  You have rash or hives  You have questions or concerns about your procedure  Activity:   Do not lift, strain, or run for 24 hours after your procedure  Rest after your procedure  You have been given medicine to relax you  Do not drive or make important decisions until the day after your procedure  Return to your normal activity as directed  Relieve gas and discomfort from bloating by lying on your right side with a heating pad on your abdomen  You may need to take short walks to help the gas move out  Eat small meals until bloating is relieved  Follow up with your healthcare provider as directed: Write down your questions so you remember to ask them during your visits  If you take a blood thinner, please review the specific instructions from your endoscopist about when you should resume it   These can be found in the Recommendation and Your Medication list sections of this After Visit Summary

## 2022-05-24 ENCOUNTER — TELEPHONE (OUTPATIENT)
Dept: OTHER | Facility: OTHER | Age: 57
End: 2022-05-24

## 2022-05-24 NOTE — TELEPHONE ENCOUNTER
Called patient, reached VM, left message results are still pending and we will call him when they are in

## 2022-06-10 ENCOUNTER — TELEPHONE (OUTPATIENT)
Dept: OBGYN CLINIC | Facility: CLINIC | Age: 57
End: 2022-06-10

## 2022-06-10 NOTE — TELEPHONE ENCOUNTER
In the last office visit note, it is documented that his BMI is 32 49  [facility] is stating that the health plan requires documented weight loss discussion in order for them to approve his surgery  (yes, the BMI is under 40, however for this health plan and purpose of surgery approval, there are certain criteria that needs to be met with regards to BMI)    BMI about 30 but below 34 99 requires a weight loss discussion  BMI above 34 99 but below 40 requires a weight loss plan

## 2022-06-13 NOTE — TELEPHONE ENCOUNTER
I've sent most recent visit with Gastro to [facility] as this ovn reflects BMI of 30 38 as this visit was after ours and reflects the drop in BMI compared to our visit

## 2022-06-27 ENCOUNTER — TELEPHONE (OUTPATIENT)
Dept: OBGYN CLINIC | Facility: CLINIC | Age: 57
End: 2022-06-27

## 2022-06-27 NOTE — TELEPHONE ENCOUNTER
Sarah Amador called in with a few questions regarding his pre op testing  He will be going to [facility] for his blood work  He will then go to  o/p dept to have the ekg & chest XR completed  He has started taking his pre op vitamins  He confirmed his med clearance appointment as well  Finally, no pre op covid testing is required as he is vaccinated & boosted

## 2022-06-28 ENCOUNTER — TELEPHONE (OUTPATIENT)
Dept: OBGYN CLINIC | Facility: HOSPITAL | Age: 57
End: 2022-06-28

## 2022-06-29 NOTE — TELEPHONE ENCOUNTER
Preoperative Elective Admission Assessment    Living Situation:    Who does pt live with: spouse  [place] of [place] [facility]  How do they enter the home: front  How many levels in home: 2   # of steps to enter home: 3  # of steps to second floor: 10, only stays on first floor  Are there handrails: No  Are there landings: No  Sleeping arrangement: first/entry floor  Where is Bathroom: entry level  Where is the tub or shower: Step in bath tub without grab bars and shower chair  Dogs or ther pets: 1 dog     First Floor Setup:   Is there a bathroom: Yes  Where would pt sleep: bed     DME: n/a     Patient's Current Level of Function: Ambulates: Independently and ADLs: Independent    Post-op Caregiver: spouse  Caregiver Name and phone number for Inpatient discharge needs: Manuel Castanon [phone]  Currently receive any HHC/aides/community supports: No     Post-op Transport: spouse  To/from hospital: spouse  To/from PT 2-3x/week: spouse  Uses community transport now: No     Outpatient Physical Therapy Site:  Site: [facility] PT  pre and post-op appts scheduled? Yes     Medication Management: self and out of bottle  Preferred Pharmacy for Post-op Medications: [facility]  Blood Management Vitamin Regimen: Pt confirms taking as prescribed  Post-op anticoagulant: to be determined by surgical team postoperatively     DC Plan: Pt plans to be discharged home      Barriers to DC identified preoperatively: none identified    BMI: 30 38    [employer]: enrolled; via text message [phone]    Patient Education:  Pt educated on post-op pain, early mobilization (POD0), Average inpt LOS, OP PT goal   Patient educated that our goal is to appropriately discharge patient based off their post-op function while striving to maintain maximal independence  The goal is to discharge patient to home and for them to attend outpatient physical therapy      Assigned to care team? Yes

## 2022-06-30 LAB
ALBUMIN SERPL-MCNC: 4.7 G/DL (ref 3.8–4.9)
ALBUMIN SERPL-MCNC: 4.7 G/DL (ref 3.8–4.9)
ALBUMIN/GLOB SERPL: 2.4 {RATIO} (ref 1.2–2.2)
ALBUMIN/GLOB SERPL: 2.4 {RATIO} (ref 1.2–2.2)
ALP SERPL-CCNC: 58 IU/L (ref 44–121)
ALP SERPL-CCNC: 58 IU/L (ref 44–121)
ALT SERPL-CCNC: 37 IU/L (ref 0–44)
ALT SERPL-CCNC: 39 IU/L (ref 0–44)
APTT PPP: 25 SEC (ref 24–33)
AST SERPL-CCNC: 23 IU/L (ref 0–40)
AST SERPL-CCNC: 27 IU/L (ref 0–40)
BASOPHILS # BLD AUTO: 0.1 X10E3/UL (ref 0–0.2)
BASOPHILS NFR BLD AUTO: 1 %
BILIRUB SERPL-MCNC: 0.8 MG/DL (ref 0–1.2)
BILIRUB SERPL-MCNC: 0.8 MG/DL (ref 0–1.2)
BUN SERPL-MCNC: 17 MG/DL (ref 6–24)
BUN SERPL-MCNC: 17 MG/DL (ref 6–24)
BUN/CREAT SERPL: 19 (ref 9–20)
BUN/CREAT SERPL: 20 (ref 9–20)
CALCIUM SERPL-MCNC: 9.7 MG/DL (ref 8.7–10.2)
CALCIUM SERPL-MCNC: 9.8 MG/DL (ref 8.7–10.2)
CHLORIDE SERPL-SCNC: 101 MMOL/L (ref 96–106)
CHLORIDE SERPL-SCNC: 101 MMOL/L (ref 96–106)
CHOLEST SERPL-MCNC: 155 MG/DL (ref 100–199)
CHOLEST/HDLC SERPL: 2.7 RATIO (ref 0–5)
CO2 SERPL-SCNC: 21 MMOL/L (ref 20–29)
CO2 SERPL-SCNC: 22 MMOL/L (ref 20–29)
CREAT SERPL-MCNC: 0.86 MG/DL (ref 0.76–1.27)
CREAT SERPL-MCNC: 0.91 MG/DL (ref 0.76–1.27)
EGFR: 102 ML/MIN/1.73
EGFR: 99 ML/MIN/1.73
EOSINOPHIL # BLD AUTO: 0.2 X10E3/UL (ref 0–0.4)
EOSINOPHIL NFR BLD AUTO: 3 %
ERYTHROCYTE [DISTWIDTH] IN BLOOD BY AUTOMATED COUNT: 13.1 % (ref 11.6–15.4)
EST. AVERAGE GLUCOSE BLD GHB EST-MCNC: 137 MG/DL
GLOBULIN SER-MCNC: 2 G/DL (ref 1.5–4.5)
GLOBULIN SER-MCNC: 2 G/DL (ref 1.5–4.5)
GLUCOSE SERPL-MCNC: 100 MG/DL (ref 65–99)
GLUCOSE SERPL-MCNC: 98 MG/DL (ref 65–99)
HBA1C MFR BLD: 6.4 % (ref 4.8–5.6)
HCT VFR BLD AUTO: 47.6 % (ref 37.5–51)
HDLC SERPL-MCNC: 58 MG/DL
HGB BLD-MCNC: 15.7 G/DL (ref 13–17.7)
IMM GRANULOCYTES # BLD: 0 X10E3/UL (ref 0–0.1)
IMM GRANULOCYTES NFR BLD: 1 %
INR PPP: 0.9 (ref 0.9–1.2)
LDLC SERPL CALC-MCNC: 78 MG/DL (ref 0–99)
LYMPHOCYTES # BLD AUTO: 1.9 X10E3/UL (ref 0.7–3.1)
LYMPHOCYTES NFR BLD AUTO: 24 %
MCH RBC QN AUTO: 30.3 PG (ref 26.6–33)
MCHC RBC AUTO-ENTMCNC: 33 G/DL (ref 31.5–35.7)
MCV RBC AUTO: 92 FL (ref 79–97)
MICRODELETION SYND BLD/T FISH: NORMAL
MONOCYTES # BLD AUTO: 0.7 X10E3/UL (ref 0.1–0.9)
MONOCYTES NFR BLD AUTO: 8 %
NEUTROPHILS # BLD AUTO: 5.1 X10E3/UL (ref 1.4–7)
NEUTROPHILS NFR BLD AUTO: 63 %
PLATELET # BLD AUTO: 235 X10E3/UL (ref 150–450)
POTASSIUM SERPL-SCNC: 4.1 MMOL/L (ref 3.5–5.2)
POTASSIUM SERPL-SCNC: 4.4 MMOL/L (ref 3.5–5.2)
PROT SERPL-MCNC: 6.7 G/DL (ref 6–8.5)
PROT SERPL-MCNC: 6.7 G/DL (ref 6–8.5)
PROTHROMBIN TIME: 9.8 SEC (ref 9.1–12)
RBC # BLD AUTO: 5.19 X10E6/UL (ref 4.14–5.8)
SL AMB VLDL CHOLESTEROL CALC: 19 MG/DL (ref 5–40)
SODIUM SERPL-SCNC: 138 MMOL/L (ref 134–144)
SODIUM SERPL-SCNC: 138 MMOL/L (ref 134–144)
TRIGL SERPL-MCNC: 103 MG/DL (ref 0–149)
WBC # BLD AUTO: 8 X10E3/UL (ref 3.4–10.8)

## 2022-07-07 ENCOUNTER — LAB (OUTPATIENT)
Dept: LAB | Facility: HOSPITAL | Age: 57
End: 2022-07-07
Payer: COMMERCIAL

## 2022-07-07 ENCOUNTER — APPOINTMENT (OUTPATIENT)
Dept: LAB | Facility: HOSPITAL | Age: 57
End: 2022-07-07
Payer: COMMERCIAL

## 2022-07-07 DIAGNOSIS — M16.11 PRIMARY OSTEOARTHRITIS OF RIGHT HIP: ICD-10-CM

## 2022-07-07 DIAGNOSIS — M16.11 PRIMARY OSTEOARTHRITIS OF ONE HIP, RIGHT: ICD-10-CM

## 2022-07-07 PROCEDURE — 87081 CULTURE SCREEN ONLY: CPT

## 2022-07-07 PROCEDURE — 93005 ELECTROCARDIOGRAM TRACING: CPT

## 2022-07-08 LAB
ATRIAL RATE: 68 BPM
MRSA NOSE QL CULT: NORMAL
P AXIS: 58 DEGREES
PR INTERVAL: 150 MS
QRS AXIS: 92 DEGREES
QRSD INTERVAL: 88 MS
QT INTERVAL: 404 MS
QTC INTERVAL: 429 MS
T WAVE AXIS: 37 DEGREES
VENTRICULAR RATE: 68 BPM

## 2022-07-08 PROCEDURE — 93010 ELECTROCARDIOGRAM REPORT: CPT | Performed by: INTERNAL MEDICINE

## 2022-07-14 ENCOUNTER — OFFICE VISIT (OUTPATIENT)
Dept: FAMILY MEDICINE CLINIC | Facility: CLINIC | Age: 57
End: 2022-07-14
Payer: COMMERCIAL

## 2022-07-14 VITALS
HEIGHT: 69 IN | DIASTOLIC BLOOD PRESSURE: 84 MMHG | RESPIRATION RATE: 16 BRPM | SYSTOLIC BLOOD PRESSURE: 128 MMHG | WEIGHT: 210 LBS | TEMPERATURE: 98 F | OXYGEN SATURATION: 97 % | HEART RATE: 84 BPM | BODY MASS INDEX: 31.1 KG/M2

## 2022-07-14 DIAGNOSIS — E11.9 TYPE 2 DIABETES MELLITUS WITHOUT COMPLICATION, WITHOUT LONG-TERM CURRENT USE OF INSULIN (HCC): ICD-10-CM

## 2022-07-14 DIAGNOSIS — Z01.818 PRE-OP EXAMINATION: Primary | ICD-10-CM

## 2022-07-14 DIAGNOSIS — M16.11 ARTHRITIS OF RIGHT HIP: ICD-10-CM

## 2022-07-14 PROCEDURE — 99215 OFFICE O/P EST HI 40 MIN: CPT | Performed by: NURSE PRACTITIONER

## 2022-07-14 NOTE — LETTER
2022     [facility], DO  [address] [address]    Patient: Johana Khalil   YOB: 1965   Date of Visit: 2022       Dear Dr Valeria Ramos: Thank you for referring Jovita Lopezmichaelraji to me for evaluation  Below are my notes for this consultation  If you have questions, please do not hesitate to call me  I look forward to following your patient along with you  Sincerely,        DAVID Stack        CC: No Recipients  DAVID Stack  2022  8:57 AM  Incomplete  [facility] PRE-OPERATIVE EVALUATION  [facility]    NAME: Johana Khalil  AGE: 64 y o   SEX: male  : 1965     DATE: 2022    [facility] Pre-Operative Evaluation      Chief Complaint: Pre-operative Evaluation     Surgery: ARTHROPLASTY HIP TOTAL ANTERIOR, NAVIGATED (Right Hip)  Anticipated Date of Surgery: 22  Surgeon: Dr Valeria Ramos      History of Present Illness:     HPI    Anesthesia:  general  Bleeding Risk: no recent abnormal bleeding, no remote history of abnormal bleeding and no use of Ca-channel blockers  Current Anti-platelet/anticoagulation medication: none    Assessment of Cardiac Risk:  · Denies unstable or severe angina or MI in the last 6 weeks or history of stent placement in the last year   · Denies decompensated heart failure (e g  New onset heart failure, NYHA functional class IV heart failure, or worsening existing heart failure)  · Denies significant arrhythmias such as high grade AV block, symptomatic ventricular arrhythmia, newly recognized ventricular tachycardia, supraventricular tachycardia with resting heart rate >100, or symptomatic bradycardia  · Denies severe heart valve disease including aortic stenosis or symptomatic mitral stenosis     Exercise Capacity:  · Able to walk 4 blocks without symptoms?: Yes  · Able to walk 2 flights without symptoms?: Yes    Prior Anesthesia Reactions: No     Personal history of venous thromboembolic disease? No    History of steroid use for >2 weeks within last year? No         Review of Systems:     Review of Systems   Constitutional: Negative  Respiratory: Negative  Cardiovascular: Negative  Gastrointestinal: Negative  Endocrine: Negative  Musculoskeletal: Positive for arthralgias  Neurological: Negative  Hematological: Negative  All other systems reviewed and are negative        Current Problem List:     Patient Active Problem List   Diagnosis    Arthralgia of multiple joints    Benign hypertension    Gout    Hyperlipidemia    Lumbar radiculopathy    Hypercalcemia    Chronic uveitis of both eyes    Family history of melanoma    Diabetes mellitus, type 2 ([facility] [address] )       Allergies:     No Known Allergies    Current Medications:       Current Outpatient Medications:     allopurinol (ZYLOPRIM) 300 mg tablet, Take 1 tablet (300 mg total) by mouth daily, Disp: 90 tablet, Rfl: 2    Garlic 691 MG CAPS, , Disp: , Rfl:     lisinopril (ZESTRIL) 2 5 mg tablet, Take 1 tablet (2 5 mg total) by mouth daily, Disp: 90 tablet, Rfl: 2    meloxicam (Mobic) 15 mg tablet, Take 1 tablet (15 mg total) by mouth daily, Disp: 30 tablet, Rfl: 2    Multiple Vitamins-Minerals (multivitamin with minerals) tablet, Take 1 tablet by mouth daily, Disp: 90 tablet, Rfl: 0    simvastatin (ZOCOR) 20 mg tablet, Take 1 tablet (20 mg total) by mouth daily, Disp: 90 tablet, Rfl: 2    ascorbic acid (VITAMIN C) 500 MG TBCR, Take 1 tablet (500 mg total) by mouth 2 (two) times a day, Disp: 60 tablet, Rfl: 0    cholecalciferol (VITAMIN D3) 1,000 units tablet, Take 1 tablet (1,000 Units total) by mouth daily, Disp: 30 tablet, Rfl: 0    ferrous sulfate 324 (65 Fe) mg, Take 1 tablet (324 mg total) by mouth daily before breakfast, Disp: 30 tablet, Rfl: 0    folic acid (FOLVITE) 1 mg tablet, Take 1 tablet (1 mg total) by mouth daily, Disp: 30 tablet, Rfl: 0   zinc sulfate (ZINCATE) 220 mg capsule, Take 1 capsule (220 mg total) by mouth daily, Disp: 30 capsule, Rfl: 0    Past Medical History:       Past Medical History:   Diagnosis Date    Diabetes mellitus ([address] )     type 2 diet controlled    Gout     Hypercholesteremia         Past Surgical History:   Procedure Laterality Date    APPENDECTOMY      COLONOSCOPY N/A 2016    Procedure: COLONOSCOPY;  Surgeon: Elizabeth Solorio MD;  Location: [place] [address] GI LAB; Service:    [facility] INJECTION RIGHT HIP (NON ARTHROGRAM)  3/10/2022    [place] ARTHROCENTESIS ASPIR&/INJ MAJOR JT/BURSA W/O US Right 3/10/2022    Procedure: Hip intra-articular cortisone injection (  [address] ); Surgeon: Arleth Onofre MD;  Location: [facility] MAIN OR;  Service: Pain Management         Family History   Problem Relation Age of Onset    Diabetes Mother             Diabetes Father            Andressa Castañeda Cancer Father     Diabetes Sister     Colon cancer Family         Social History     Socioeconomic History    Marital status: /Civil Union     Spouse name: Not on file    Number of children: Not on file    Years of education: Not on file    Highest education level: Not on file   Occupational History    Not on file   Tobacco Use    Smoking status: Former Smoker     Packs/day: 2 00     Years: 15 00     Pack years: 30 00     Types: Cigarettes     Start date: 1980     Quit date: 2008     Years since quittin 8    Smokeless tobacco: Never Used    Tobacco comment: quit 10 years ago   Vaping Use    Vaping Use: Never used   Substance and Sexual Activity    Alcohol use:  Yes     Alcohol/week: 15 0 standard drinks     Types: 5 Cans of beer, 10 Standard drinks or equivalent per week     Comment: beer two to three times per week Loletta Buckle     Drug use: No    Sexual activity: Yes     Partners: Female     Birth control/protection: Male Sterilization   Other Topics Concern    Not on file   Social History Narrative    Caffeine use Social Determinants of Health     Financial Resource Strain: Not on file   Food Insecurity: Not on file   Transportation Needs: Not on file   Physical Activity: Not on file   Stress: Not on file   Social Connections: Not on file   Intimate Partner Violence: Not on file   Housing Stability: Not on file        Physical Exam:     /90 (BP Location: Left arm, Patient Position: Sitting, Cuff Size: Large)   Pulse 84   Temp 98 °F (36 7 °C)   Resp 16   Ht 5' 9" (1 753 m)   Wt 95 3 kg (210 lb)   SpO2 97%   BMI 31 01 kg/m²     Physical Exam  Vitals and nursing note reviewed  Constitutional:       General: He is not in acute distress  Appearance: Normal appearance  He is well-developed  HENT:      Head: Normocephalic and atraumatic  Eyes:      Conjunctiva/sclera: Conjunctivae normal       Pupils: Pupils are equal, round, and reactive to light  Neck:      Thyroid: No thyromegaly  Vascular: No carotid bruit  Cardiovascular:      Rate and Rhythm: Normal rate and regular rhythm  Pulses: Normal pulses  Heart sounds: Normal heart sounds  No murmur heard  Pulmonary:      Effort: Pulmonary effort is normal       Breath sounds: Normal breath sounds  Abdominal:      General: Bowel sounds are normal  There is no abdominal bruit  Palpations: Abdomen is soft  Tenderness: There is no abdominal tenderness  Musculoskeletal:      Right lower leg: No edema  Left lower leg: No edema  Lymphadenopathy:      Cervical: No cervical adenopathy  Skin:     General: Skin is warm and dry  Coloration: Skin is not pale  Neurological:      General: No focal deficit present  Mental Status: He is alert  Mental status is at baseline     Psychiatric:         Mood and Affect: Mood normal          Behavior: Behavior normal           Data:     Pre-operative work-up    Laboratory Results: I have personally reviewed the pertinent laboratory results/reports      EKG: I have personally reviewed pertinent reports  Recent Results (from the past 672 hour(s))   Lipid panel    Collection Time: 06/29/22 11:33 AM   Result Value Ref Range    Cholesterol, Total 155 100 - 199 mg/dL    Triglycerides 103 0 - 149 mg/dL    HDL 58 >39 mg/dL    VLDL Cholesterol Calculated 19 5 - 40 mg/dL    LDL Calculated 78 0 - 99 mg/dL    T   Chol/HDL Ratio 2 7 0 0 - 5 0 ratio   Comprehensive metabolic panel    Collection Time: 06/29/22 11:33 AM   Result Value Ref Range    Glucose, Random 98 65 - 99 mg/dL    BUN 17 6 - 24 mg/dL    Creatinine 0 86 0 76 - 1 27 mg/dL    eGFR 102 >59 mL/min/1 73    SL AMB BUN/CREATININE RATIO 20 9 - 20    Sodium 138 134 - 144 mmol/L    Potassium 4 1 3 5 - 5 2 mmol/L    Chloride 101 96 - 106 mmol/L    CO2 21 20 - 29 mmol/L    CALCIUM 9 7 8 7 - 10 2 mg/dL    Protein, Total 6 7 6 0 - 8 5 g/dL    Albumin 4 7 3 8 - 4 9 g/dL    Globulin, Total 2 0 1 5 - 4 5 g/dL    Albumin/Globulin Ratio 2 4 (H) 1 2 - 2 2    TOTAL BILIRUBIN 0 8 0 0 - 1 2 mg/dL    Alk Phos Isoenzymes 58 44 - 121 IU/L    AST 23 0 - 40 IU/L    ALT 39 0 - 44 IU/L   Cardiovascular Report    Collection Time: 06/29/22 11:33 AM   Result Value Ref Range    Interpretation Note    Comprehensive metabolic panel    Collection Time: 06/29/22 11:35 AM   Result Value Ref Range    Glucose, Random 100 (H) 65 - 99 mg/dL    BUN 17 6 - 24 mg/dL    Creatinine 0 91 0 76 - 1 27 mg/dL    eGFR 99 >59 mL/min/1 73    SL AMB BUN/CREATININE RATIO 19 9 - 20    Sodium 138 134 - 144 mmol/L    Potassium 4 4 3 5 - 5 2 mmol/L    Chloride 101 96 - 106 mmol/L    CO2 22 20 - 29 mmol/L    CALCIUM 9 8 8 7 - 10 2 mg/dL    Protein, Total 6 7 6 0 - 8 5 g/dL    Albumin 4 7 3 8 - 4 9 g/dL    Globulin, Total 2 0 1 5 - 4 5 g/dL    Albumin/Globulin Ratio 2 4 (H) 1 2 - 2 2    TOTAL BILIRUBIN 0 8 0 0 - 1 2 mg/dL    Alk Phos Isoenzymes 58 44 - 121 IU/L    AST 27 0 - 40 IU/L    ALT 37 0 - 44 IU/L   CBC and differential    Collection Time: 06/29/22 11:35 AM   Result Value Ref Range White Blood Cell Count 8 0 3 4 - 10 8 x10E3/uL    Red Blood Cell Count 5 19 4 14 - 5 80 x10E6/uL    Hemoglobin 15 7 13 0 - 17 7 g/dL    HCT 47 6 37 5 - 51 0 %    MCV 92 79 - 97 fL    MCH 30 3 26 6 - 33 0 pg    MCHC 33 0 31 5 - 35 7 g/dL    RDW 13 1 11 6 - 15 4 %    Platelet Count 221 287 - 450 x10E3/uL    Neutrophils 63 Not Estab  %    Lymphocytes 24 Not Estab  %    Monocytes 8 Not Estab  %    Eosinophils 3 Not Estab  %    Basophils PCT 1 Not Estab  %    Neutrophils (Absolute) 5 1 1 4 - 7 0 x10E3/uL    Lymphocytes (Absolute) 1 9 0 7 - 3 1 x10E3/uL    Monocytes (Absolute) 0 7 0 1 - 0 9 x10E3/uL    Eosinophils (Absolute) 0 2 0 0 - 0 4 x10E3/uL    Basophils ABS 0 1 0 0 - 0 2 x10E3/uL    Immature Granulocytes 1 Not Estab  %    Immature Granulocytes (Absolute) 0 0 0 0 - 0 1 x10E3/uL   Protime-INR    Collection Time: 06/29/22 11:35 AM   Result Value Ref Range    INR 0 9 0 9 - 1 2    Prothrombin Time 9 8 9 1 - 12 0 sec   APTT    Collection Time: 06/29/22 11:35 AM   Result Value Ref Range    aPTT 25 24 - 33 sec   Hemoglobin A1C    Collection Time: 06/29/22 11:35 AM   Result Value Ref Range    Hemoglobin A1C 6 4 (H) 4 8 - 5 6 %    Estimated Average Glucose 137 mg/dL   MRSA culture    Collection Time: 07/07/22 10:14 AM    Specimen: Nares   Result Value Ref Range    MRSA Culture Only       No Methicillin Resistant Staphlyococcus aureus (MRSA) isolated   ECG 12 lead    Collection Time: 07/07/22 10:25 AM   Result Value Ref Range    Ventricular Rate 68 BPM    Atrial Rate 68 BPM    OH Interval 150 ms    QRSD Interval 88 ms    QT Interval 404 ms    QTC Interval 429 ms    P [place] 58 degrees    QRS Axis 92 degrees    T Wave Axis 37 degrees           Assessment & Recommendations:     Problem List Items Addressed This Visit    None     Visit Diagnoses     Pre-op examination    -  Primary    Arthritis of right hip              Pre-Op Evaluation Assessment  64 y o  male with planned surgery: ARTHROPLASTY HIP TOTAL ANTERIOR, NAVIGATED (Right Hip)  Known risk factors for perioperative complications: DM-diet controlled  A1c 6 4  Current medications which may produce withdrawal symptoms if withheld perioperatively: none  Pre-Op Evaluation Plan  1  Further preoperative workup as follows:   - None; no further preoperative work-up is required    2  Medication Management/Recommendations:   - None, continue medication regimen including morning of surgery, with sip of water    3  Prophylaxis for cardiac events with perioperative beta-blockers: not indicated  4  Patient requires further consultation with: None    Clearance  Patient is CLEARED for surgery without any additional cardiac testing  DAVID Lyn  [facility] [address]  Phone#  [phone]  Fax#  [phone] DAVID Adame  2022  8:53 AM  Incomplete  [facility] PRE-OPERATIVE EVALUATION  [facility]    NAME: Monique Espinoza  AGE: 64 y o   SEX: male  : 1965     DATE: 2022    [facility] Pre-Operative Evaluation      Chief Complaint: Pre-operative Evaluation     Surgery: ARTHROPLASTY HIP TOTAL ANTERIOR, NAVIGATED (Right Hip)  Anticipated Date of Surgery: 22  Surgeon: Dr Ana Padron      History of Present Illness:     HPI    Anesthesia:  general  Bleeding Risk: no recent abnormal bleeding, no remote history of abnormal bleeding and no use of Ca-channel blockers  Current Anti-platelet/anticoagulation medication: none    Assessment of Cardiac Risk:  · Denies unstable or severe angina or MI in the last 6 weeks or history of stent placement in the last year   · Denies decompensated heart failure (e g  New onset heart failure, NYHA functional class IV heart failure, or worsening existing heart failure)  · Denies significant arrhythmias such as high grade AV block, symptomatic ventricular arrhythmia, newly recognized ventricular tachycardia, supraventricular tachycardia with resting heart rate >100, or symptomatic bradycardia  · Denies severe heart valve disease including aortic stenosis or symptomatic mitral stenosis     Exercise Capacity:  · Able to walk 4 blocks without symptoms?: Yes  · Able to walk 2 flights without symptoms?: Yes    Prior Anesthesia Reactions: No     Personal history of venous thromboembolic disease? No    History of steroid use for >2 weeks within last year?  No         Review of Systems:     Review of Systems    Current Problem List:     Patient Active Problem List   Diagnosis    Arthralgia of multiple joints    Benign hypertension    Gout    Hyperlipidemia    Lumbar radiculopathy    Hypercalcemia    Chronic uveitis of both eyes    Family history of melanoma    Diabetes mellitus, type 2 ([facility] [address] )       Allergies:     No Known Allergies    Current Medications:       Current Outpatient Medications:     allopurinol (ZYLOPRIM) 300 mg tablet, Take 1 tablet (300 mg total) by mouth daily, Disp: 90 tablet, Rfl: 2    Garlic 191 MG CAPS, , Disp: , Rfl:     lisinopril (ZESTRIL) 2 5 mg tablet, Take 1 tablet (2 5 mg total) by mouth daily, Disp: 90 tablet, Rfl: 2    meloxicam (Mobic) 15 mg tablet, Take 1 tablet (15 mg total) by mouth daily, Disp: 30 tablet, Rfl: 2    Multiple Vitamins-Minerals (multivitamin with minerals) tablet, Take 1 tablet by mouth daily, Disp: 90 tablet, Rfl: 0    simvastatin (ZOCOR) 20 mg tablet, Take 1 tablet (20 mg total) by mouth daily, Disp: 90 tablet, Rfl: 2    ascorbic acid (VITAMIN C) 500 MG TBCR, Take 1 tablet (500 mg total) by mouth 2 (two) times a day, Disp: 60 tablet, Rfl: 0    cholecalciferol (VITAMIN D3) 1,000 units tablet, Take 1 tablet (1,000 Units total) by mouth daily, Disp: 30 tablet, Rfl: 0    ferrous sulfate 324 (65 Fe) mg, Take 1 tablet (324 mg total) by mouth daily before breakfast, Disp: 30 tablet, Rfl: 0    folic acid (FOLVITE) 1 mg tablet, Take 1 tablet (1 mg total) by mouth daily, Disp: 30 tablet, Rfl: 0    zinc sulfate (ZINCATE) 220 mg capsule, Take 1 capsule (220 mg total) by mouth daily, Disp: 30 capsule, Rfl: 0    Past Medical History:       Past Medical History:   Diagnosis Date    Diabetes mellitus ([address] )     type 2 diet controlled    Gout     Hypercholesteremia         Past Surgical History:   Procedure Laterality Date    APPENDECTOMY      COLONOSCOPY N/A 2016    Procedure: COLONOSCOPY;  Surgeon: Jose Antonio Carl MD;  Location: [place] [address] GI LAB; Service:    [facility] INJECTION RIGHT HIP (NON ARTHROGRAM)  3/10/2022    IN ARTHROCENTESIS ASPIR&/INJ MAJOR JT/BURSA W/O US Right 3/10/2022    Procedure: Hip intra-articular cortisone injection ( [id number] ); Surgeon: Ritika Langley MD;  Location: [facility] MAIN OR;  Service: Pain Management         Family History   Problem Relation Age of Onset    Diabetes Mother             Diabetes Father            Moon Lama Cancer Father     Diabetes Sister     Colon cancer Family         Social History     Socioeconomic History    Marital status: /Civil Union     Spouse name: Not on file    Number of children: Not on file    Years of education: Not on file    Highest education level: Not on file   Occupational History    Not on file   Tobacco Use    Smoking status: Former Smoker     Packs/day: 2 00     Years: 15 00     Pack years: 30 00     Types: Cigarettes     Start date: 1980     Quit date: 2008     Years since quittin 8    Smokeless tobacco: Never Used    Tobacco comment: quit 10 years ago   Vaping Use    Vaping Use: Never used   Substance and Sexual Activity    Alcohol use:  Yes     Alcohol/week: 15 0 standard drinks     Types: 5 Cans of beer, 10 Standard drinks or equivalent per week     Comment: beer two to three times per week Philip Brannon     Drug use: No    Sexual activity: Yes     Partners: Female     Birth control/protection: Male Sterilization   Other Topics Concern    Not on file Social History Narrative    Caffeine use      Social Determinants of Health     Financial Resource Strain: Not on file   Food Insecurity: Not on file   Transportation Needs: Not on file   Physical Activity: Not on file   Stress: Not on file   Social Connections: Not on file   Intimate Partner Violence: Not on file   Housing Stability: Not on file        Physical Exam:     /90 (BP Location: Left arm, Patient Position: Sitting, Cuff Size: Large)   Pulse 84   Temp 98 °F (36 7 °C)   Resp 16   Ht 5' 9" (1 753 m)   Wt 95 3 kg (210 lb)   SpO2 97%   BMI 31 01 kg/m²     Physical Exam     Data:     Pre-operative work-up    Laboratory Results: I have personally reviewed the pertinent laboratory results/reports      EKG: I have personally reviewed pertinent reports  Recent Results (from the past 672 hour(s))   Lipid panel    Collection Time: 06/29/22 11:33 AM   Result Value Ref Range    Cholesterol, Total 155 100 - 199 mg/dL    Triglycerides 103 0 - 149 mg/dL    HDL 58 >39 mg/dL    VLDL Cholesterol Calculated 19 5 - 40 mg/dL    LDL Calculated 78 0 - 99 mg/dL    T   Chol/HDL Ratio 2 7 0 0 - 5 0 ratio   Comprehensive metabolic panel    Collection Time: 06/29/22 11:33 AM   Result Value Ref Range    Glucose, Random 98 65 - 99 mg/dL    BUN 17 6 - 24 mg/dL    Creatinine 0 86 0 76 - 1 27 mg/dL    eGFR 102 >59 mL/min/1 73    SL AMB BUN/CREATININE RATIO 20 9 - 20    Sodium 138 134 - 144 mmol/L    Potassium 4 1 3 5 - 5 2 mmol/L    Chloride 101 96 - 106 mmol/L    CO2 21 20 - 29 mmol/L    CALCIUM 9 7 8 7 - 10 2 mg/dL    Protein, Total 6 7 6 0 - 8 5 g/dL    Albumin 4 7 3 8 - 4 9 g/dL    Globulin, Total 2 0 1 5 - 4 5 g/dL    Albumin/Globulin Ratio 2 4 (H) 1 2 - 2 2    TOTAL BILIRUBIN 0 8 0 0 - 1 2 mg/dL    Alk Phos Isoenzymes 58 44 - 121 IU/L    AST 23 0 - 40 IU/L    ALT 39 0 - 44 IU/L   Cardiovascular Report    Collection Time: 06/29/22 11:33 AM   Result Value Ref Range    Interpretation Note    Comprehensive metabolic panel    Collection Time: 06/29/22 11:35 AM   Result Value Ref Range    Glucose, Random 100 (H) 65 - 99 mg/dL    BUN 17 6 - 24 mg/dL    Creatinine 0 91 0 76 - 1 27 mg/dL    eGFR 99 >59 mL/min/1 73    SL AMB BUN/CREATININE RATIO 19 9 - 20    Sodium 138 134 - 144 mmol/L    Potassium 4 4 3 5 - 5 2 mmol/L    Chloride 101 96 - 106 mmol/L    CO2 22 20 - 29 mmol/L    CALCIUM 9 8 8 7 - 10 2 mg/dL    Protein, Total 6 7 6 0 - 8 5 g/dL    Albumin 4 7 3 8 - 4 9 g/dL    Globulin, Total 2 0 1 5 - 4 5 g/dL    Albumin/Globulin Ratio 2 4 (H) 1 2 - 2 2    TOTAL BILIRUBIN 0 8 0 0 - 1 2 mg/dL    Alk Phos Isoenzymes 58 44 - 121 IU/L    AST 27 0 - 40 IU/L    ALT 37 0 - 44 IU/L   CBC and differential    Collection Time: 06/29/22 11:35 AM   Result Value Ref Range    White Blood Cell Count 8 0 3 4 - 10 8 x10E3/uL    Red Blood Cell Count 5 19 4 14 - 5 80 x10E6/uL    Hemoglobin 15 7 13 0 - 17 7 g/dL    HCT 47 6 37 5 - 51 0 %    MCV 92 79 - 97 fL    MCH 30 3 26 6 - 33 0 pg    MCHC 33 0 31 5 - 35 7 g/dL    RDW 13 1 11 6 - 15 4 %    Platelet Count 891 683 - 450 x10E3/uL    Neutrophils 63 Not Estab  %    Lymphocytes 24 Not Estab  %    Monocytes 8 Not Estab  %    Eosinophils 3 Not Estab  %    Basophils PCT 1 Not Estab  %    Neutrophils (Absolute) 5 1 1 4 - 7 0 x10E3/uL    Lymphocytes (Absolute) 1 9 0 7 - 3 1 x10E3/uL    Monocytes (Absolute) 0 7 0 1 - 0 9 x10E3/uL    Eosinophils (Absolute) 0 2 0 0 - 0 4 x10E3/uL    Basophils ABS 0 1 0 0 - 0 2 x10E3/uL    Immature Granulocytes 1 Not Estab  %    Immature Granulocytes (Absolute) 0 0 0 0 - 0 1 x10E3/uL   Protime-INR    Collection Time: 06/29/22 11:35 AM   Result Value Ref Range    INR 0 9 0 9 - 1 2    Prothrombin Time 9 8 9 1 - 12 0 sec   APTT    Collection Time: 06/29/22 11:35 AM   Result Value Ref Range    aPTT 25 24 - 33 sec   Hemoglobin A1C    Collection Time: 06/29/22 11:35 AM   Result Value Ref Range    Hemoglobin A1C 6 4 (H) 4 8 - 5 6 %    Estimated Average Glucose 137 mg/dL   MRSA culture Collection Time: 07/07/22 10:14 AM    Specimen: Nares   Result Value Ref Range    MRSA Culture Only       No Methicillin Resistant Staphlyococcus aureus (MRSA) isolated   ECG 12 lead    Collection Time: 07/07/22 10:25 AM   Result Value Ref Range    Ventricular Rate 68 BPM    Atrial Rate 68 BPM    MO Interval 150 ms    QRSD Interval 88 ms    QT Interval 404 ms    QTC Interval 429 ms    P [place] 58 degrees    QRS Axis 92 degrees    T Wave Axis 37 degrees           Assessment & Recommendations:     Problem List Items Addressed This Visit    None     Visit Diagnoses     Pre-op examination    -  Primary    Arthritis of right hip              Pre-Op Evaluation Assessment  64 y o  male with planned surgery: ARTHROPLASTY HIP TOTAL ANTERIOR, NAVIGATED (Right Hip)  Known risk factors for perioperative complications: DM-diet controlled  A1c 6 4  Current medications which may produce withdrawal symptoms if withheld perioperatively: none  Pre-Op Evaluation Plan  1  Further preoperative workup as follows:   - None; no further preoperative work-up is required    2  Medication Management/Recommendations:   - None, continue medication regimen including morning of surgery, with sip of water    3  Prophylaxis for cardiac events with perioperative beta-blockers: not indicated  4  Patient requires further consultation with: None    Clearance  Patient is CLEARED for surgery without any additional cardiac testing       Allison Venegas, [address] [place] [address] [facility] [address]  Phone#  [phone]  Fax#  [phone]

## 2022-07-14 NOTE — PROGRESS NOTES
[facility] PRE-OPERATIVE EVALUATION  [facility] PHYSICIAN GROUP [facility]    NAME: Balbina Marina  AGE: 64 y o  SEX: male  : 1965     DATE: 2022    [facility] Pre-Operative Evaluation      Chief Complaint: Pre-operative Evaluation     Surgery: ARTHROPLASTY HIP TOTAL ANTERIOR, NAVIGATED (Right Hip)  Anticipated Date of Surgery: 22  Surgeon: Dr Rosa Deleon      History of Present Illness:     HPI    Anesthesia:  general  Bleeding Risk: no recent abnormal bleeding, no remote history of abnormal bleeding and no use of Ca-channel blockers  Current Anti-platelet/anticoagulation medication: none    Assessment of Cardiac Risk:  · Denies unstable or severe angina or MI in the last 6 weeks or history of stent placement in the last year   · Denies decompensated heart failure (e g  New onset heart failure, NYHA functional class IV heart failure, or worsening existing heart failure)  · Denies significant arrhythmias such as high grade AV block, symptomatic ventricular arrhythmia, newly recognized ventricular tachycardia, supraventricular tachycardia with resting heart rate >100, or symptomatic bradycardia  · Denies severe heart valve disease including aortic stenosis or symptomatic mitral stenosis     Exercise Capacity:  · Able to walk 4 blocks without symptoms?: Yes  · Able to walk 2 flights without symptoms?: Yes    Prior Anesthesia Reactions: No     Personal history of venous thromboembolic disease? No    History of steroid use for >2 weeks within last year? No         Review of Systems:     Review of Systems   Constitutional: Negative  Respiratory: Negative  Cardiovascular: Negative  Gastrointestinal: Negative  Endocrine: Negative  Musculoskeletal: Positive for arthralgias  Neurological: Negative  Hematological: Negative  All other systems reviewed and are negative        Current Problem List:     Patient Active Problem List   Diagnosis    Arthralgia of multiple joints    Benign hypertension    Gout    Hyperlipidemia    Lumbar radiculopathy    Hypercalcemia    Chronic uveitis of both eyes    Family history of melanoma    Diabetes mellitus, type 2 (HCC)       Allergies:     No Known Allergies    Current Medications:       Current Outpatient Medications:     allopurinol (ZYLOPRIM) 300 mg tablet, Take 1 tablet (300 mg total) by mouth daily, Disp: 90 tablet, Rfl: 2    Garlic 917 MG CAPS, , Disp: , Rfl:     lisinopril (ZESTRIL) 2 5 mg tablet, Take 1 tablet (2 5 mg total) by mouth daily, Disp: 90 tablet, Rfl: 2    meloxicam (Mobic) 15 mg tablet, Take 1 tablet (15 mg total) by mouth daily, Disp: 30 tablet, Rfl: 2    Multiple Vitamins-Minerals (multivitamin with minerals) tablet, Take 1 tablet by mouth daily, Disp: 90 tablet, Rfl: 0    simvastatin (ZOCOR) 20 mg tablet, Take 1 tablet (20 mg total) by mouth daily, Disp: 90 tablet, Rfl: 2    ascorbic acid (VITAMIN C) 500 MG TBCR, Take 1 tablet (500 mg total) by mouth 2 (two) times a day, Disp: 60 tablet, Rfl: 0    cholecalciferol (VITAMIN D3) 1,000 units tablet, Take 1 tablet (1,000 Units total) by mouth daily, Disp: 30 tablet, Rfl: 0    ferrous sulfate 324 (65 Fe) mg, Take 1 tablet (324 mg total) by mouth daily before breakfast, Disp: 30 tablet, Rfl: 0    folic acid (FOLVITE) 1 mg tablet, Take 1 tablet (1 mg total) by mouth daily, Disp: 30 tablet, Rfl: 0    zinc sulfate (ZINCATE) 220 mg capsule, Take 1 capsule (220 mg total) by mouth daily, Disp: 30 capsule, Rfl: 0    Past Medical History:       Past Medical History:   Diagnosis Date    Diabetes mellitus ([address] )     type 2 diet controlled    Gout     Hypercholesteremia         Past Surgical History:   Procedure Laterality Date    APPENDECTOMY      COLONOSCOPY N/A 8/31/2016    Procedure: COLONOSCOPY;  Surgeon: Ross Jacques MD;  Location: [place] [address] GI LAB;   Service:    [facility] INJECTION RIGHT HIP (NON ARTHROGRAM)  3/10/2022    MO ARTHROCENTESIS ASPIR&/INJ MAJOR JT/BURSA W/O US Right 3/10/2022    Procedure: Hip intra-articular cortisone injection ( [id number] ); Surgeon: Sri Ramírez MD;  Location: [facility] MAIN OR;  Service: Pain Management         Family History   Problem Relation Age of Onset    Diabetes Mother             Diabetes Father            Lazara Jones Cancer Father     Diabetes Sister     Colon cancer Family         Social History     Socioeconomic History    Marital status: /Civil Union     Spouse name: Not on file    Number of children: Not on file    Years of education: Not on file    Highest education level: Not on file   Occupational History    Not on file   Tobacco Use    Smoking status: Former Smoker     Packs/day: 2 00     Years: 15 00     Pack years: 30 00     Types: Cigarettes     Start date: 1980     Quit date: 2008     Years since quittin 8    Smokeless tobacco: Never Used    Tobacco comment: quit 10 years ago   Vaping Use    Vaping Use: Never used   Substance and Sexual Activity    Alcohol use:  Yes     Alcohol/week: 15 0 standard drinks     Types: 5 Cans of beer, 10 Standard drinks or equivalent per week     Comment: beer two to three times per week Josephinetaina Arreaga     Drug use: No    Sexual activity: Yes     Partners: Female     Birth control/protection: Male Sterilization   Other Topics Concern    Not on file   Social History Narrative    Caffeine use      Social Determinants of Health     Financial Resource Strain: Not on file   Food Insecurity: Not on file   Transportation Needs: Not on file   Physical Activity: Not on file   Stress: Not on file   Social Connections: Not on file   Intimate Partner Violence: Not on file   Housing Stability: Not on file        Physical Exam:     /90 (BP Location: Left arm, Patient Position: Sitting, Cuff Size: Large)   Pulse 84   Temp 98 °F (36 7 °C)   Resp 16   Ht 5' 9" (1 753 m)   Wt 95 3 kg (210 lb)   SpO2 97%   BMI 31 01 kg/m²     Physical Exam  Vitals and nursing note reviewed  Constitutional:       General: He is not in acute distress  Appearance: Normal appearance  He is well-developed  HENT:      Head: Normocephalic and atraumatic  Eyes:      Conjunctiva/sclera: Conjunctivae normal       Pupils: Pupils are equal, round, and reactive to light  Neck:      Thyroid: No thyromegaly  Vascular: No carotid bruit  Cardiovascular:      Rate and Rhythm: Normal rate and regular rhythm  Pulses: Normal pulses  Heart sounds: Normal heart sounds  No murmur heard  Pulmonary:      Effort: Pulmonary effort is normal       Breath sounds: Normal breath sounds  Abdominal:      General: Bowel sounds are normal  There is no abdominal bruit  Palpations: Abdomen is soft  Tenderness: There is no abdominal tenderness  Musculoskeletal:      Right lower leg: No edema  Left lower leg: No edema  Lymphadenopathy:      Cervical: No cervical adenopathy  Skin:     General: Skin is warm and dry  Coloration: Skin is not pale  Neurological:      General: No focal deficit present  Mental Status: He is alert  Mental status is at baseline  Psychiatric:         Mood and Affect: Mood normal          Behavior: Behavior normal           Data:     Pre-operative work-up    Laboratory Results: I have personally reviewed the pertinent laboratory results/reports      EKG: I have personally reviewed pertinent reports  Recent Results (from the past 672 hour(s))   Lipid panel    Collection Time: 06/29/22 11:33 AM   Result Value Ref Range    Cholesterol, Total 155 100 - 199 mg/dL    Triglycerides 103 0 - 149 mg/dL    HDL 58 >39 mg/dL    VLDL Cholesterol Calculated 19 5 - 40 mg/dL    LDL Calculated 78 0 - 99 mg/dL    T   Chol/HDL Ratio 2 7 0 0 - 5 0 ratio   Comprehensive metabolic panel    Collection Time: 06/29/22 11:33 AM   Result Value Ref Range    Glucose, Random 98 65 - 99 mg/dL    BUN 17 6 - 24 mg/dL    Creatinine 0 86 0 76 - 1 27 mg/dL    eGFR 102 >59 mL/min/1 73    SL AMB BUN/CREATININE RATIO 20 9 - 20    Sodium 138 134 - 144 mmol/L    Potassium 4 1 3 5 - 5 2 mmol/L    Chloride 101 96 - 106 mmol/L    CO2 21 20 - 29 mmol/L    CALCIUM 9 7 8 7 - 10 2 mg/dL    Protein, Total 6 7 6 0 - 8 5 g/dL    Albumin 4 7 3 8 - 4 9 g/dL    Globulin, Total 2 0 1 5 - 4 5 g/dL    Albumin/Globulin Ratio 2 4 (H) 1 2 - 2 2    TOTAL BILIRUBIN 0 8 0 0 - 1 2 mg/dL    Alk Phos Isoenzymes 58 44 - 121 IU/L    AST 23 0 - 40 IU/L    ALT 39 0 - 44 IU/L   Cardiovascular Report    Collection Time: 06/29/22 11:33 AM   Result Value Ref Range    Interpretation Note    Comprehensive metabolic panel    Collection Time: 06/29/22 11:35 AM   Result Value Ref Range    Glucose, Random 100 (H) 65 - 99 mg/dL    BUN 17 6 - 24 mg/dL    Creatinine 0 91 0 76 - 1 27 mg/dL    eGFR 99 >59 mL/min/1 73    SL AMB BUN/CREATININE RATIO 19 9 - 20    Sodium 138 134 - 144 mmol/L    Potassium 4 4 3 5 - 5 2 mmol/L    Chloride 101 96 - 106 mmol/L    CO2 22 20 - 29 mmol/L    CALCIUM 9 8 8 7 - 10 2 mg/dL    Protein, Total 6 7 6 0 - 8 5 g/dL    Albumin 4 7 3 8 - 4 9 g/dL    Globulin, Total 2 0 1 5 - 4 5 g/dL    Albumin/Globulin Ratio 2 4 (H) 1 2 - 2 2    TOTAL BILIRUBIN 0 8 0 0 - 1 2 mg/dL    Alk Phos Isoenzymes 58 44 - 121 IU/L    AST 27 0 - 40 IU/L    ALT 37 0 - 44 IU/L   CBC and differential    Collection Time: 06/29/22 11:35 AM   Result Value Ref Range    White Blood Cell Count 8 0 3 4 - 10 8 x10E3/uL    Red Blood Cell Count 5 19 4 14 - 5 80 x10E6/uL    Hemoglobin 15 7 13 0 - 17 7 g/dL    HCT 47 6 37 5 - 51 0 %    MCV 92 79 - 97 fL    MCH 30 3 26 6 - 33 0 pg    MCHC 33 0 31 5 - 35 7 g/dL    RDW 13 1 11 6 - 15 4 %    Platelet Count 074 058 - 450 x10E3/uL    Neutrophils 63 Not Estab  %    Lymphocytes 24 Not Estab  %    Monocytes 8 Not Estab  %    Eosinophils 3 Not Estab  %    Basophils PCT 1 Not Estab  %    Neutrophils (Absolute) 5 1 1 4 - 7 0 x10E3/uL    Lymphocytes (Absolute) 1 9 0 7 - 3 1 x10E3/uL Monocytes (Absolute) 0 7 0 1 - 0 9 x10E3/uL    Eosinophils (Absolute) 0 2 0 0 - 0 4 x10E3/uL    Basophils ABS 0 1 0 0 - 0 2 x10E3/uL    Immature Granulocytes 1 Not Estab  %    Immature Granulocytes (Absolute) 0 0 0 0 - 0 1 x10E3/uL   Protime-INR    Collection Time: 06/29/22 11:35 AM   Result Value Ref Range    INR 0 9 0 9 - 1 2    Prothrombin Time 9 8 9 1 - 12 0 sec   APTT    Collection Time: 06/29/22 11:35 AM   Result Value Ref Range    aPTT 25 24 - 33 sec   Hemoglobin A1C    Collection Time: 06/29/22 11:35 AM   Result Value Ref Range    Hemoglobin A1C 6 4 (H) 4 8 - 5 6 %    Estimated Average Glucose 137 mg/dL   MRSA culture    Collection Time: 07/07/22 10:14 AM    Specimen: Nares   Result Value Ref Range    MRSA Culture Only       No Methicillin Resistant Staphlyococcus aureus (MRSA) isolated   ECG 12 lead    Collection Time: 07/07/22 10:25 AM   Result Value Ref Range    Ventricular Rate 68 BPM    Atrial Rate 68 BPM    IA Interval 150 ms    QRSD Interval 88 ms    QT Interval 404 ms    QTC Interval 429 ms    P Baldwin 58 degrees    QRS Axis 92 degrees    T Wave Axis 37 degrees           Assessment & Recommendations:     Problem List Items Addressed This Visit    None     Visit Diagnoses     Pre-op examination    -  Primary    Arthritis of right hip              Pre-Op Evaluation Assessment  64 y o  male with planned surgery: ARTHROPLASTY HIP TOTAL ANTERIOR, NAVIGATED (Right Hip)  Known risk factors for perioperative complications: DM-diet controlled  A1c 6 4  Current medications which may produce withdrawal symptoms if withheld perioperatively: none  Pre-Op Evaluation Plan  1  Further preoperative workup as follows:   - None; no further preoperative work-up is required    2  Medication Management/Recommendations:   - None, continue medication regimen including morning of surgery, with sip of water    3  Prophylaxis for cardiac events with perioperative beta-blockers: not indicated      4  Patient requires further consultation with: None    Clearance  Patient is CLEARED for surgery without any additional cardiac testing       Pura Mendiola, [facility]  [address] [address]  Phone#  [phone]  Fax#  [phone]

## 2022-07-19 ENCOUNTER — EVALUATION (OUTPATIENT)
Dept: PHYSICAL THERAPY | Facility: CLINIC | Age: 57
End: 2022-07-19
Payer: COMMERCIAL

## 2022-07-19 DIAGNOSIS — M16.11 PRIMARY OSTEOARTHRITIS OF RIGHT HIP: Primary | ICD-10-CM

## 2022-07-19 PROCEDURE — 97161 PT EVAL LOW COMPLEX 20 MIN: CPT | Performed by: PHYSICAL THERAPIST

## 2022-07-19 NOTE — PROGRESS NOTES
PT Evaluation     Today's date: 2022  Patient name: Michaelle Diamond  : 1965  MRN: [id number]  Referring provider: Ashwin Villalobos DO  Dx:   Encounter Diagnosis     ICD-10-CM    1  Primary osteoarthritis of right hip  M16 11                   Assessment  Assessment details: Gabriel Vela Barendspresents with signs and symptoms consistent with Primary osteoarthritis of right hip  (primary encounter diagnosis), with loss of range of motion, strength and spinal stabilization  Presents with high reactivity  Michaelle Diamond would benefit with physical therapy to address these impairments to return to prior level of function  Patient educated to prepare home for post-THR, demonstrated understanding of hip precautions, ambulation with RW, safe car/toilet transfers  Impairments: abnormal gait, abnormal or restricted ROM, activity intolerance, impaired balance, impaired physical strength, lacks appropriate home exercise program, pain with function and weight-bearing intolerance    Goals  STG  Initiate HEP  Able to safely manage Post-THR at home in 1 week  LTG  Independent with HEP  Transition to outpatient PT,   Restore normal gait pattern  Restore normal balance, strength      Plan  Planned therapy interventions: neuromuscular re-education, balance, balance/weight bearing training, patient education, strengthening, stretching, therapeutic exercise, gait training and home exercise program  Frequency: 3x week  Duration in visits: 12  Duration in weeks: 4  Treatment plan discussed with: patient        Subjective Evaluation    History of Present Illness  Mechanism of injury: Patient reports progressive pain limited walking due to right hip OA, to have a THR on 22            Recurrent probem    Quality of life: good    Pain  Current pain ratin  At best pain ratin  At worst pain ratin  Location: right hip  Quality: needle-like and knife-like  Aggravating factors: stair climbing, walking and standing  Progression: worsening    Treatments  Current treatment: physical therapy  Patient Goals  Patient goals for therapy: decreased pain, decreased edema, improved balance, increased strength, increased motion, independence with ADLs/IADLs, return to sport/leisure activities and return to work          Objective     Functional Assessment        Comments  TUG  16 sec             Precautions: THR 7/26/22      Manuals                                                                 Neuro Re-Ed                                                                                                        Ther Ex                                                                                                                     Ther Activity                                       Gait Training                                       Modalities

## 2022-07-19 NOTE — PRE-PROCEDURE INSTRUCTIONS
My Surgical Experience    The following information was developed to assist you to prepare for your operation  What do I need to do before coming to the hospital?   Arrange for a responsible person to drive you to and from the hospital    Arrange care for your children at home  Children are not allowed in the recovery areas of the hospital   Plan to wear clothing that is easy to put on and take off  If you are having shoulder surgery, wear a shirt that buttons or zippers in the front  Bathing  o Shower the evening before and the morning of your surgery with an antibacterial soap  Please refer to the Pre Op Showering Instructions for Surgery Patients Sheet   o Remove nail polish and all body piercing jewelry  o Do not shave any body part for at least 24 hours before surgery-this includes face, arms, legs and upper body  Food  o Nothing to eat or drink after midnight the night before your surgery  This includes candy and chewing gum  o Exception: If your surgery is after 12:00pm (noon), you may have clear liquids such as 7-Up®, ginger ale, apple or cranberry juice, Jell-O®, water, or clear broth until 8:00 am  o Do not drink milk or juice with pulp on the morning before surgery  o Do not drink alcohol 24 hours before surgery  Medicine  o Follow instructions you received from your surgeon about which medicines you may take on the day of surgery  o If instructed to take medicine on the morning of surgery, take pills with just a small sip of water  Call your prescribing doctor for specific infroamtion on what to do if you take insulin    What should I bring to the hospital?    Bring:  Poli Esposito or a walker, if you have them, for foot or knee surgery   A list of the daily medicines, vitamins, minerals, herbals and nutritional supplements you take   Include the dosages of medicines and the time you take them each day   Glasses, dentures or hearing aids   Minimal clothing; you will be wearing hospital sleepwear   Photo ID; required to verify your identity   If you have a Living Will or Power of , bring a copy of the documents   If you have an ostomy, bring an extra pouch and any supplies you use    Do not bring   Medicines or inhalers   Money, valuables or jewelry    What other information should I know about the day of surgery?  Notify your surgeons if you develop a cold, sore throat, cough, fever, rash or any other illness   Report to the Ambulatory Surgical/Same Day Surgery Unit   You will be instructed to stop at Registration only if you have not been pre-registered   Inform your  fi they do not stay that they will be asked by the staff to leave a phone number where they can be reached   Be available to be reached before surgery  In the event the operating room schedule changes, you may be asked to come in earlier or later than expected    *It is important to tell your doctor and others involved in your health care if you are taking or have been taking any non-prescription drugs, vitamins, minerals, herbals or other nutritional supplements  Any of these may interact with some food or medicines and cause a reaction      Pre-Surgery Instructions:   Medication Instructions    allopurinol (ZYLOPRIM) 300 mg tablet Hold day of surgery   ascorbic acid (VITAMIN C) 500 MG TBCR Hold day of surgery   cholecalciferol (VITAMIN D3) 1,000 units tablet Hold day of surgery   ferrous sulfate 324 (65 Fe) mg Hold day of surgery   folic acid (FOLVITE) 1 mg tablet Hold day of surgery   Garlic 012 MG CAPS Hold day of surgery   lisinopril (ZESTRIL) 2 5 mg tablet Hold day of surgery   meloxicam (Mobic) 15 mg tablet Stop taking 5 days prior to surgery   Multiple Vitamins-Minerals (multivitamin with minerals) tablet Hold day of surgery   simvastatin (ZOCOR) 20 mg tablet Hold day of surgery   zinc sulfate (ZINCATE) 220 mg capsule Hold day of surgery

## 2022-07-25 ENCOUNTER — ANESTHESIA EVENT (OUTPATIENT)
Dept: PERIOP | Facility: HOSPITAL | Age: 57
End: 2022-07-25
Payer: COMMERCIAL

## 2022-07-25 NOTE — ANESTHESIA PREPROCEDURE EVALUATION
Procedure:  ARTHROPLASTY HIP TOTAL ANTERIOR, NAVIGATED (Right Hip)    Relevant Problems   CARDIO   (+) Benign hypertension   (+) Hyperlipidemia      ENDO   (+) Diabetes mellitus, type 2 (HCC)      MUSCULOSKELETAL   (+) Gout        Physical Exam    Airway    Mallampati score: II  TM Distance: >3 FB  Neck ROM: full     Dental   No notable dental hx     Cardiovascular  Cardiovascular exam normal    Pulmonary  Pulmonary exam normal     Other Findings        Anesthesia Plan  ASA Score- 3     Anesthesia Type- spinal with ASA Monitors  Additional Monitors:   Airway Plan:           Plan Factors-Exercise tolerance (METS): >4 METS  Chart reviewed  Imaging results reviewed  Existing labs reviewed  Patient summary reviewed  Patient is not a current smoker  Induction-     Postoperative Plan-     Informed Consent- Anesthetic plan and risks discussed with patient  I personally reviewed this patient with the CRNA  Discussed and agreed on the Anesthesia Plan with the CRNA  Octavio Stinson

## 2022-07-26 ENCOUNTER — HOSPITAL ENCOUNTER (OUTPATIENT)
Facility: HOSPITAL | Age: 57
Setting detail: OUTPATIENT SURGERY
Discharge: HOME | End: 2022-07-26
Attending: ORTHOPAEDIC SURGERY | Admitting: ORTHOPAEDIC SURGERY
Payer: COMMERCIAL

## 2022-07-26 ENCOUNTER — ANESTHESIA (OUTPATIENT)
Dept: PERIOP | Facility: HOSPITAL | Age: 57
End: 2022-07-26
Payer: COMMERCIAL

## 2022-07-26 ENCOUNTER — APPOINTMENT (OUTPATIENT)
Dept: RADIOLOGY | Facility: HOSPITAL | Age: 57
End: 2022-07-26
Payer: COMMERCIAL

## 2022-07-26 VITALS
DIASTOLIC BLOOD PRESSURE: 74 MMHG | HEIGHT: 69 IN | SYSTOLIC BLOOD PRESSURE: 139 MMHG | HEART RATE: 83 BPM | BODY MASS INDEX: 31.43 KG/M2 | TEMPERATURE: 96.6 F | OXYGEN SATURATION: 95 % | RESPIRATION RATE: 18 BRPM | WEIGHT: 212.2 LBS

## 2022-07-26 DIAGNOSIS — M19.90 OSTEOARTHRITIS: ICD-10-CM

## 2022-07-26 DIAGNOSIS — Z96.641 STATUS POST TOTAL REPLACEMENT OF RIGHT HIP: Primary | ICD-10-CM

## 2022-07-26 PROCEDURE — C1776 JOINT DEVICE (IMPLANTABLE): HCPCS | Performed by: ORTHOPAEDIC SURGERY

## 2022-07-26 PROCEDURE — 97116 GAIT TRAINING THERAPY: CPT

## 2022-07-26 PROCEDURE — 73501 X-RAY EXAM HIP UNI 1 VIEW: CPT

## 2022-07-26 PROCEDURE — 99024 POSTOP FOLLOW-UP VISIT: CPT | Performed by: ORTHOPAEDIC SURGERY

## 2022-07-26 PROCEDURE — 97167 OT EVAL HIGH COMPLEX 60 MIN: CPT

## 2022-07-26 PROCEDURE — 97535 SELF CARE MNGMENT TRAINING: CPT

## 2022-07-26 PROCEDURE — 27130 TOTAL HIP ARTHROPLASTY: CPT | Performed by: ORTHOPAEDIC SURGERY

## 2022-07-26 PROCEDURE — 97162 PT EVAL MOD COMPLEX 30 MIN: CPT

## 2022-07-26 PROCEDURE — C9290 INJ, BUPIVACAINE LIPOSOME: HCPCS | Performed by: ANESTHESIOLOGY

## 2022-07-26 PROCEDURE — 0054T BONE SRGRY CMPTR FLUOR IMAGE: CPT | Performed by: ORTHOPAEDIC SURGERY

## 2022-07-26 PROCEDURE — 27130 TOTAL HIP ARTHROPLASTY: CPT | Performed by: PHYSICIAN ASSISTANT

## 2022-07-26 DEVICE — PINNACLE HIP SOLUTIONS ALTRX POLYETHYLENE ACETABULAR LINER NEUTRAL 36MM ID 56MM OD
Type: IMPLANTABLE DEVICE | Site: HIP | Status: FUNCTIONAL
Brand: PINNACLE ALTRX

## 2022-07-26 DEVICE — APEX HOLE ELIMINATOR - PS
Type: IMPLANTABLE DEVICE | Site: HIP | Status: FUNCTIONAL
Brand: APEX

## 2022-07-26 DEVICE — BIOLOX DELTA CERAMIC FEMORAL HEAD +5.0 36MM DIA 12/14 TAPER
Type: IMPLANTABLE DEVICE | Site: HIP | Status: FUNCTIONAL
Brand: BIOLOX DELTA

## 2022-07-26 DEVICE — PINNACLE POROCOAT ACETABULAR SHELL SECTOR II 56MM OD
Type: IMPLANTABLE DEVICE | Site: HIP | Status: FUNCTIONAL
Brand: PINNACLE POROCOAT

## 2022-07-26 DEVICE — ACTIS DUOFIX HIP PROSTHESIS (FEMORAL STEM 12/14 TAPER CEMENTLESS SIZE 4 HIGH COLLAR)  CE
Type: IMPLANTABLE DEVICE | Site: HIP | Status: FUNCTIONAL
Brand: ACTIS

## 2022-07-26 RX ORDER — MAGNESIUM HYDROXIDE 1200 MG/15ML
LIQUID ORAL AS NEEDED
Status: DISCONTINUED | OUTPATIENT
Start: 2022-07-26 | End: 2022-07-26 | Stop reason: HOSPADM

## 2022-07-26 RX ORDER — METOCLOPRAMIDE HYDROCHLORIDE 5 MG/ML
10 INJECTION INTRAMUSCULAR; INTRAVENOUS ONCE AS NEEDED
Status: DISCONTINUED | OUTPATIENT
Start: 2022-07-26 | End: 2022-07-26 | Stop reason: HOSPADM

## 2022-07-26 RX ORDER — CELECOXIB 100 MG/1
100 CAPSULE ORAL 2 TIMES DAILY
Status: DISCONTINUED | OUTPATIENT
Start: 2022-07-26 | End: 2022-07-26 | Stop reason: HOSPADM

## 2022-07-26 RX ORDER — HYDROMORPHONE HCL/PF 1 MG/ML
0.2 SYRINGE (ML) INJECTION
Status: DISCONTINUED | OUTPATIENT
Start: 2022-07-26 | End: 2022-07-26 | Stop reason: HOSPADM

## 2022-07-26 RX ORDER — DOCUSATE SODIUM 100 MG/1
100 CAPSULE, LIQUID FILLED ORAL 2 TIMES DAILY
Status: DISCONTINUED | OUTPATIENT
Start: 2022-07-26 | End: 2022-07-26 | Stop reason: HOSPADM

## 2022-07-26 RX ORDER — DEXAMETHASONE SODIUM PHOSPHATE 4 MG/ML
INJECTION, SOLUTION INTRA-ARTICULAR; INTRALESIONAL; INTRAMUSCULAR; INTRAVENOUS; SOFT TISSUE AS NEEDED
Status: DISCONTINUED | OUTPATIENT
Start: 2022-07-26 | End: 2022-07-26

## 2022-07-26 RX ORDER — ACETAMINOPHEN 325 MG/1
975 TABLET ORAL EVERY 8 HOURS
Status: DISCONTINUED | OUTPATIENT
Start: 2022-07-26 | End: 2022-07-26 | Stop reason: HOSPADM

## 2022-07-26 RX ORDER — ONDANSETRON 2 MG/ML
4 INJECTION INTRAMUSCULAR; INTRAVENOUS EVERY 6 HOURS PRN
Status: DISCONTINUED | OUTPATIENT
Start: 2022-07-26 | End: 2022-07-26 | Stop reason: HOSPADM

## 2022-07-26 RX ORDER — HYDROMORPHONE HCL/PF 1 MG/ML
0.5 SYRINGE (ML) INJECTION EVERY 2 HOUR PRN
Status: DISCONTINUED | OUTPATIENT
Start: 2022-07-26 | End: 2022-07-26 | Stop reason: HOSPADM

## 2022-07-26 RX ORDER — CEFAZOLIN SODIUM 2 G/50ML
2000 SOLUTION INTRAVENOUS ONCE
Status: DISCONTINUED | OUTPATIENT
Start: 2022-07-26 | End: 2022-07-26 | Stop reason: HOSPADM

## 2022-07-26 RX ORDER — ONDANSETRON 2 MG/ML
4 INJECTION INTRAMUSCULAR; INTRAVENOUS ONCE AS NEEDED
Status: DISCONTINUED | OUTPATIENT
Start: 2022-07-26 | End: 2022-07-26 | Stop reason: HOSPADM

## 2022-07-26 RX ORDER — OXYCODONE HYDROCHLORIDE 5 MG/1
5 TABLET ORAL EVERY 4 HOURS PRN
Status: DISCONTINUED | OUTPATIENT
Start: 2022-07-26 | End: 2022-07-26 | Stop reason: HOSPADM

## 2022-07-26 RX ORDER — ACETAMINOPHEN 325 MG/1
975 TABLET ORAL ONCE
Status: COMPLETED | OUTPATIENT
Start: 2022-07-26 | End: 2022-07-26

## 2022-07-26 RX ORDER — OXYCODONE HCL 10 MG/1
10 TABLET, FILM COATED, EXTENDED RELEASE ORAL ONCE
Status: COMPLETED | OUTPATIENT
Start: 2022-07-26 | End: 2022-07-26

## 2022-07-26 RX ORDER — BUPIVACAINE HYDROCHLORIDE 5 MG/ML
INJECTION, SOLUTION PERINEURAL
Status: DISCONTINUED | OUTPATIENT
Start: 2022-07-26 | End: 2022-07-26

## 2022-07-26 RX ORDER — ONDANSETRON 2 MG/ML
INJECTION INTRAMUSCULAR; INTRAVENOUS AS NEEDED
Status: DISCONTINUED | OUTPATIENT
Start: 2022-07-26 | End: 2022-07-26

## 2022-07-26 RX ORDER — MAGNESIUM HYDROXIDE/ALUMINUM HYDROXICE/SIMETHICONE 120; 1200; 1200 MG/30ML; MG/30ML; MG/30ML
30 SUSPENSION ORAL EVERY 6 HOURS PRN
Status: DISCONTINUED | OUTPATIENT
Start: 2022-07-26 | End: 2022-07-26 | Stop reason: HOSPADM

## 2022-07-26 RX ORDER — CHLORHEXIDINE GLUCONATE 0.12 MG/ML
15 RINSE ORAL ONCE
Status: COMPLETED | OUTPATIENT
Start: 2022-07-26 | End: 2022-07-26

## 2022-07-26 RX ORDER — SODIUM CHLORIDE 9 MG/ML
75 INJECTION, SOLUTION INTRAVENOUS CONTINUOUS
Status: DISCONTINUED | OUTPATIENT
Start: 2022-07-26 | End: 2022-07-26 | Stop reason: HOSPADM

## 2022-07-26 RX ORDER — LABETALOL HYDROCHLORIDE 5 MG/ML
INJECTION, SOLUTION INTRAVENOUS AS NEEDED
Status: DISCONTINUED | OUTPATIENT
Start: 2022-07-26 | End: 2022-07-26

## 2022-07-26 RX ORDER — OXYCODONE HYDROCHLORIDE 10 MG/1
10 TABLET ORAL EVERY 4 HOURS PRN
Status: DISCONTINUED | OUTPATIENT
Start: 2022-07-26 | End: 2022-07-26 | Stop reason: HOSPADM

## 2022-07-26 RX ORDER — GABAPENTIN 100 MG/1
200 CAPSULE ORAL 2 TIMES DAILY
Status: DISCONTINUED | OUTPATIENT
Start: 2022-07-26 | End: 2022-07-26 | Stop reason: HOSPADM

## 2022-07-26 RX ORDER — SODIUM CHLORIDE, SODIUM LACTATE, POTASSIUM CHLORIDE, CALCIUM CHLORIDE 600; 310; 30; 20 MG/100ML; MG/100ML; MG/100ML; MG/100ML
125 INJECTION, SOLUTION INTRAVENOUS CONTINUOUS
Status: DISCONTINUED | OUTPATIENT
Start: 2022-07-26 | End: 2022-07-26

## 2022-07-26 RX ORDER — MIDAZOLAM HYDROCHLORIDE 2 MG/2ML
INJECTION, SOLUTION INTRAMUSCULAR; INTRAVENOUS AS NEEDED
Status: DISCONTINUED | OUTPATIENT
Start: 2022-07-26 | End: 2022-07-26

## 2022-07-26 RX ORDER — CEFAZOLIN SODIUM 2 G/50ML
2000 SOLUTION INTRAVENOUS EVERY 8 HOURS
Status: COMPLETED | OUTPATIENT
Start: 2022-07-26 | End: 2022-07-26

## 2022-07-26 RX ORDER — DOCUSATE SODIUM 100 MG/1
100 CAPSULE, LIQUID FILLED ORAL 2 TIMES DAILY
Qty: 60 CAPSULE | Refills: 0 | Status: SHIPPED | OUTPATIENT
Start: 2022-07-26 | End: 2022-08-10

## 2022-07-26 RX ORDER — CELECOXIB 100 MG/1
100 CAPSULE ORAL 2 TIMES DAILY
Qty: 28 CAPSULE | Refills: 0 | Status: SHIPPED | OUTPATIENT
Start: 2022-07-26 | End: 2022-08-10 | Stop reason: ALTCHOICE

## 2022-07-26 RX ORDER — ASPIRIN 325 MG
325 TABLET ORAL 2 TIMES DAILY
Status: DISCONTINUED | OUTPATIENT
Start: 2022-07-26 | End: 2022-07-26 | Stop reason: HOSPADM

## 2022-07-26 RX ORDER — BUPIVACAINE HYDROCHLORIDE 7.5 MG/ML
INJECTION, SOLUTION INTRASPINAL AS NEEDED
Status: DISCONTINUED | OUTPATIENT
Start: 2022-07-26 | End: 2022-07-26

## 2022-07-26 RX ORDER — CEFAZOLIN SODIUM 2 G/50ML
SOLUTION INTRAVENOUS AS NEEDED
Status: DISCONTINUED | OUTPATIENT
Start: 2022-07-26 | End: 2022-07-26

## 2022-07-26 RX ORDER — OXYCODONE HYDROCHLORIDE 5 MG/1
TABLET ORAL
Qty: 60 TABLET | Refills: 0 | Status: SHIPPED | OUTPATIENT
Start: 2022-07-26 | End: 2022-08-01 | Stop reason: ALTCHOICE

## 2022-07-26 RX ORDER — ACETAMINOPHEN 325 MG/1
975 TABLET ORAL EVERY 8 HOURS
Refills: 0
Start: 2022-07-26 | End: 2022-09-07

## 2022-07-26 RX ORDER — PROPOFOL 10 MG/ML
INJECTION, EMULSION INTRAVENOUS CONTINUOUS PRN
Status: DISCONTINUED | OUTPATIENT
Start: 2022-07-26 | End: 2022-07-26

## 2022-07-26 RX ORDER — GABAPENTIN 300 MG/1
300 CAPSULE ORAL ONCE
Status: COMPLETED | OUTPATIENT
Start: 2022-07-26 | End: 2022-07-26

## 2022-07-26 RX ORDER — ASPIRIN 325 MG
325 TABLET ORAL 2 TIMES DAILY
Qty: 84 TABLET | Refills: 0 | Status: SHIPPED | OUTPATIENT
Start: 2022-07-26 | End: 2022-10-26

## 2022-07-26 RX ORDER — SCOLOPAMINE TRANSDERMAL SYSTEM 1 MG/1
1 PATCH, EXTENDED RELEASE TRANSDERMAL ONCE
Status: DISCONTINUED | OUTPATIENT
Start: 2022-07-26 | End: 2022-07-26 | Stop reason: HOSPADM

## 2022-07-26 RX ORDER — BISACODYL 10 MG
10 SUPPOSITORY, RECTAL RECTAL DAILY PRN
Status: DISCONTINUED | OUTPATIENT
Start: 2022-07-26 | End: 2022-07-26 | Stop reason: HOSPADM

## 2022-07-26 RX ORDER — FENTANYL CITRATE/PF 50 MCG/ML
25 SYRINGE (ML) INJECTION
Status: DISCONTINUED | OUTPATIENT
Start: 2022-07-26 | End: 2022-07-26 | Stop reason: HOSPADM

## 2022-07-26 RX ORDER — PROPOFOL 10 MG/ML
INJECTION, EMULSION INTRAVENOUS AS NEEDED
Status: DISCONTINUED | OUTPATIENT
Start: 2022-07-26 | End: 2022-07-26

## 2022-07-26 RX ORDER — SODIUM CHLORIDE, SODIUM LACTATE, POTASSIUM CHLORIDE, CALCIUM CHLORIDE 600; 310; 30; 20 MG/100ML; MG/100ML; MG/100ML; MG/100ML
20 INJECTION, SOLUTION INTRAVENOUS CONTINUOUS
Status: DISCONTINUED | OUTPATIENT
Start: 2022-07-26 | End: 2022-07-26

## 2022-07-26 RX ORDER — SODIUM CHLORIDE 9 MG/ML
INJECTION, SOLUTION INTRAVENOUS AS NEEDED
Status: DISCONTINUED | OUTPATIENT
Start: 2022-07-26 | End: 2022-07-26 | Stop reason: HOSPADM

## 2022-07-26 RX ORDER — PANTOPRAZOLE SODIUM 40 MG/1
40 TABLET, DELAYED RELEASE ORAL DAILY
Status: DISCONTINUED | OUTPATIENT
Start: 2022-07-26 | End: 2022-07-26 | Stop reason: HOSPADM

## 2022-07-26 RX ADMIN — SODIUM CHLORIDE 4 MG/HR: 0.9 INJECTION, SOLUTION INTRAVENOUS at 10:49

## 2022-07-26 RX ADMIN — MIDAZOLAM 2 MG: 1 INJECTION INTRAMUSCULAR; INTRAVENOUS at 09:10

## 2022-07-26 RX ADMIN — OXYCODONE HYDROCHLORIDE 10 MG: 10 TABLET, FILM COATED, EXTENDED RELEASE ORAL at 07:46

## 2022-07-26 RX ADMIN — PROPOFOL 80 MG: 10 INJECTION, EMULSION INTRAVENOUS at 09:13

## 2022-07-26 RX ADMIN — GABAPENTIN 300 MG: 300 CAPSULE ORAL at 07:45

## 2022-07-26 RX ADMIN — DOCUSATE SODIUM 100 MG: 100 CAPSULE, LIQUID FILLED ORAL at 13:02

## 2022-07-26 RX ADMIN — BUPIVACAINE HYDROCHLORIDE 10 ML: 5 INJECTION, SOLUTION PERINEURAL at 12:00

## 2022-07-26 RX ADMIN — CEFAZOLIN SODIUM 2000 MG: 2 SOLUTION INTRAVENOUS at 14:36

## 2022-07-26 RX ADMIN — PROPOFOL 90 MCG/KG/MIN: 10 INJECTION, EMULSION INTRAVENOUS at 09:13

## 2022-07-26 RX ADMIN — LABETALOL 20 MG/4 ML (5 MG/ML) INTRAVENOUS SYRINGE 10 MG: at 09:51

## 2022-07-26 RX ADMIN — CHLORHEXIDINE GLUCONATE 15 ML: 1.2 SOLUTION ORAL at 07:46

## 2022-07-26 RX ADMIN — ACETAMINOPHEN 975 MG: 325 TABLET, FILM COATED ORAL at 07:46

## 2022-07-26 RX ADMIN — HYDROMORPHONE HYDROCHLORIDE 0.5 MG: 1 INJECTION, SOLUTION INTRAMUSCULAR; INTRAVENOUS; SUBCUTANEOUS at 15:19

## 2022-07-26 RX ADMIN — PANTOPRAZOLE SODIUM 40 MG: 40 TABLET, DELAYED RELEASE ORAL at 13:03

## 2022-07-26 RX ADMIN — SODIUM CHLORIDE, SODIUM LACTATE, POTASSIUM CHLORIDE, AND CALCIUM CHLORIDE 125 ML/HR: .6; .31; .03; .02 INJECTION, SOLUTION INTRAVENOUS at 07:54

## 2022-07-26 RX ADMIN — GABAPENTIN 200 MG: 100 CAPSULE ORAL at 13:02

## 2022-07-26 RX ADMIN — SODIUM CHLORIDE 75 ML/HR: 0.9 INJECTION, SOLUTION INTRAVENOUS at 12:58

## 2022-07-26 RX ADMIN — SCOPALAMINE 1 PATCH: 1 PATCH, EXTENDED RELEASE TRANSDERMAL at 07:46

## 2022-07-26 RX ADMIN — OXYCODONE HYDROCHLORIDE 5 MG: 5 TABLET ORAL at 13:59

## 2022-07-26 RX ADMIN — DEXAMETHASONE SODIUM PHOSPHATE 4 MG: 4 INJECTION, SOLUTION INTRA-ARTICULAR; INTRALESIONAL; INTRAMUSCULAR; INTRAVENOUS; SOFT TISSUE at 09:59

## 2022-07-26 RX ADMIN — BUPIVACAINE HYDROCHLORIDE IN DEXTROSE 1.8 ML: 7.5 INJECTION, SOLUTION SUBARACHNOID at 09:10

## 2022-07-26 RX ADMIN — ONDANSETRON 4 MG: 2 INJECTION INTRAMUSCULAR; INTRAVENOUS at 09:59

## 2022-07-26 RX ADMIN — ACETAMINOPHEN 975 MG: 325 TABLET, FILM COATED ORAL at 13:03

## 2022-07-26 RX ADMIN — CEFAZOLIN SODIUM 2000 MG: 2 SOLUTION INTRAVENOUS at 09:20

## 2022-07-26 RX ADMIN — TRANEXAMIC ACID 1000 MG: 1 INJECTION, SOLUTION INTRAVENOUS at 09:24

## 2022-07-26 RX ADMIN — LABETALOL 20 MG/4 ML (5 MG/ML) INTRAVENOUS SYRINGE 10 MG: at 10:01

## 2022-07-26 NOTE — OCCUPATIONAL THERAPY NOTE
OT EVALUATION/TREATMENT       07/26/22 1430   Note Type   Note type Evaluation   Additional Comments Pts wife present during session   Restrictions/Precautions   Weight Bearing Precautions Per Order Yes   RLE Weight Bearing Per Order WBAT  (POD #0 s/p R anterior MARIN)   Pain Assessment   Pain Assessment Tool 0-10   Pain Score 7   Pain Location/Orientation Orientation: Right;Location: Hip   Home Living   Type of [address] One level  (2-3 SHI)   Bathroom Shower/Tub Tub/shower unit   Bathroom Toilet Raised   Home Equipment Cane   Additional Comments Patient ambulated independently without assistive device PTA   Prior Function   Level of [place] Independent with ADLs and functional mobility   Lives With Spouse   Receives Help From Family   ADL Assistance Independent   IADLs Independent   Falls in the last 6 months 0   Vocational Full time employment   Comments POD #0 s/p R anterior MARIN   ADL   Eating Assistance 7  Independent   Grooming Assistance 7  Independent   UB Bathing Assistance 5  Supervision/Setup   LB Bathing Assistance 4  Minimal Assistance   [address] 5  Supervision/Setup   LB Dressing Assistance 4  [address]  4  Nancy Mouco 20 to Supine 5  Supervision   Additional Comments Pt seated in recliner upon entrance of room   Transfers   Sit to Stand 5  Supervision   Additional items Assist x 1   Stand to Sit 5  Supervision   Additional items Assist x 1   Functional Mobility   Functional Mobility 5  Supervision   Additional Comments functional household distance to and from the bathroom   Additional items Rolling walker   Balance   Static Sitting Good   Dynamic Sitting Fair +   [address] [place] [address]   Activity Tolerance   Activity Tolerance Patient tolerated treatment well;Patient limited by pain   Nurse Made Aware Yes, Bryant DILLON   RUE Assessment   RUE Assessment WFL   LUE Assessment   LUE Assessment WFL   Cognition   Overall Cognitive Status WFL   Arousal/Participation Alert; Responsive; Cooperative   Attention Within functional limits   Orientation Level Oriented X4   Following Commands Follows all commands and directions without difficulty   Assessment   Limitation Decreased ADL status; Decreased endurance;Decreased self-care trans;Decreased high-level ADLs   Prognosis Good   Assessment Patient evaluated by Occupational Therapy  Patient admitted with Status post total replacement of right hip  The patients occupational profile, medical and therapy history includes a extensive additional review of physical, cognitive, or psychosocial history related to current functional performance  Comorbidities affecting functional mobility and ADLS include: diabetes, gout and hypertension  Prior to admission, patient was independent with ADLS and independent with IADLS  The evaluation identifies the following performance deficits: weakness, impaired balance, decreased endurance, increased fall risk, new onset of impairment of functional mobility, decreased ADLS, decreased IADLS, pain, decreased activity tolerance, ortheopedic restrictions and decreased strength, that result in activity limitations and/or participation restrictions  This evaluation requires clinical decision making of high complexity, because the patient presents with comorbidites that affect occupational performance and required significant modification of tasks or assistance with consideration of multiple treatment options  The Barthel Index was used as a functional outcome tool presenting with a score of Barthel Index Score: 60, indicating moderate limitations of functional mobility and ADLS  The patient's raw score on the AM-PAC Daily Activity inpatient short form is 20, standardized score is 42 03, greater than 39 4  Patients at this level are likely to benefit from DC to home   Please refer to the recommendation of the Occupational Therapist for safe DC planning  Patient will benefit from skilled Occupational Therapy services to address above deficits and facilitate a safe return to prior level of function  Goals   Patient Goals to have less pain and go home   STG Time Frame   (1-7 days)   Short Term Goal  Goals established to promote Patient Goals: to have less pain and go home:  Patient will increase standing tolerance to 10 minutes during ADL task to decrease assistance level and decrease fall risk; Patient will increase bed mobility to independent in preparation for ADLS and transfers; Patient will increase functional mobility to and from bathroom with rolling walker independently to increase performance with ADLS and to use a toilet; Patient will improve functional activity tolerance to 10 minutes of sustained functional tasks to increase participation in basic self-care and decrease assistance level;  Patient will be able to to verbalize understanding and perform energy conservation/proper body mechanics during ADLS and functional mobility at least 75% of the time with minimal cueing to decrease signs of fatigue and increase stamina to return to prior level of function; Patient will increase dynamic sitting balance to good to improve the ability to sit at edge of bed or on a chair for ADLS;  Patient will increase dynamic standing balance to fair+ to improve postural stability and decrease fall risk during standing ADLS and transfers     LTG Time Frame   (8-14 days)   Long Term Goal Patient will increase standing tolerance to 20 minutes during ADL task to decrease assistance level and decrease fall risk;Patient will tolerate 20 minutes of UE ROM/strengthening to increase general activity tolerance and performance in ADLS/IADLS; Patient will improve functional activity tolerance to 20 minutes of sustained functional tasks to increase participation in basic self-care and decrease assistance level;  Patient will be able to to verbalize understanding and perform energy conservation/proper body mechanics during ADLS and functional mobility at least 90% of the time with no cueing to decrease signs of fatigue and increase stamina to return to prior level of function;  Patient will increase static/dynamic standing balance to good to improve postural stability and decrease fall risk during standing ADLS and transfers  Pt will score >/= 24/24 on AM-PAC Daily Activity Inpatient scale to promote safe independence with ADLs and functional mobility; Pt will score >/= 90/100 on Barthel Index in order to decrease caregiver assistance needed and increase ability to perform ADLs and functional mobility  Functional Transfer Goals   Pt Will Perform All Functional Transfers   (STG independent)   ADL Goals   Pt Will Perform Bathing   (STG supervision LTG independent)   Pt Will Perform UE Dressing   (STG independent)   Pt Will Perform LE Dressing   (STG supervision LTG independent)   Pt Will Perform Toileting   (STG supervision LTG independent)   Plan   Treatment Interventions ADL retraining;Functional transfer training; Endurance training;Patient/family training;Equipment evaluation/education; Compensatory technique education; Energy conservation; Activityengagement   Goal Expiration Date 08/09/22   OT Frequency 5x/wk   Additional Treatment Session   Start Time 1420   End Time 1430   Treatment Assessment S: Patient reports 7/10 R hip pain, RN gave pain medication  O: Patient completed ambulating transfer to standard toilet with min assist and use of RW, with verbal cues for safe hand placement; min assist to ambulate short household distance from the bathroom with RW; supervision to germán shirt; supervision to germán shorts, min assist to germán B socks  Pt requested to lay in bed due to pain and fatigue  Patient edcuated on car transfers, verbalized understanding  Family edcuation provided to pts wife who was present during the session   At the end of the session, the patient was supine in bed with head of bed raised, with alarm donned  A: Patient is progressing well for POD#0 s/p R anterior MARIN, WBAT RLE per ortho  Per OT patient is OK to discharge home when medically cleared  Patient has a supportive family who can assist when needed  P: Home with outpatient PT     Recommendation   OT Discharge Recommendation Home with outpatient rehabilitation   AM-PAC Daily Activity Inpatient   Lower Body Dressing 3   Bathing 3   Toileting 3   Upper Body Dressing 3   Grooming 4   Eating 4   Daily Activity Raw Score 20   Daily Activity Standardized Score (Calc for Raw Score >=11) 42 03   AM-PAC Applied Cognition Inpatient   Following a Speech/Presentation 4   Understanding Ordinary Conversation 4   Taking Medications 4   Remembering Where Things Are Placed or Put Away 4   Remembering List of 4-5 Errands 4   Taking Care of Complicated Tasks 4   Applied Cognition Raw Score 24   Applied Cognition Standardized Score 62 21   Barthel Index   Feeding 10   Bathing 0   Grooming Score 5   Dressing Score 5   Bladder Score 10   Bowels Score 10   Toilet Use Score 5   Transfers (Bed/Chair) Score 10   Mobility (Level Surface) Score 0   Stairs Score 5   Barthel Index Score 60   Licensure   [place] License Number  [employer], OTS

## 2022-07-26 NOTE — ANESTHESIA PROCEDURE NOTES
Peripheral Block    Patient location during procedure: post-op  Start time: 7/26/2022 12:00 PM  Reason for block: at surgeon's request and post-op pain management  Staffing  Performed: Anesthesiologist   Anesthesiologist: Francoise Inman MD  Preanesthetic Checklist  Completed: patient identified, IV checked, site marked, risks and benefits discussed, surgical consent, monitors and equipment checked, pre-op evaluation and timeout performed  Peripheral Block  Patient position: supine  Prep: ChloraPrep  Patient monitoring: heart rate, continuous pulse ox and frequent blood pressure checks  Anesthesia block type: PENG block  Laterality: right  Injection technique: single-shot  Procedures: ultrasound guided, Ultrasound guidance required for the procedure to increase accuracy and safety of medication placement and decrease risk of complications    Ultrasound permanent image savedbupivacaine (MARCAINE) 0 5 % - Perineural   10 mL - 7/26/2022 12:00:00 PM (with exparel 10 cc)  Needle  Needle type: Stimuplex   Needle gauge: 22 G  Needle length: 10 cm  Needle localization: ultrasound guidance  Assessment  Injection assessment: incremental injection, local visualized surrounding nerve on ultrasound, negative aspiration for heme and no paresthesia on injection  Paresthesia pain: none  Heart rate change: no  Slow fractionated injection: yes  Post-procedure:  site cleaned  patient tolerated the procedure well with no immediate complications

## 2022-07-26 NOTE — CASE MANAGEMENT
Case Management Progress Note    Patient name Judah Jones  Location 18 [facility] 217/2 [address] MRN [id number]  : 1965 Date 2022       LOS (days): 0  Geometric Mean LOS (GMLOS) (days):   Days to 85 [facility]:        OBJECTIVE:        Current admission status: Outpatient Surgery  Preferred Pharmacy:   [facility], [facility], [facility] [address]  Phone: [phone] Fax: [phone]    [facility] [address] [address]  Phone: [phone] Fax: [phone]    Primary Care Provider: Chely Flores MD    Primary Insurance: [place] [id number]  Secondary Insurance:     PROGRESS NOTE:  CM was notified of pts need of rolling walker  Pt is ok with any supplier  CM delivered and placed order through [facility] in [place]  Cm delivered walker bedside and notified Olga Mast of delivery  Pt is ok if there is any copay

## 2022-07-26 NOTE — OP NOTE
OPERATIVE REPORT  PATIENT NAME: Fer Corea    :  1965  MRN: [id number]  Pt Location: [place] OR ROOM 03    SURGERY DATE: 2022    Surgeon(s) and Role:     * Rock Frye, DO - Primary     * Luis Santos PA-C - Assisting, no qualified resident was available an assistant was needed for patient positioning, prepping and draping, soft tissue retraction, protection of vital structures throughout the case, exposure of the femur and acetabulum for preparation implantation of final prosthesis, superficial closure, and to complete the case safely  Landon Casanova,  ATC/OTC - 2nd Assist    * Monico Cortez, ATC/OTC - 3rd Assist    Preop Diagnosis:  Primary osteoarthritis of one hip, right [M16 11]  Right hip pain    Post-Op Diagnosis Codes:     * Primary osteoarthritis of one hip, right [M16 11]     * Right hip pain    Procedure(s) (LRB):  ARTHROPLASTY HIP TOTAL ANTERIOR, NAVIGATED (Right)    Specimen(s):  * No specimens in log *    Estimated Blood Loss:   160 mL    Drains:  None  Urethral Catheter (Active)   Number of days:        Anesthesia Type:   Spinal with sedation, postoperative ANIYA block with Exparel    Intravenous fluids:  400 cc    Antibiotics:  Ancef 2 g    Urine output:  No [place]    Implant Name Type Inv   Item Serial No   Lot No  LRB No  Used Action   SHELL ACETABULAR 56MM POROUS SOLID LCK RING PINNACLE - [id number]  SHELL ACETABULAR 56MM POROUS SOLID LCK RING PINNACLE  DEPUY [id number] Right 1 Implanted   ELIMINATOR ACTB THRD POS STOP - [id number]  ELIMINATOR ACTB THRD POS STOP  DEPUY [id number] Right 1 Implanted   LINER ACTB ULT LNK PE 36 X 56MM 0DEG NEUTRAL ALTRX - [id number]  LINER ACTB ULT LNK PE 36 X 56MM 0DEG NEUTRAL ALTRX  DEPUY [id number] Right 1 Implanted   STEM FEM SZ 4 TAPER CMNTLS HI COLLAR ACTIS - [id number]  STEM FEM SZ 4 TAPER CMNTLS HI COLLAR ACTIS  DEPUY [id number] Right 1 Implanted   HEAD FEMORAL CMNTLS  TPR 36MM +5MM BIOLOX DELTA ARTICULEZE - [id number]  HEAD FEMORAL CMNTLS 12/14 TPR 36MM +5MM BIOLOX DELTA Karyn Jump [id number] Right 1 Implanted       Operative Indications:  Primary osteoarthritis of one hip, right [M16 11]  Patient is a very pleasant 51-year-old male known to me for treatment of his activity-related right hip and groin pain due to severe underlying osteoarthritis of his right hip  The patient has failed conservative measures of treatment, his activity-related pain has persisted, and these findings in the setting of his severe osteoarthritis I recommended he undergo direct anterior right total hip arthroplasty  He was agreeable to this  The patient was a good candidate for same-day surgery  The patient was agreeable to this  Consents were signed and placed in chart  Patient was optimized by his medical subspecialist in preparation for the procedure today  The patient underwent direct anterior right total hip arthroplasty on 7/26/2022  Operative Findings:  Intraoperatively there was grade 4 degenerative change of both the acetabulum and femur  Hip navigation software was utilized throughout the case  The Press-Fit acetabular component was implanted approximately 46° of inclination 24° of anteversion with excellent Press-Fit  The polyethylene liner was locked into place  The Press-Fit femoral component was implanted in proximal femur and at its final station had excellent rotational and axial stability  The hip was taken through range of motion and was found that the hip was stable to 50° of internal rotation, 110° of external rotation, 50° of external rotation to with the leg lowered to the floor  The hip navigation software was utilized to evaluate leg length and the patient's operative extremity was lengthened approximately 5 mm in accordance with the preoperative templating plan  Patient tolerated procedure well  There were no complications      Complications:   None    Procedure and Technique:  The patient was identified and marked in the preoperative holding area, and the correct operative extremity and intended procedure was confirmed  The consents were visualized and confirmed for correct procedure and laterality  The patient was then taken back to the operating room where there were administered spinal anesthesia by the anesthesia staff  The patient was administered 2 g of Ancef intravenously by the anesthesia staff  The patient was then transferred to the [facility] table for the procedure  After the patient was appropriately positioned, a AP of the pelvis was obtained using fluoroscopy to evaluate preoperative leg lengths  It was found that the right lower extremity was approximately 5 mm shorter than the left lower extremity  The right lower extremity was prepped and draped in a standard sterile fashion  After a timeout was performed and all members of the operating room team were in agreement of the correct patient, and correct intended procedure the bony landmarks were identified using marking pen  Tranexamic acid was administered by anesthesia  A modified Jackson-Orozco incision was delineated obliquely starting 2 cm distal and 2 cm laterally to the ASIS  Sharp dissection was carried down through the subcutaneous tissues to the investing fascia of the tensor fascia staci muscle  Electrocautery was used to maintain hemostasis in the subcutaneous tissues  The investing fascia of the tensor fascia staci was incised in line with the fibers in this compartment was entered bluntly and the muscle was retracted laterally  The perforating circumflex femoral vessels were carefully identified and cauterized using electrocautery and the Aquamantis  Dissection was then carried down to the right hip capsule, and the pre-capsular fat was excised  A capsulotomy was carried out across the edge of the acetabulum superiorly down the femoral neck and into the intra-articular trochanteric line  Both sides of the capsulotomy were tagged with a Ethibond suture  Dissection was carried out medially around the medial calcar  It was also carried out laterally to expose the saddle  The femoral neck osteotomy was templated and carried out using an oscillating saw and finished with a osteotome in accordance with preoperative templating  The femoral head was extracted from the acetabulum using a corkscrew  There were grade 4 changes diffusely with osteophytes peripherally  The femoral head was sized  There were grade 4 changes diffusely in the acetabulum  The acetabulum was cleaned of debris and pulvinar, the CHRISTIAN was well-visualized, the remaining labrum was removed, and reaming began  The acetabulum was reamed starting with a size 50 reamer and carried up in 1-2 mm increments until a size 55 was reached  The hip navigation software was utilized for the case  A size 55 acetabular trial was impacted into the acetabulum and demonstrated an appropriate fit  The appropriate abduction and anteversion of approximately 46° and 24° respectively was confirmed using fluoroscopy  The trial implant was removed, the host bone was touched with a size 56 Reamer, and a size 56 [place] 2 cluster hole final acetabular component was impacted into place under direct visualization with fluoroscopy  This demonstrated good scratch fit  The final 36 mm/56 mm Altrx highly cross-linked polyethylene insert was impacted into the acetabular component  The liner was locked in its position on visual and tactile inspection  Of note the patient had significantly dense and hard bone during preparation  Attention was then turned back to the proximal femur  The appropriate proximal femoral releases were utilized as needed to mobilize the femur, ensuring not to release the obturator externus  The soft tissue was removed from the region between the femoral neck and the greater trochanter a box osteotome was used to cut into the lateral aspect of the proximal femur    The small starter broach was then inserted into the proximal femur adding proximally 5-7° of anteversion while preparing the proximal femur  Broaching was carried up in sequence until a size 4 broach demonstrated excellent fit and rotational stability  Again the patient had significantly dense and hard bone during femoral preparation  The calcar planer was used to bring the femoral neck osteotomy coplanar with the broach  Trialing was performed  A trial construct with a high offset neck and a +5 36 mm femoral head showed excellent stability on 110° of external rotation, 50° of internal rotation, and 50° of external rotation with extension of the hip to the floor  Leg lengths were evaluated on fluoroscopy images and the hip navigation software and the operative extremity was lengthened 5 mm in accordance with the preoperative template  The trial components were removed from the proximal femur and the final Depuy Actis size for the high offset femoral component was inserted into the femur by hand and impacted into place  The trunnion was cleaned and dried and the final Biolox delta ceramic +5 36 mm head was impacted upon the final femoral stem  Hip was reduced and taken through stability testing  The patient demonstrated excellent stability with 110° of external rotation, 50° of internal rotation, and 50° of external rotation and extension of the hip to the floor  There was no lateral subluxation of the hip on manual testing  Leg lengths were again evaluated using fluoroscopy and the leg lengths were unchanged from trial   The wound was copiously irrigated with Irrisept followed by normal saline solution, the capsule was closed using a #2 Ethibond suture  The wound was again irrigated  The investing fascia of the tensor fascia staci muscle was closed using a bidirectional barbed #2 barbed suture    The deep subcutaneous tissues were reapproximated using an undyed 0 Vicryl, the superficial subcutaneous tissues were reapproximated using an undyed 2-0 Vicryl, the skin edges were reapproximated using a 3-0 bidirectional barbed Monocryl suture, and the skin edges sealed with Exofin  After the skin adhesive had dried a silver impregnated Mepilex dressing was applied over the surgical incision  The patient was awakened from spinal anesthesia with sedation and transferred to PACU in stable condition to start his same-day discharge pathway       I was present for all critical portions of the procedure    Patient Disposition:  PACU       SIGNATURE: Pearl Cohen DO  DATE: July 26, 2022  TIME: 2:01 PM

## 2022-07-26 NOTE — ANESTHESIA PROCEDURE NOTES
Spinal Block    Patient location during procedure: OR  Start time: 7/26/2022 9:11 AM  Reason for block: at surgeon's request and primary anesthetic  Staffing  Performed: Anesthesiologist   Anesthesiologist: Vania Leiva MD  Preanesthetic Checklist  Completed: patient identified, IV checked, site marked, risks and benefits discussed, surgical consent, monitors and equipment checked, pre-op evaluation and timeout performed  Spinal Block  Patient position: sitting  Prep: Betadine  Patient monitoring: heart rate, continuous pulse ox and frequent blood pressure checks  Approach: midline  Location: L3-4  Injection technique: single-shot  Needle  Needle type: pencil-tip   Needle gauge: 25 G  Needle length: 10 cm  Assessment  Sensory level: T10  Events: cerebrospinal fluid  Injection Assessment:  negative aspiration for heme, no paresthesia on injection and positive aspiration for clear CSF    Post-procedure:  site cleaned

## 2022-07-26 NOTE — ANESTHESIA POSTPROCEDURE EVALUATION
Post-Op Assessment Note    CV Status:  Stable  Pain Score: 0    Pain management: adequate     Mental Status:  Alert and awake   Hydration Status:  Euvolemic   PONV Controlled:  Controlled   Airway Patency:  Patent      Post Op Vitals Reviewed: Yes      Staff: CRNA         No complications documented      /67 (07/26/22 1140)    Temp 98 1 °F (36 7 °C) (07/26/22 1140)    Pulse 73 (07/26/22 1140)   Resp 16 (07/26/22 1140)    SpO2 99 % (07/26/22 1140)

## 2022-07-26 NOTE — DISCHARGE INSTRUCTIONS
Direct Anterior Total Hip Replacement     WHAT YOU SHOULD KNOW:   Minimally invasive total hip replacement is surgery to replace a damaged hip joint with an implant  AFTER YOU LEAVE:     Medicines:   Anticoagulants  are a type of blood thinner medicine that helps prevent clots  Clots can cause strokes, heart attacks, and death  These medicines may cause you to bleed or bruise more easily  You will be on full-dose aspirin 325mg twice a day for 6 weeks  Watch for bleeding from your gums or nose  Watch for blood in your urine and bowel movements  Use a soft washcloth and a soft toothbrush  If you shave, use an electric razor  Avoid activities that can cause bruising or bleeding  Tell your healthcare provider about all medicines you take because many medicines cannot be used with anticoagulants  Do not start or stop any medicines unless your healthcare provider tells you to  Tell your dentist and other healthcare providers that you take anticoagulants  Wear a bracelet or necklace that says you take this medicine  NSAIDs  help decrease swelling, pain, and fever  This medicine is available with or without a doctor's order  NSAIDs can cause stomach bleeding or kidney problems in certain people  If you take blood thinner medicine, always ask your orthopedist if NSAIDs are safe for you  Always read the medicine label and follow directions  You will be given Celebrex 100 mg to take twice a day for the first 2 weeks postoperatively  Prescription pain medicine  You should take 975mg of over the counter acetaminophen (tylenol) every 8 hours for baseline pain control  You will also be given oxycodone 5mg tablets  Take 1-2 by mouth every 4 to 6 hours as needed for pain  Stool softeners  make it easier for you to have a bowel movement  You may need this medicine to treat or prevent constipation  You will be given Colace 100mg to take twice a day    Take your medicine as directed    Contact your orthopedist if you think your medicine is not helping or if you have side effects  Tell him if you are allergic to any medicine  Keep a list of the medicines, vitamins, and herbs you take  Include the amounts, and when and why you take them  Bring the list or the pill bottles to follow-up visits  Carry your medicine list with you in case of an emergency  Follow up with your orthopedist as directed: You may need to return to have your wound checked  Write down your questions so you remember to ask them during your visits  Please schedule an appointment to see Dr Rickie Almeida 2 weeks after surgery  Physical therapy:  A physical therapist teaches you exercises to help improve movement and strength, and to decrease pain  You will begin outpatient physical therapy later this week as planned  Wound Care:  Please leave the Mepilex dressing in place for 7 days after surgery  After 7 days, you may remove the dressing and leave the incision open to air  If the incision is uncomfortable under clothing after the Mepilex is removed, you may cover it with a a dry sterile dressing, but should remain dry at all times  Once the dressing is off, please keep the incision dry for another week until your follow up appointment    Self-care:   Use a cane, walker, or crutches as directed  These devices will help decrease your risk of falling  Your therapist will guide you  Use ice:  Ice helps decrease swelling and pain  Ice may also help prevent tissue damage  Use an ice pack or put crushed ice in a plastic bag  Cover it with a towel, and place it on your knee for 15 to 20 minutes every hour as directed  Prevent dislocation of your hip implant:      Avoid extremes of external rotation of the leg    Contact your orthopedist if:  You have a fever over 101 0°F     You have trouble moving or bending your joint  You have increased pain and swelling in your joint, even after you take pain medicine      You have back pain or lower leg pain when you bend your foot upwards  You have questions or concerns about your condition or care  Seek immediate care or call 911 if: You feel like you are going to faint  Blood soaks through/saturates your bandage  Your incision comes apart  Your incision is red, swollen, or draining pus  You cannot walk or move your joint, or the limb is numb  Your arm or leg feels warm, tender, and painful  It may look swollen and red  You have a seizure or feel confused  You feel lightheaded, short of breath, and have chest pain  You have chest pain when you take a deep breath or cough  You may cough up blood  © 2014 [address] is for End User's use only and may not be sold, redistributed or otherwise used for commercial purposes  All illustrations and images included in CareNotes® are the copyrighted property of [employer]  or [employer]ago Ugart[employer]  The above information is an  only  It is not intended as medical advice for individual conditions or treatments  Talk to your doctor, nurse or pharmacist before following any medical regimen to see if it is safe and effective for you

## 2022-07-26 NOTE — PLAN OF CARE
Problem: PAIN - ADULT  Goal: Verbalizes/displays adequate comfort level or baseline comfort level  Description: Interventions:  - Encourage patient to monitor pain and request assistance  - Assess pain using appropriate pain scale  - Administer analgesics based on type and severity of pain and evaluate response  - Implement non-pharmacological measures as appropriate and evaluate response  - Consider cultural and social influences on pain and pain management  - Notify physician/advanced practitioner if interventions unsuccessful or patient reports new pain  Outcome: Progressing     Problem: SAFETY ADULT  Goal: Patient will remain free of falls  Description: INTERVENTIONS:  - Educate patient/family on patient safety including physical limitations  - Instruct patient to call for assistance with activity   - Consult OT/PT to assist with strengthening/mobility   - Keep Call bell within reach  - Keep bed low and locked with side rails adjusted as appropriate  - Keep care items and personal belongings within reach  - Initiate and maintain comfort rounds  - Make Fall Risk Sign visible to staff  - Offer Toileting every  Hours, in advance of need  - Initiate/Maintain alarm  - Obtain necessary fall risk management equipment:   - Apply yellow socks and bracelet for high fall risk patients  - Consider moving patient to room near nurses station  Outcome: Progressing  Goal: Maintain or return to baseline ADL function  Description: INTERVENTIONS:  -  Assess patient's ability to carry out ADLs; assess patient's baseline for ADL function and identify physical deficits which impact ability to perform ADLs (bathing, care of mouth/teeth, toileting, grooming, dressing, etc )  - Assess/evaluate cause of self-care deficits   - Assess range of motion  - Assess patient's mobility; develop plan if impaired  - Assess patient's need for assistive devices and provide as appropriate  - Encourage maximum independence but intervene and supervise when necessary  - Involve family in performance of ADLs  - Assess for home care needs following discharge   - Consider OT consult to assist with ADL evaluation and planning for discharge  - Provide patient education as appropriate  Outcome: Progressing  Goal: Maintains/Returns to pre admission functional level  Description: INTERVENTIONS:  - Perform BMAT or MOVE assessment daily    - Set and communicate daily mobility goal to care team and patient/family/caregiver  - Collaborate with rehabilitation services on mobility goals if consulted  - Perform Range of Motion  times a day  - Reposition patient every  hours    - Dangle patient  times a day  - Stand patient  times a day  - Ambulate patient  times a day  - Out of bed to chair  times a day   - Out of bed for meals  times a day  - Out of bed for toileting  - Record patient progress and toleration of activity level   Outcome: Progressing     Problem: DISCHARGE PLANNING  Goal: Discharge to home or other facility with appropriate resources  Description: INTERVENTIONS:  - Identify barriers to discharge w/patient and caregiver  - Arrange for needed discharge resources and transportation as appropriate  - Identify discharge learning needs (meds, wound care, etc )  - Arrange for interpretive services to assist at discharge as needed  - Refer to Case Management Department for coordinating discharge planning if the patient needs post-hospital services based on physician/advanced practitioner order or complex needs related to functional status, cognitive ability, or social support system  Outcome: Progressing     Problem: MUSCULOSKELETAL - ADULT  Goal: Maintain or return mobility to safest level of function  Description: INTERVENTIONS:  - Assess patient's ability to carry out ADLs; assess patient's baseline for ADL function and identify physical deficits which impact ability to perform ADLs (bathing, care of mouth/teeth, toileting, grooming, dressing, etc )  - Assess/evaluate cause of self-care deficits   - Assess range of motion  - Assess patient's mobility  - Assess patient's need for assistive devices and provide as appropriate  - Encourage maximum independence but intervene and supervise when necessary  - Involve family in performance of ADLs  - Assess for home care needs following discharge   - Consider OT consult to assist with ADL evaluation and planning for discharge  - Provide patient education as appropriate  Outcome: Progressing  Goal: Maintain proper alignment of affected body part  Description: INTERVENTIONS:  - Support, maintain and protect limb and body alignment  - Provide patient/ family with appropriate education  Outcome: Progressing

## 2022-07-26 NOTE — PHYSICAL THERAPY NOTE
PHYSICAL THERAPY EVALUATION/TREATMENT     07/26/22 1340   PT Last Visit   PT Visit Date 07/26/22   Note Type   Note type Evaluation   Pain Assessment   Pain Assessment Tool 0-10   Pain Score 2   Pain Location/Orientation Orientation: Right;Location: Hip   Hospital Pain Intervention(s) Cold applied; Ambulation/increased activity   Restrictions/Precautions   Weight Bearing Precautions Per Order Yes   RLE Weight Bearing Per Order WBAT   Other Precautions Fall Risk;Pain   Home Living   Type of [address] One level  (3 SHI)   Home Equipment Cane   Prior Function   Level of Stacy Independent with ADLs and functional mobility   Lives With Spouse   Receives Help From Family   ADL Assistance Independent   IADLs Independent   Vocational Full time employment   General   Additional Pertinent History pt is POD zero for right anterior MARIN  Family/Caregiver Present Yes  (wife)   Cognition   Overall Cognitive Status WFL   Arousal/Participation Cooperative   Attention Within functional limits   Orientation Level Oriented X4   Following Commands Follows all commands and directions without difficulty   Subjective   Subjective Pt is surprised he could walk already   RLE Assessment   RLE Assessment WFL  (strength hip: 3+/5; knee, ankle WNLs )   LLE Assessment   LLE Assessment WFL  (strength: WNLs)   Bed Mobility   Supine to Sit 5  Supervision   Additional Comments to chair   Transfers   Sit to Stand 4  Minimal assistance   Additional items Verbal cues   Stand to Sit 5  Supervision   Additional items Verbal cues   Ambulation/Elevation   Gait pattern Decreased R stance; Step to   Gait Assistance 6  Modified independent   Additional items Verbal cues   Assistive Device Rolling walker   Distance 250 feet x 2   Stair Management Assistance 5  Supervision   Additional items Verbal cues   Stair Management Technique One rail R;With cane; Step to pattern   Number of Stairs 8   Ambulation/Elevation Additional Comments 4 steps x 2 repetitions   Balance   Static Sitting Good   Dynamic Sitting Fair +   Static Standing Fair +  (wtih RW)   Dynamic Standing Fair  (wtih RW)   Ambulatory Fair  (with RW)   Activity Tolerance   Activity Tolerance Patient limited by pain; Patient tolerated treatment well   Medical Staff Made Aware yes: Dr Reggie Charles yes: Wesley Silverman   Assessment   Prognosis Good   Problem List Decreased strength; Impaired balance;Decreased mobility; Decreased skin integrity;Pain   Assessment Patient seen for Physical Therapy evaluation  Patient admitted with Status post total replacement of right hip  Comorbidities affecting patient's physical performance include: DM, Gout  Personal factors affecting patient at time of initial evaluation include: lives in single story house, stairs to enter home, inability to navigate community distances, inability to navigate level surfaces without external assistance, inability to perform current job functions, inability to perform ADLS and inability to perform IADLS   Prior to admission, patient was independent with functional mobility without assistive device, independent with ADLS, independent with IADLS, living with wife in a single level home with 3 steps to enter, ambulating household distance, ambulating community distances and works full time  Please find objective findings from Physical Therapy assessment regarding body systems outlined above with impairments and limitations including weakness, impaired balance, decreased endurance, gait deviations, pain, decreased activity tolerance, decreased functional mobility tolerance, fall risk and decreased skin integrity  The Barthel Index was used as a functional outcome tool presenting with a score of Barthel Index Score: 60 today indicating moderate limitations of functional mobility and ADLS    Patient's clinical presentation is currently evolving as seen in patient's presentation of changing level of pain, increased fall risk, new onset of impairment of functional mobility and new onset of weakness  Pt would benefit from continued Physical Therapy treatment to address deficits as defined above and maximize level of functional mobility  As demonstrated by objective findings, the assigned level of complexity for this evaluation is moderate  The patient's AM-PAC Basic Mobility Inpatient Short Form Raw Score is 22  A Raw score of greater than 16 suggests the patient may benefit from discharge to home  Please also refer to the recommendation of the Physical Therapist for safe discharge planning  Goals   Patient Goals to go home   STG Expiration Date 08/02/22   Short Term Goal #1 Independent with all transfers supine <> short sit and sit <> stand with good balance   Short Term Goal #2 Indep amb  with RW for household distances with good balance; Indep navigating at least 3 stairs to allow pt to return home and enter/exit his home  LTG Expiration Date 08/09/22   Long Term Goal #1 Indep with amb  with RW for community distances to allow pt to return home and attend OP PT   Plan   Treatment/Interventions Functional transfer training;LE strengthening/ROM; Elevations; Therapeutic exercise; Endurance training;Patient/family training;Equipment eval/education; Bed mobility;Gait training;Spoke to MD;Spoke to nursing;Spoke to case management;OT;Family   PT Frequency Twice a day   Recommendation   PT Discharge Recommendation Home with outpatient rehabilitation   Equipment Recommended [place] walker   Additional Comments Rolling walker issued and adjusted for proper height     AM-PAC Basic Mobility Inpatient   Turning in Bed Without Bedrails 3   Lying on Back to Sitting on Edge of Flat Bed 3   Moving Bed to Chair 4   Standing Up From Chair 4   Walk in Room 4   Climb 3-5 Stairs 4   Basic Mobility Inpatient Raw Score 22   Basic Mobility Standardized Score 47 4   Highest Level Of Mobility   [facility] Goal 7: Walk 25 feet or more   JH-HLM Achieved 8: Walk 250 feet ot more   Barthel Index   Feeding 10   Bathing 0   Grooming Score 5   Dressing Score 5   Bladder Score 10   Bowels Score 10   Toilet Use Score 5   Transfers (Bed/Chair) Score 10   Mobility (Level Surface) Score 15   Stairs Score 10   Barthel Index Score 80   Additional Treatment Session   Start Time 1320   End Time 1340   Treatment Assessment Pt instructed on use of RW to achieve WBAT RLE  Pt stood at [facility] and completed standing right hip flexion, weight shifting right and left and mini marches  Pt able to progress to ambulation with RW for commnity distances with modified independence for 250 feet  to stairs; after brief seated rest and demo of navigating stairs by PT, pt able to navigate 4 stairs with one railing and a cane x 2 reps  Totaling 8 stairs  Pt then ambulated 250 feet back to room with modified independence  Pt positioned in chair  with lunch tray in front  Legs elevated  A: Pt tolerated and progressed well  Expect pt to progress well at home and in OP PT  All  questions answered regarding mobility and rehab  P: Cont  as per plan  Plan for d/c home today with RW and OP PT later this week  Equipment Use rolling walker   End of Consult   Patient Position at End of Consult Bedside chair; All needs within reach   Licensure   [address] Number  Fransiscotaina Shakira Santos Massed PT  [id number]

## 2022-07-26 NOTE — H&P
Assessment/Plan:  1  Primary osteoarthritis of one hip, right  Comprehensive metabolic panel     Hemoglobin A1C W/EAG Estimation     CBC and differential     Protime-INR     APTT     Ambulatory referral to Family Practice     Ambulatory referral to Physical Therapy     Case request operating room: ARTHROPLASTY HIP TOTAL     EKG 12 lead     ascorbic acid (VITAMIN C) 500 MG tablet     ferrous sulfate 324 (65 Fe) mg     folic acid (FOLVITE) 1 mg tablet     Multiple Vitamins-Minerals (multivitamin with minerals) tablet     Comprehensive metabolic panel     CBC and differential     Protime-INR     APTT     Case request operating room: ARTHROPLASTY HIP TOTAL     PAT Covid Screening     MRSA culture     MRSA culture     meloxicam (Mobic) 15 mg tablet     DISCONTINUED: meloxicam (Mobic) 15 mg tablet   2  Chronic right hip pain      3  Chronic pain of right knee  meloxicam (Mobic) 15 mg tablet     DISCONTINUED: meloxicam (Mobic) 15 mg tablet   4  Pain in right hip  meloxicam (Mobic) 15 mg tablet     DISCONTINUED: meloxicam (Mobic) 15 mg tablet      Please see below the last office encounter to serve as today's H& P  There been no changes in the patient's overall medical health or orthopedic exam since last evaluation  The patient denies any recent fever, chills, nausea, vomiting, headache, chest pain, trouble breathing  The patient has been seen and cleared by his medical subspecialist in preparation for the procedure today  The patient will be a good candidate for aspirin postoperatively for DVT prophylaxis  He will be a good candidate for outpatient physical therapy following his discharge  All questions were addressed this morning  Proceed to OR for direct anterior right total hip arthroplasty  Vital signs will be reviewed prior to the case      65 yo male with debilitating chronic right hip pain secondary to osteoarthritis   He obtained only a few weeks of relief from prior steroid injection and would like to proceed with right total hip replacement at this time  Due to his age, discussed that there is a chance he will need revision surgery in the future due to normal wear of prosthesis over time  Recommend weight bearing to tolerance right lower extremity, continue Meloxicam for pain control up until the time of surgery  The pre paxton and postoperative expectations surrounding direct anterior right total hip arthroplasty were discussed with the patient today  Consents were signed and placed in chart  Please see risk discussion below  He will need clearance from his PCP and I will defer to the primary care provider if cardiac clearance needed prior to his procedure  Patient denies a history of DVT/PE, GI bleed, peptic ulcer disease, cancer, MRSA infection, turmeric use, smoking, prior bariatric surgery  He will be a good candidate for outpatient physical therapy following his discharge from the hospital   Due to his recent right hip intra-articular steroid injection he signed eligible for surgery until after 6/10/22  All of his questions were addressed today and he was introduced to our surgical scheduler for scheduling of his procedure        Subjective:  Right hip pain     Patient ID: Carlos Cordova is a 64 y o  male  Chronic right hip pain been present for years  Pain mostly located right groin region sharp occurs daily worse with activities such as walking, exercising, rising from seated position or getting into car  The steroid injection to his right hip helped for a few weeks but then the pain returned  He has also been taking meloxicam without full relief of his pain which continues to limit his activities of daily living  He has a history of hypertension on lisinopril, no blood thinners   Never had surgery on this leg in the past   His activity-related pain continues to affect his overall quality of life and is here to discuss further treatment options      Review of Systems   Constitutional: Negative for chills and fever  HENT: Negative for ear pain, sinus pain and sore throat  Eyes: Negative for pain and visual disturbance  Respiratory: Negative for cough and shortness of breath  Cardiovascular: Negative for chest pain  Gastrointestinal: Negative for abdominal pain, nausea and vomiting  Endocrine: Negative for cold intolerance and heat intolerance  Genitourinary: Negative for difficulty urinating and dysuria  Skin: Negative for rash and wound  Allergic/Immunologic: Negative for environmental allergies and food allergies  Neurological: Negative for weakness, numbness and headaches  Hematological: Does not bruise/bleed easily             Medical History        Past Medical History:   Diagnosis Date    Diabetes mellitus ([facility] [address] )       type 2    Gout      Hypercholesteremia              Surgical History         Past Surgical History:   Procedure Laterality Date    APPENDECTOMY        COLONOSCOPY N/A 2016     Procedure: COLONOSCOPY;  Surgeon: Hemalatha Perera MD;  Location: [facility] GI LAB; Service:     [facility] INJECTION RIGHT HIP (NON ARTHROGRAM)   3/10/2022    [place] ARTHROCENTESIS ASPIR&/INJ MAJOR JT/BURSA W/O US Right 3/10/2022     Procedure: Hip intra-articular cortisone injection ( [id number] );   Surgeon: Jesi Valladares MD;  Location: [facility] MAIN OR;  Service: Pain Management                   Family History   Problem Relation Age of Onset    Diabetes Mother              Mavis Cousin Diabetes Father              Mavis Cousin Cancer Father      Diabetes Sister      Colon cancer Family           Social History            Occupational History    Not on file   Tobacco Use    Smoking status: Former Smoker       Packs/day: 2 00       Years: 15 00       Pack years: 30 00       Types: Cigarettes       Start date: 1980       Quit date: 2008       Years since quittin 6    Smokeless tobacco: Never Used    Tobacco comment: quit 10 years ago   Vaping Use    Vaping Use: Never used   Substance and Sexual Activity    Alcohol use:  Yes       Alcohol/week: 15 0 standard drinks       Types: 5 Cans of beer, 10 Standard drinks or equivalent per week       Comment: beer two to three times per week Elroy Axon     Drug use: No    Sexual activity: Yes       Partners: Female       Birth control/protection: Male Sterilization            Current Outpatient Medications:     allopurinol (ZYLOPRIM) 300 mg tablet, Take 1 tablet (300 mg total) by mouth daily, Disp: 90 tablet, Rfl: 2    ascorbic acid (VITAMIN C) 500 MG tablet, Take 1 tablet (500 mg total) by mouth 2 (two) times a day, Disp: 180 tablet, Rfl: 0    ferrous sulfate 324 (65 Fe) mg, Take 1 tablet (324 mg total) by mouth 2 (two) times a day before meals, Disp: 60 tablet, Rfl: 2    folic acid (FOLVITE) 1 mg tablet, Take 1 tablet (1 mg total) by mouth daily, Disp: 30 tablet, Rfl: 2    Garlic 289 MG CAPS, , Disp: , Rfl:     lisinopril (ZESTRIL) 2 5 mg tablet, Take 1 tablet (2 5 mg total) by mouth daily, Disp: 90 tablet, Rfl: 2    meloxicam (Mobic) 15 mg tablet, Take 1 tablet (15 mg total) by mouth daily, Disp: 30 tablet, Rfl: 2    Multiple Vitamins-Minerals (multivitamin with minerals) tablet, Take 1 tablet by mouth daily, Disp: 90 tablet, Rfl: 0    Na Sulfate-K Sulfate-Mg Sulf (Suprep Bowel Prep Kit) 17 5-3 13-1 6 GM/177ML SOLN, Take 1 kit by mouth once for 1 dose, Disp: 354 mL, Rfl: 0    simvastatin (ZOCOR) 20 mg tablet, Take 1 tablet (20 mg total) by mouth daily, Disp: 90 tablet, Rfl: 2     No Known Allergies     Objective:      Vitals:     04/29/22 0919   BP: 148/80   Pulse: 60         Body mass index is 32 49 kg/m²      Right Hip Exam      Tenderness   The patient is experiencing no tenderness       Range of Motion   Abduction: 30   Adduction: 15   Extension: 0   Flexion: 100   External rotation: 20   Internal rotation: 20      Muscle Strength   The patient has normal right hip strength      Tests   GINGER: positive     Other   Sensation: normal  Pulse: present     Comments:  Painful ROM right hip especially with flexion internal external rotation               Physical Exam  Vitals reviewed  Constitutional:       Appearance: Normal appearance  HENT:      Head: Normocephalic and atraumatic  Eyes:      General: No scleral icterus  Conjunctiva/sclera: Conjunctivae normal    Cardiovascular:      Rate and Rhythm: Normal rate and regular rhythm  Pulses: Normal pulses  Pulmonary:      Effort: Pulmonary effort is normal  No respiratory distress  Breath sounds: No stridor  Musculoskeletal:         General: Normal range of motion  Skin:     General: Skin is warm and dry  Capillary Refill: Capillary refill takes less than 2 seconds  Coloration: Skin is not jaundiced  Neurological:      General: No focal deficit present  Mental Status: He is alert  Psychiatric:         Mood and Affect: Mood normal          Behavior: Behavior normal             I have personally reviewed pertinent films in PACS  Xray AP and lateral right hip shows severe right hip joint space narrowing, subchondral sclerosis, subchondral cysts, osteophyte formation, flattening of superior femoral head    No lytic or blastic lesion      The patient was counseled in detail regarding the diagnosis, the treatment options available, the prognosis of each treatment option, the potential risks and complications   These are, but are not limited to; deep vein thrombosis, pulmonary embolism, neurologic and vascular injury, infection, hematoma, instability, dislocation, loosening, need for amputation, leg length discrepancy, reflex sympathetic dystrophy, persistent and chronic limp, chronic pain, acute pain, heterotopic ossification, stiffness of the hip, ankylosis of the hip, chronic leg swelling, fracture, screw or prosthetic perforation, medical morbidities, death, heart attack, and stroke   The patient's questions were answered in detail   The patient demonstrates understanding of these risks and wishes to proceed with surgery        Shannon LEDEZMA    Division of Adult Reconstruction  Department of Orthopaedic Surgery  [facility]

## 2022-07-26 NOTE — NURSING NOTE
Patient left unit via wheelchair in stable condition with all belongings accompanied by RN and family  AVS and all discharge instructions reviewed thoroughly with patient and were well understood  Verbal order OK given by Dr Luis Hemphill to discharge patient after straight catheterization

## 2022-07-26 NOTE — PROGRESS NOTES
Progress Note - Orthopedics   [facility] 64 [facility] MR[facility]: [id number]  Unit/Bed#: [address] [place] [address] Encounter: [id number]    Assessment:  1) POD#0 s/p DA R MARIN    Plan:  Ancef 2g IV x 1 postop prior to DC today  DVT prophylaxis: ASA 325mg PO BID/SCD's/Ambulation  WBAT RLE  PT/OT- WBAT RLE  Analgesia PRN  Dressing- monitor for drainage- may stay in place 7 days  Discharge planning - patient has done well postoperatively and is on track for same-day discharge after total hip arthroplasty  His pain is controlled  He is ambulating well with physical therapy and is deemed safe for discharge from their perspective  He has received his dose of was then postoperative antibiotics and will be ready for discharge today  All questions addressed at bedside  I will see the baby patient back in 2 weeks time for continued postoperative care  Weight bearing: WBAT RLE    VTE Pharmacologic Prophylaxis: ASA 325mg PO BID  VTE Mechanical Prophylaxis: sequential compression device    Subjective:  Patient seen examined at bedside with wife present  Patient doing well and his pain is controlled  Events of surgery discussed with the patient  Patient eager for discharge home today  Vitals: Blood pressure 139/74, pulse 83, temperature (!) 96 6 °F (35 9 °C), resp  rate 18, height 5' 9" (1 753 m), weight 96 3 kg (212 lb 3 2 oz), SpO2 95 %  ,Body mass index is 31 34 kg/m²        Intake/Output Summary (Last 24 hours) at 7/26/2022 1530  Last data filed at 7/26/2022 1258  Gross per 24 hour   Intake 1800 ml   Output 160 ml   Net 1640 ml       Invasive Devices  Report    Peripheral Intravenous Line  Duration           Peripheral IV 07/26/22 Right Arm <1 day          Drain  Duration           Urethral Catheter -- days                Physical Exam: NAD  Ortho Exam: RLE: Dsg c/d/i, thigh soft, +LFCN SILT, +Fem nerve motor, +DF/PF/EHL, +L3-S1 SILT, DP2+, foot warm    Lab, Imaging and other studies: PO XR R hip:  Reviewed and acceptable    Keith Rizo Rickie LEDEZMA    Division of Adult Reconstruction  Department of Orthopaedic Surgery  [facility]

## 2022-07-28 ENCOUNTER — TELEPHONE (OUTPATIENT)
Dept: OBGYN CLINIC | Facility: HOSPITAL | Age: 57
End: 2022-07-28

## 2022-07-28 NOTE — TELEPHONE ENCOUNTER
Patient contacted for a postoperative follow up assessment  Patient reports 5-8/10 pain when sitting and 8/10 when walking with RW  Patient states I have a  Burning sensation down my leg and pain when I am sitting in my thigh   Patient confirmed post-op PT appointment, 7/29 at 8:45AM       Patient is taking Oxycodone 5 mg every 4 hours,  Tylenol 975mg every 8 hours, Celebrex 100mg BID, ASA 325mg BID, Colace 100mg BID  Patient has not yet had a BM but is passing gas  Patient denies increase in swelling and dressing is clean, dry and intact  Patient is icing the site regularly  Patient denies nausea, vomiting, abdominal pain, chest pain, shortness of breath, fever, dizziness and calf pain   Patient confirmed post-op appointment with surgeon on 8/10 at 9:30AM  Patient does not have any other questions or concerns at this time

## 2022-07-29 ENCOUNTER — OFFICE VISIT (OUTPATIENT)
Dept: PHYSICAL THERAPY | Facility: CLINIC | Age: 57
End: 2022-07-29
Payer: COMMERCIAL

## 2022-07-29 DIAGNOSIS — M16.11 PRIMARY OSTEOARTHRITIS OF RIGHT HIP: Primary | ICD-10-CM

## 2022-07-29 PROCEDURE — 97110 THERAPEUTIC EXERCISES: CPT | Performed by: PHYSICAL THERAPIST

## 2022-07-29 PROCEDURE — 97112 NEUROMUSCULAR REEDUCATION: CPT | Performed by: PHYSICAL THERAPIST

## 2022-07-29 NOTE — PROGRESS NOTES
Daily Note     Today's date: 2022  Patient name: Mark Hankins  : 1965  MRN: [id number]  Referring provider: Lissette Quintero DO  Dx:   Encounter Diagnosis     ICD-10-CM    1  Primary osteoarthritis of right hip  M16 11                   Subjective: My right hip and thigh are very sore, painful to weight bear  Objective: See treatment diary below  TUG 36 sec  Gait Speed   25 m/s w RW      Assessment: Tolerated treatment well  Patient demonstrated fatigue post treatment and exhibited good technique with therapeutic exercises      Plan: Continue per plan of care        Precautions: THR 22      Manuals                                                                 Neuro Re-Ed             Balance WT Shifting lat/diag 2x20                                                                                          Ther Ex             Nustep 10m L1            Standing hip ABD 20x            Standing Heel/Toe lift 20x            Standing knee flex 20x            Quad/Glut sets 15x10s            Heel slide AAROM  20x            Hip ABD supine AAROM 20x                         Ther Activity                                       Gait Training             Step to w RW perf                         Modalities             Cp perf

## 2022-08-01 ENCOUNTER — TELEPHONE (OUTPATIENT)
Dept: OBGYN CLINIC | Facility: CLINIC | Age: 57
End: 2022-08-01

## 2022-08-01 DIAGNOSIS — Z96.641 STATUS POST TOTAL REPLACEMENT OF RIGHT HIP: Primary | ICD-10-CM

## 2022-08-01 RX ORDER — TRAMADOL HYDROCHLORIDE 50 MG/1
TABLET ORAL
Qty: 50 TABLET | Refills: 0 | Status: SHIPPED | OUTPATIENT
Start: 2022-08-01 | End: 2022-08-10 | Stop reason: ALTCHOICE

## 2022-08-01 NOTE — TELEPHONE ENCOUNTER
I did discontinue his oxycodone for him  I prescribed tramadol to take 1-2 tablets every 6-8 hours as needed for pain  He should not take oxycodone in addition to the tramadol  Additionally, if he is still constipated, he can try over-the-counter Senokot or MiraLax in addition to the Colace  The numbness on the right thigh is completely normal at this stage postoperatively should continue to improve    Thanks <<-----Click here for Discharge Medication Review

## 2022-08-02 ENCOUNTER — OFFICE VISIT (OUTPATIENT)
Dept: PHYSICAL THERAPY | Facility: CLINIC | Age: 57
End: 2022-08-02
Payer: COMMERCIAL

## 2022-08-02 DIAGNOSIS — M16.11 PRIMARY OSTEOARTHRITIS OF RIGHT HIP: Primary | ICD-10-CM

## 2022-08-02 PROCEDURE — 97112 NEUROMUSCULAR REEDUCATION: CPT

## 2022-08-02 PROCEDURE — 97110 THERAPEUTIC EXERCISES: CPT

## 2022-08-02 NOTE — PROGRESS NOTES
Daily Note     Today's date: 2022  Patient name: Vivian Reyes  : 1965  MRN: [id number]  Referring provider: Salvador Garcia DO  Dx:   Encounter Diagnosis     ICD-10-CM    1  Primary osteoarthritis of right hip  M16 11                   Subjective: My hip doesn't hurt as much today & I've been doing the exercises at home  My wife has been putting on my socks  Objective: See treatment diary below      Assessment: Tolerated treatment fair  Patient demonstrated fatigue post treatment, exhibited good technique with therapeutic exercises and would benefit from continued PT     Patient arrives ambulating using RW  Plan: Progress treatment as tolerated         Precautions: THR 22      Manuals               R hip PROM/stretching (as tolerated)                                                   Neuro Re-Ed             Balance WT Shifting lat/diag 2x20 ----                                                               Bridging   10x each w/ add / RTB abd           SLR   AAROM 10 x            Ther Ex             Nustep 10m L1 10 min L1           Standing hip ABD 20x HEP           Standing Heel/Toe lift 20x 20x HR           Standing knee flex 20x HEP           Quad/Glut sets 15x10s HEP           Heel slide AAROM  20x 10x red PB            Hip ABD supine AAROM 20x HEP           LSU/FSU  4" step 10x each            Ther Activity             STS   20" mat 2 x 10 reps                         Gait Training             Step to w RW perf ---                        Modalities             CP perf Supine x 10 min

## 2022-08-05 ENCOUNTER — OFFICE VISIT (OUTPATIENT)
Dept: PHYSICAL THERAPY | Facility: CLINIC | Age: 57
End: 2022-08-05
Payer: COMMERCIAL

## 2022-08-05 DIAGNOSIS — M16.11 PRIMARY OSTEOARTHRITIS OF RIGHT HIP: Primary | ICD-10-CM

## 2022-08-05 PROCEDURE — 97110 THERAPEUTIC EXERCISES: CPT | Performed by: PHYSICAL THERAPIST

## 2022-08-05 PROCEDURE — 97112 NEUROMUSCULAR REEDUCATION: CPT | Performed by: PHYSICAL THERAPIST

## 2022-08-05 NOTE — PROGRESS NOTES
Daily Note     Today's date: 2022  Patient name: Kirit Lynn  : 1965  MRN: [id number]  Referring provider: Helen Mcdowell DO  Dx:   Encounter Diagnosis     ICD-10-CM    1  Primary osteoarthritis of right hip  M16 11                   Subjective: I am using a cane  Objective: See treatment diary below      Assessment: Tolerated treatment well  Patient demonstrated fatigue post treatment and exhibited good technique with therapeutic exercises      Plan: Continue per plan of care        Precautions: THR 22      Manuals              R hip PROM/stretching (as tolerated)                                                   Neuro Re-Ed             Balance WT Shifting lat/diag 2x20 ---- 2x20          [place] walkouts   #10 2x10                                                 Bridging   10x each w/ add / RTB abd 15x          SLR   AAROM 10 x  15x          Ther Ex             Nustep 10m L1 10 min L1 15m          Standing hip ABD 20x HEP 20x          Standing Heel/Toe lift 20x 20x HR 20x          Standing knee flex 20x HEP 20x          Quad/Glut sets 15x10s HEP           Heel slide AAROM  20x 10x red PB  10x          Hip ABD supine AAROM 20x HEP 20x          LSU/FSU  4" step 10x each            Ther Activity             STS   20" mat 2 x 10 reps            Resisted lunge   Gr TB 20x          Gait Training             Step to w RW perf --- cane                       Modalities             CP perf Supine x 10 min

## 2022-08-08 ENCOUNTER — OFFICE VISIT (OUTPATIENT)
Dept: PHYSICAL THERAPY | Facility: CLINIC | Age: 57
End: 2022-08-08
Payer: COMMERCIAL

## 2022-08-08 DIAGNOSIS — M16.11 PRIMARY OSTEOARTHRITIS OF RIGHT HIP: Primary | ICD-10-CM

## 2022-08-08 PROCEDURE — 97110 THERAPEUTIC EXERCISES: CPT

## 2022-08-08 PROCEDURE — 97112 NEUROMUSCULAR REEDUCATION: CPT

## 2022-08-08 NOTE — PROGRESS NOTES
Daily Note     Today's date: 2022  Patient name: Ilya Roman  : 1965  MRN: [id number]  Referring provider: Lissette Vang DO  Dx:   Encounter Diagnosis     ICD-10-CM    1  Primary osteoarthritis of right hip  M16 11                   Subjective: "I drove here today " Patient reports compliance with HEP  Objective: See treatment diary below      Assessment: Tolerated treatment fair  Patient demonstrated fatigue post treatment, exhibited good technique with therapeutic exercises and would benefit from continued PT  Patient arrives ambulating using SPC  Plan: Progress treatment as tolerated         Precautions: THR 22      Manuals             R hip PROM/stretching (as tolerated)   AD                                                Neuro Re-Ed             Balance WT Shifting lat/diag 2x20 ---- 2x20 ----         [place] walkouts   #10 2x10 @#10 - 3 x 10 reps                                                 Bridging   10x each w/ add / RTB abd 15x 10x each w/ add / RTB abd         SLR   AAROM 10 x  15x 15x ; 2 x 10 reps          Ther Ex             Nustep 10m L1 10 min L1 15m 12 min L3         Standing hip ABD 20x HEP 20x -----         Standing Heel/Toe lift 20x 20x HR 20x 20x HR on  roll          Standing knee flex 20x HEP 20x ----         Quad/Glut sets 15x10s HEP  ----         Heel slide AAROM  20x 10x red PB  10x R LE x 10 reps          Hip ABD supine AAROM 20x HEP 20x ----         LSU/FSU  4" step 10x each   6" step x 10 reps          Ther Activity             STS   20" mat 2 x 10 reps   19" mat 2 x 10 reps          Resisted lunge   Gr TB 20x -------         Gait Training             Step to w RW perf --- cane --------                      Modalities             CP perf Supine x 10 min  Supine 10 min

## 2022-08-10 ENCOUNTER — APPOINTMENT (OUTPATIENT)
Dept: RADIOLOGY | Facility: CLINIC | Age: 57
End: 2022-08-10
Payer: COMMERCIAL

## 2022-08-10 ENCOUNTER — OFFICE VISIT (OUTPATIENT)
Dept: OBGYN CLINIC | Facility: CLINIC | Age: 57
End: 2022-08-10

## 2022-08-10 VITALS
SYSTOLIC BLOOD PRESSURE: 140 MMHG | WEIGHT: 211 LBS | BODY MASS INDEX: 31.25 KG/M2 | HEART RATE: 68 BPM | DIASTOLIC BLOOD PRESSURE: 78 MMHG | HEIGHT: 69 IN

## 2022-08-10 DIAGNOSIS — Z96.641 STATUS POST TOTAL REPLACEMENT OF RIGHT HIP: ICD-10-CM

## 2022-08-10 DIAGNOSIS — Z96.641 AFTERCARE FOLLOWING RIGHT HIP JOINT REPLACEMENT SURGERY: ICD-10-CM

## 2022-08-10 DIAGNOSIS — Z96.641 STATUS POST TOTAL REPLACEMENT OF RIGHT HIP: Primary | ICD-10-CM

## 2022-08-10 DIAGNOSIS — M76.31 IT BAND SYNDROME, RIGHT: ICD-10-CM

## 2022-08-10 DIAGNOSIS — Z47.1 AFTERCARE FOLLOWING RIGHT HIP JOINT REPLACEMENT SURGERY: ICD-10-CM

## 2022-08-10 PROCEDURE — 99024 POSTOP FOLLOW-UP VISIT: CPT | Performed by: ORTHOPAEDIC SURGERY

## 2022-08-10 PROCEDURE — 73502 X-RAY EXAM HIP UNI 2-3 VIEWS: CPT

## 2022-08-10 NOTE — PROGRESS NOTES
Assessment/Plan:  1  Status post total replacement of right hip  XR hip/pelv 2-3 vws right if performed   2  Aftercare following right hip joint replacement surgery     3  It band syndrome, right       Scribe Attestation    I,:  Odette Robertson PA-C am acting as a scribe while in the presence of the attending physician :       I,:  Esequiel Serna DO personally performed the services described in this documentation    as scribed in my presence :         Salome Ontiveros is a pleasant 49-year-old gentleman presenting today for follow-up 2 weeks after a direct anterior right total hip arthroplasty was same-day discharge  He is doing exceptionally well in his recovery  The prosthesis is stable on imaging and exam   His incision is healing nicely with no sign of infection  He may now get it wet in the shower, but should avoid direct scrubbing or saturation  He will continue with aspirin for DVT prophylaxis for the next 4 weeks  Since he is no longer taking narcotics, it is reasonable for him to return to driving after next week  We have encouraged him to continue his efforts with physical therapy and his home exercises  He does seem to symptoms consistent with distal IT band syndrome, and we would like this addressed by physical therapy  He can return in 4 weeks for a clinical re-evaluation  He expressed understanding all his questions were addressed    Subjective: 2 weeks s/p direct anterior right MARIN    Patient ID: Balbina Marina is a 64 y o  male  Salome Ontiveros is a pleasant 49-year-old gentleman presenting today for follow-up the aforementioned surgery  He reports that he is doing very well  He was pleased with this same day discharge it did not have any issues at home  He has been able to stop taking the narcotics and is only taking Tylenol  He has finished his Celebrex  He is also taking his aspirin for DVT prophylaxis regularly    He has made excellent gains with physical therapy and his home exercise program   He does have some discomfort in the lateral hip radiating towards the knee  He denies any distal paresthesias but does have paresthesias around the incision  He has been using a cane to ambulate      Review of Systems   Constitutional: Negative  HENT: Negative  Eyes: Negative  Respiratory: Negative  Cardiovascular: Negative  Gastrointestinal: Negative  Endocrine: Negative  Genitourinary: Negative  Musculoskeletal: Positive for myalgias  Skin: Negative  Allergic/Immunologic: Negative  Neurological: Positive for numbness  Hematological: Negative  Psychiatric/Behavioral: Negative  Past Medical History:   Diagnosis Date    Diabetes mellitus ([address] )     type 2 diet controlled    Gout     Hypercholesteremia     Hypertension      Past Surgical History:   Procedure Laterality Date    APPENDECTOMY      ARTHROPLASTY HIP TOTAL ANTERIOR Right 2022    Procedure: ARTHROPLASTY HIP TOTAL ANTERIOR, NAVIGATED;  Surgeon: Rebeka Schuster DO;  Location: [address] [place] [address];  Service: Orthopedics    COLONOSCOPY N/A 2016    Procedure: COLONOSCOPY;  Surgeon: Jaun Palomares MD;  Location: [facility]; Service:    [facility] INJECTION RIGHT HIP (NON ARTHROGRAM)  3/10/2022    DC ARTHROCENTESIS ASPIR&/INJ MAJOR JT/BURSA W/O US Right 3/10/2022    Procedure: Hip intra-articular cortisone injection ( [id number] );   Surgeon: Sri Ramírez MD;  Location: [facility] MAIN OR;  Service: Pain Management        Family History   Problem Relation Age of Onset    Diabetes Mother             Diabetes Father            Lazara Jones Cancer Father     Diabetes Sister     Colon cancer Family        Social History     Occupational History    Not on file   Tobacco Use    Smoking status: Former Smoker     Packs/day: 2 00     Years: 15 00     Pack years: 30 00     Types: Cigarettes     Start date: 1980     Quit date: 2008     Years since quittin 9    Smokeless tobacco: Never Used    Tobacco comment: quit 10 years ago   Vaping Use    Vaping Use: Never used   Substance and Sexual Activity    Alcohol use: Yes     Alcohol/week: 15 0 standard drinks     Types: 5 Cans of beer, 10 Standard drinks or equivalent per week     Comment: beer two to three times per week Saira Connorsfeng     Drug use: No    Sexual activity: Yes     Partners: Female     Birth control/protection: Male Sterilization         Current Outpatient Medications:     acetaminophen (TYLENOL) 325 mg tablet, Take 3 tablets (975 mg total) by mouth every 8 (eight) hours, Disp: , Rfl: 0    aspirin 325 mg tablet, Take 1 tablet (325 mg total) by mouth 2 (two) times a day for 84 doses, Disp: 84 tablet, Rfl: 0    lisinopril (ZESTRIL) 2 5 mg tablet, Take 1 tablet (2 5 mg total) by mouth daily, Disp: 90 tablet, Rfl: 2    simvastatin (ZOCOR) 20 mg tablet, Take 1 tablet (20 mg total) by mouth daily, Disp: 90 tablet, Rfl: 2    allopurinol (ZYLOPRIM) 300 mg tablet, Take 1 tablet (300 mg total) by mouth daily, Disp: 90 tablet, Rfl: 2    Multiple Vitamins-Minerals (multivitamin with minerals) tablet, Take 1 tablet by mouth daily, Disp: 90 tablet, Rfl: 0    No Known Allergies    Objective:  Vitals:    08/10/22 0929   BP: 140/78   Pulse: 68       Body mass index is 31 16 kg/m²  Right Hip Exam     Tenderness   The patient is experiencing tenderness in the lateral (IT band)  Range of Motion   Abduction:  45 normal   Adduction:  30 normal   Extension:  0 normal   Flexion:  120 normal   External rotation: normal Right hip external rotation: 45  Internal rotation:  20 normal     Muscle Strength   Abduction: 5/5   Adduction: 5/5   Flexion: 5/5     Tests   GINGER: negative  Neo: negative    Other   Erythema: absent  Scars: present  Sensation: normal  Pulse: present    Comments:  Incision well approximated and healing well   No erythema, ecchymosis, warmth, drainage, or dehiscence  Thigh and calf soft and nontender  Grossly NVI  Tender distal IT band  Ambulates with (0) independent slightly antalgic gait on right with cane              Physical Exam  Vitals and nursing note reviewed  Constitutional:       Appearance: Normal appearance  He is well-developed  Comments: Body mass index is 31 16 kg/m²  HENT:      Head: Normocephalic and atraumatic  Right Ear: External ear normal       Left Ear: External ear normal    Eyes:      Extraocular Movements: Extraocular movements intact  Conjunctiva/sclera: Conjunctivae normal    Cardiovascular:      Rate and Rhythm: Normal rate  Pulses: Normal pulses  Pulmonary:      Effort: Pulmonary effort is normal    Abdominal:      Palpations: Abdomen is soft  Musculoskeletal:      Cervical back: Normal range of motion  Comments: See ortho exam   Skin:     General: Skin is warm and dry  Neurological:      General: No focal deficit present  Mental Status: He is alert and oriented to person, place, and time  Mental status is at baseline  Psychiatric:         Mood and Affect: Mood normal          Behavior: Behavior normal          Thought Content: Thought content normal          Judgment: Judgment normal          I have personally reviewed pertinent films in PACS of the x-rays taken today of his pelvis and right hip which demonstrate a total hip prosthesis that is well aligned and well fixed  There is no signs of loosening or periprosthetic fracture    The acetabular component appears well seated with appropriate version on the cross-table lateral

## 2022-08-11 ENCOUNTER — OFFICE VISIT (OUTPATIENT)
Dept: PHYSICAL THERAPY | Facility: CLINIC | Age: 57
End: 2022-08-11
Payer: COMMERCIAL

## 2022-08-11 DIAGNOSIS — M16.11 PRIMARY OSTEOARTHRITIS OF RIGHT HIP: Primary | ICD-10-CM

## 2022-08-11 PROCEDURE — 97112 NEUROMUSCULAR REEDUCATION: CPT

## 2022-08-11 PROCEDURE — 97110 THERAPEUTIC EXERCISES: CPT

## 2022-08-11 NOTE — PROGRESS NOTES
Daily Note     Today's date: 2022  Patient name: Kirit Lynn  : 1965  MRN: [id number]  Referring provider: Helen Mcdowell DO  Dx:   Encounter Diagnosis     ICD-10-CM    1  Primary osteoarthritis of right hip  M16 11        Start Time:           Subjective: I saw the Dr & he thinks I have ITB syndrome  Objective: See treatment diary below      Assessment: Tolerated treatment fair  Patient demonstrated fatigue post treatment, exhibited good technique with therapeutic exercises and would benefit from continued PT  Patient arrives ambulating using SPC  Plan: Progress treatment as tolerated         Precautions: THR 22      Manuals    FOTO          R hip PROM/stretching (as tolerated)   AD AD             ITB STM/stretching                                   Neuro Re-Ed             Balance WT Shifting lat/diag 2x20 ---- 2x20 ------ ------        [place] walkouts   #10 2x10 @#10 - 3 x 10 reps  @#10 - 3 x 10 reps                                                Bridging   10x each w/ add / RTB abd 15x 10x each w/ add / RTB abd 2x10 reps         SLR   AAROM 10 x  15x 15x ; 2 x 10 reps  3x10 reps        Ther Ex             Nustep 10m L1 10 min L1 15m 12 min L3 10 min L3        Standing hip ABD 20x HEP 20x ----- ----        Standing Heel/Toe lift 20x 20x HR 20x 20x HR on 1/ roll  20x HR on /2 roll         Standing knee flex 20x HEP 20x ---- -----        Quad/Glut sets 15x10s HEP  ---- ------        Heel slide AAROM  20x 10x red PB  10x R LE x 10 reps  ------        Hip ABD supine AAROM 20x HEP 20x ---- ------        LSU/FSU  4" step 10x each   6" step x 10 reps  6" step x 10 reps FSU         Ther Activity             STS   20" mat 2 x 10 reps   19" mat 2 x 10 reps  19" mat 2 x 10 reps         Resisted lunge   Gr TB 20x ------- -------        Gait Training             Step to w RW perf --- cane -------- -------                     Modalities             CP perf Supine x 10 min Supine 10 min  L S/L 10 min

## 2022-08-16 ENCOUNTER — OFFICE VISIT (OUTPATIENT)
Dept: PHYSICAL THERAPY | Facility: CLINIC | Age: 57
End: 2022-08-16
Payer: COMMERCIAL

## 2022-08-16 DIAGNOSIS — M16.11 PRIMARY OSTEOARTHRITIS OF RIGHT HIP: Primary | ICD-10-CM

## 2022-08-16 PROCEDURE — 97110 THERAPEUTIC EXERCISES: CPT

## 2022-08-16 PROCEDURE — 97112 NEUROMUSCULAR REEDUCATION: CPT

## 2022-08-16 NOTE — PROGRESS NOTES
Daily Note     Today's date: 2022  Patient name: Bailey Abbasi  : 1965  MRN: [id number]  Referring provider: Radha Robles DO  Dx:   Encounter Diagnosis     ICD-10-CM    1  Primary osteoarthritis of right hip  M16 11        Start Time: 1612          Subjective: Patient reports lateral R knee area pain & numbness along lateral R thigh  Objective: See treatment diary below      Assessment: Tolerated treatment well  Patient demonstrated fatigue post treatment, exhibited good technique with therapeutic exercises and would benefit from continued PT      Plan: Progress treatment as tolerated         Precautions: THR 22      Manuals    FOTO          R hip PROM/stretching (as tolerated)   AD AD AD            ITB STM/stretching  AD                                 Neuro Re-Ed             Balance WT Shifting lat/diag 2x20 ---- 2x20 ------ ------        [place] walkouts   #10 2x10 @#10 - 3 x 10 reps  @#10 - 3 x 10 reps  @#10 - 3 x 10 reps                                              Bridging   10x each w/ add / RTB abd 15x 10x each w/ add / RTB abd 2x10 reps  2x10 reps        SLR   AAROM 10 x  15x 15x ; 2 x 10 reps  3x10 reps 3x10 reps        Ther Ex             Nustep 10m L1 10 min L1 15m 12 min L3 10 min L3 10 min L3       Standing hip ABD 20x HEP 20x ----- ---- ---       Standing Heel/Toe lift 20x 20x HR 20x 20x HR on  roll  20x HR on  roll  20x on  roll        Standing knee flex 20x HEP 20x ---- ----- ---       Quad/Glut sets 15x10s HEP  ---- ------ ---       Heel slide AAROM  20x 10x red PB  10x R LE x 10 reps  ------ ---       Hip ABD supine AAROM 20x HEP 20x ---- ------ ---       LSU/FSU  4" step 10x each   6" step x 10 reps  6" step x 10 reps FSU  6" step x 20 reps FSU        Ther Activity             STS   20" mat 2 x 10 reps   19" mat 2 x 10 reps  19" mat 2 x 10 reps  19" mat 2 x 10 reps        Resisted lunge   Gr TB 20x ------- ------- ---       Gait Training Step to w RW perf --- cane -------- ------- ----                    Modalities             CP perf Supine x 10 min  Supine 10 min  L S/L 10 min  ------

## 2022-08-18 ENCOUNTER — OFFICE VISIT (OUTPATIENT)
Dept: PHYSICAL THERAPY | Facility: CLINIC | Age: 57
End: 2022-08-18
Payer: COMMERCIAL

## 2022-08-18 DIAGNOSIS — M16.11 PRIMARY OSTEOARTHRITIS OF RIGHT HIP: Primary | ICD-10-CM

## 2022-08-18 PROCEDURE — 97112 NEUROMUSCULAR REEDUCATION: CPT

## 2022-08-18 PROCEDURE — 97110 THERAPEUTIC EXERCISES: CPT

## 2022-08-18 NOTE — PROGRESS NOTES
Daily Note     Today's date: 2022  Patient name: Carlos Cordova  : 1965  MRN: [id number]  Referring provider: Peggy Cobb DO  Dx:   Encounter Diagnosis     ICD-10-CM    1  Primary osteoarthritis of right hip  M16 11        Start Time: 161  Stop Time: 1710  Total time in clinic (min): 54 minutes    Subjective: R lateral knee pain doesn't come on until about 8 pm now  Objective: See treatment diary below      Assessment: Tolerated treatment fair  Patient demonstrated fatigue post treatment, exhibited good technique with therapeutic exercises and would benefit from continued PT  Decreased R ITB tightness noted  Plan: Progress treatment as tolerated         Precautions: THR 22      Manuals    FOTO         R hip PROM/stretching (as tolerated)   AD AD AD AD           ITB STM AD AD                                Neuro Re-Ed             Balance WT Shifting lat/diag 2x20 ---- 2x20 ------ ------        [place] walkouts   #10 2x10 @#10 - 3 x 10 reps  @#10 - 3 x 10 reps  @#10 - 3 x 10 reps @#10 - 3 x 10 reps                                             Bridging   10x each w/ add / RTB abd 15x 10x each w/ add / RTB abd 2x10 reps  2x10 reps  2x10 reps      SLR   AAROM 10 x  15x 15x ; 2 x 10 reps  3x10 reps 3x10 reps  3x10 reps       Ther Ex             Nustep 10m L1 10 min L1 15m 12 min L3 10 min L3 10 min L3 10 min L3      Standing hip ABD 20x HEP 20x ----- ---- ---       Standing Heel/Toe lift 20x 20x HR 20x 20x HR on 1/2 roll  20x HR on 1/2 roll  20x on 1/2 roll  20x on 1/2 roll       Standing knee flex 20x HEP 20x ---- ----- ---       Quad/Glut sets 15x10s HEP  ---- ------ ---       Heel slide AAROM  20x 10x red PB  10x R LE x 10 reps  ------ ---       Hip ABD supine AAROM 20x HEP 20x ---- ------ ---       LSU/FSU  4" step 10x each   6" step x 10 reps  6" step x 10 reps FSU  6" step x 20 reps FSU  8" step x 20 reps FSU/LSU      Ther Activity             STS   20" mat 2 x 10 reps   19" mat 2 x 10 reps  19" mat 2 x 10 reps  19" mat 2 x 10 reps  Pillow on regular chair 2 x 10 reps       Resisted lunge   Gr TB 20x ------- ------- --- ----      Gait Training             Step to w RW perf --- cane -------- ------- ---- ---                   Modalities             CP perf Supine x 10 min  Supine 10 min  L S/L 10 min  ------ ---

## 2022-08-23 ENCOUNTER — OFFICE VISIT (OUTPATIENT)
Dept: PHYSICAL THERAPY | Facility: CLINIC | Age: 57
End: 2022-08-23
Payer: COMMERCIAL

## 2022-08-23 DIAGNOSIS — M16.11 PRIMARY OSTEOARTHRITIS OF RIGHT HIP: Primary | ICD-10-CM

## 2022-08-23 PROCEDURE — 97112 NEUROMUSCULAR REEDUCATION: CPT

## 2022-08-23 PROCEDURE — 97110 THERAPEUTIC EXERCISES: CPT

## 2022-08-23 NOTE — PROGRESS NOTES
Daily Note     Today's date: 2022  Patient name: Peg Inman  : 1965  MRN: [id number]  Referring provider: Tammie Harp DO  Dx:   Encounter Diagnosis     ICD-10-CM    1  Primary osteoarthritis of right hip  M16 11        Start Time: 1615          Subjective: R lateral thigh still feels numb, but pain / tightness is decreasing  Objective: See treatment diary below      Assessment: Tolerated treatment well  Patient demonstrated fatigue post treatment, exhibited good technique with therapeutic exercises and would benefit from continued PT      Plan: Progress treatment as tolerated         Precautions: THR 22      Manuals    FOTO        R hip PROM/stretching (as tolerated)   AD AD AD AD AD          ITB STM AD AD AD                               Neuro Re-Ed             Balance WT Shifting lat/diag 2x20 ---- 2x20 ------ ------        [place] walkouts   #10 2x10 @#10 - 3 x 10 reps  @#10 - 3 x 10 reps  @#10 - 3 x 10 reps @#10 - 3 x 10 reps @#10 - 2 x 10 reps (lateral)     elvi L/S stabs walkouts     -- -- -- @#9 - 2 x 10 reps                                Bridging   10x each w/ add / RTB abd 15x 10x each w/ add / RTB abd 2x10 reps  2x10 reps  2x10 reps 2x10 reps     SLR   AAROM 10 x  15x 15x ; 2 x 10 reps  3x10 reps 3x10 reps  3x10 reps  3x10 reps     Ther Ex             Nustep 10m L1 10 min L1 15m 12 min L3 10 min L3 10 min L3 10 min L3 10 min L3     Standing hip ABD 20x HEP 20x ----- ---- ---       Standing Heel/Toe lift 20x 20x HR 20x 20x HR on  roll  20x HR on  roll  20x on  roll  20x on  roll  20x on  roll      Standing knee flex 20x HEP 20x ---- ----- ---  ---     Quad/Glut sets 15x10s HEP  ---- ------ ---  ---     Heel slide AAROM  20x 10x red PB  10x R LE x 10 reps  ------ ---  ---     Hip ABD supine AAROM 20x HEP 20x ---- ------ ---  ----     LSU/FSU  4" step 10x each   6" step x 10 reps  6" step x 10 reps FSU  6" step x 20 reps FSU  8" step x 20 reps FSU/LSU 8" step x 20 reps      Ther Activity             STS   20" mat 2 x 10 reps   19" mat 2 x 10 reps  19" mat 2 x 10 reps  19" mat 2 x 10 reps  Pillow on regular chair 2 x 10 reps  Hi lo table 2x10 reps      Resisted lunge   Gr TB 20x ------- ------- --- ---- ----     Gait Training             Step to w RW perf --- cane -------- ------- ---- ---                   Modalities             CP perf Supine x 10 min  Supine 10 min  L S/L 10 min  ------ --- ----

## 2022-08-25 ENCOUNTER — OFFICE VISIT (OUTPATIENT)
Dept: PHYSICAL THERAPY | Facility: CLINIC | Age: 57
End: 2022-08-25
Payer: COMMERCIAL

## 2022-08-25 DIAGNOSIS — M16.11 PRIMARY OSTEOARTHRITIS OF RIGHT HIP: Primary | ICD-10-CM

## 2022-08-25 PROCEDURE — 97110 THERAPEUTIC EXERCISES: CPT

## 2022-08-25 PROCEDURE — 97112 NEUROMUSCULAR REEDUCATION: CPT

## 2022-08-25 NOTE — PROGRESS NOTES
Daily Note     Today's date: 2022  Patient name: Mark Hankins  : 1965  MRN: [id number]  Referring provider: Lissette Quintero DO  Dx:   Encounter Diagnosis     ICD-10-CM    1  Primary osteoarthritis of right hip  M16 11        Start Time: 1524          Subjective: I do massage my R  Thigh, but it still feels numb  Objective: See treatment diary below      Assessment: Tolerated treatment fair  Patient demonstrated fatigue post treatment, exhibited good technique with therapeutic exercises and would benefit from continued PT      Plan: Progress treatment as tolerated         Precautions: THR 22      Manuals    FOTO       R hip PROM/stretching (as tolerated)   AD AD AD AD AD AD         ITB STM AD AD AD AD                              Neuro Re-Ed             Balance WT Shifting lat/diag 2x20 ---- 2x20 ------ ------        [place] walkouts   #10 2x10 @#10 - 3 x 10 reps  @#10 - 3 x 10 reps  @#10 - 3 x 10 reps @#10 - 3 x 10 reps @#10 - 2 x 10 reps (lateral) @#10 - 2 x 10 reps (lateral)    sreedhar L/S stabs walkouts     -- -- -- @#9 - 2 x 10 reps  @#10 - 2 x 10 reps     S/L clamshells     ---- --- --- --- 2x10 reps                  Bridging   10x each w/ add / RTB abd 15x 10x each w/ add / RTB abd 2x10 reps  2x10 reps  2x10 reps 2x10 reps 2x10 reps     SLR   AAROM 10 x  15x 15x ; 2 x 10 reps  3x10 reps 3x10 reps  3x10 reps  3x10 reps 3x10 reps     Ther Ex             Nustep 10m L1 10 min L1 15m 12 min L3 10 min L3 10 min L3 10 min L3 10 min L3 10 min L4    Standing hip ABD 20x HEP 20x ----- ---- ---       Standing Heel/Toe lift 20x 20x HR 20x 20x HR on  roll  20x HR on  roll  20x on  roll  20x on 1/2 roll  20x on 1/2 roll  20 x on 1/2 roll     Standing knee flex 20x HEP 20x ---- ----- ---  --- ----    Quad/Glut sets 15x10s HEP  ---- ------ ---  --- ----    Heel slide AAROM  20x 10x red PB  10x R LE x 10 reps  ------ ---  --- ----    Hip ABD supine AAROM 20x HEP 20x ---- ------ ---  ---- ----    LSU/FSU  4" step 10x each   6" step x 10 reps  6" step x 10 reps FSU  6" step x 20 reps FSU  8" step x 20 reps FSU/LSU 8" step x 20 reps  8" step x 20 reps     Ther Activity             STS   20" mat 2 x 10 reps   19" mat 2 x 10 reps  19" mat 2 x 10 reps  19" mat 2 x 10 reps  Pillow on regular chair 2 x 10 reps  Hi lo table 2x10 reps  2x10 reps hi lo table     Resisted lunge   Gr TB 20x ------- ------- --- ---- ---- ----    Gait Training             Step to w RW perf --- cane -------- ------- ---- --- ---- ---                 Modalities             CP perf Supine x 10 min  Supine 10 min  L S/L 10 min  ------ --- ---- ----

## 2022-08-30 ENCOUNTER — OFFICE VISIT (OUTPATIENT)
Dept: PHYSICAL THERAPY | Facility: CLINIC | Age: 57
End: 2022-08-30
Payer: COMMERCIAL

## 2022-08-30 DIAGNOSIS — M16.11 PRIMARY OSTEOARTHRITIS OF RIGHT HIP: Primary | ICD-10-CM

## 2022-08-30 PROCEDURE — 97112 NEUROMUSCULAR REEDUCATION: CPT

## 2022-08-30 PROCEDURE — 97110 THERAPEUTIC EXERCISES: CPT

## 2022-08-30 NOTE — PROGRESS NOTES
Daily Note     Today's date: 2022  Patient name: Asher Lai  : 1965  MRN: [id number]  Referring provider: Francoise Wilson DO  Dx:   Encounter Diagnosis     ICD-10-CM    1  Primary osteoarthritis of right hip  M16 11                   Subjective: I walked about 8000 steps today  Objective: See treatment diary below      Assessment: Tolerated treatment fair  Patient demonstrated fatigue post treatment, exhibited good technique with therapeutic exercises and would benefit from continued PT      Plan: Progress treatment as tolerated         Precautions: THR 22      Manuals    FOTO       R hip PROM/stretching (as tolerated)   AD AD AD AD AD AD         ITB STM AD AD AD AD                              Neuro Re-Ed             Balance WT Shifting lat/diag 2x20 ---- 2x20 ------ ------        [place] walkouts   #10 2x10 @#10 - 3 x 10 reps  @#10 - 3 x 10 reps  @#10 - 3 x 10 reps @#10 - 3 x 10 reps @#10 - 2 x 10 reps (lateral) @#10 - 2 x 10 reps (lateral)    sreedhar L/S stabs walkouts     -- -- -- @#9 - 2 x 10 reps  @#10 - 2 x 10 reps     S/L clamshells     ---- --- --- --- 2x10 reps                  Bridging   10x each w/ add / RTB abd 15x 10x each w/ add / RTB abd 2x10 reps  2x10 reps  2x10 reps 2x10 reps 2x10 reps     SLR   AAROM 10 x  15x 15x ; 2 x 10 reps  3x10 reps 3x10 reps  3x10 reps  3x10 reps 2# AK - 3x10 reps     Ther Ex             Nustep 10m L1 10 min L1 15m 12 min L3 10 min L3 10 min L3 10 min L3 10 min L3 10 min L4    Standing hip ABD 20x HEP 20x ----- ---- ---       Standing Heel/Toe lift 20x 20x HR 20x 20x HR on  roll  20x HR on  roll  20x on  roll  20x on  roll  20x on 1/2 roll  20 x on 1/2 roll     Standing knee flex 20x HEP 20x ---- ----- ---  --- ----    Quad/Glut sets 15x10s HEP  ---- ------ ---  --- ----    Heel slide AAROM  20x 10x red PB  10x R LE x 10 reps  ------ ---  --- ----    Hip ABD supine AAROM 20x HEP 20x ---- ------ ---  ---- ----    LSU/FSU  4" step 10x each   6" step x 10 reps  6" step x 10 reps FSU  6" step x 20 reps FSU  8" step x 20 reps FSU/LSU 8" step x 20 reps  8" step x 20 reps     Ther Activity             STS   20" mat 2 x 10 reps   19" mat 2 x 10 reps  19" mat 2 x 10 reps  19" mat 2 x 10 reps  Pillow on regular chair 2 x 10 reps  Hi lo table 2x10 reps  2x10 reps hi lo table     Resisted lunge   Gr TB 20x ------- ------- --- ---- ---- ----    Gait Training             Step to w RW perf --- cane -------- ------- ---- --- ---- ---                 Modalities             CP perf Supine x 10 min  Supine 10 min  L S/L 10 min  ------ --- ---- ----

## 2022-09-01 ENCOUNTER — OFFICE VISIT (OUTPATIENT)
Dept: PHYSICAL THERAPY | Facility: CLINIC | Age: 57
End: 2022-09-01
Payer: COMMERCIAL

## 2022-09-01 DIAGNOSIS — M16.11 PRIMARY OSTEOARTHRITIS OF RIGHT HIP: Primary | ICD-10-CM

## 2022-09-01 PROCEDURE — 97112 NEUROMUSCULAR REEDUCATION: CPT

## 2022-09-01 NOTE — PROGRESS NOTES
Daily Note     Today's date: 2022  Patient name: Wing Pastrana  : 1965  MRN: [id number]  Referring provider: Gordo Cardona DO  Dx:   Encounter Diagnosis     ICD-10-CM    1  Primary osteoarthritis of right hip  M16 11                   Subjective: I see the Dr next week  Objective: See treatment diary below      Assessment: Tolerated treatment well  Patient exhibited good technique with therapeutic exercises and would benefit from continued PT      Plan: Progress treatment as tolerated         Precautions: THR 22      Manuals    FOTO      R hip PROM/stretching (as tolerated)   AD AD AD AD AD AD AD        ITB STM AD AD AD AD AD                             Neuro Re-Ed             Balance WT Shifting lat/diag 2x20 ---- 2x20 ------ ------        [place] walkouts   #10 2x10 @#10 - 3 x 10 reps  @#10 - 3 x 10 reps  @#10 - 3 x 10 reps @#10 - 3 x 10 reps @#10 - 2 x 10 reps (lateral) @#10 - 2 x 10 reps (lateral) @#10 - 2 x 10 reps (lateral)   sreedhar L/S stabs walkouts     -- -- -- @#9 - 2 x 10 reps  @#10 - 2 x 10 reps  @#10 - 2 x 10 reps    S/L clamshells     ---- --- --- --- 2x10 reps  2x10 reps                 Bridging   10x each w/ add / RTB abd 15x 10x each w/ add / RTB abd 2x10 reps  2x10 reps  2x10 reps 2x10 reps 2x10 reps  2x10 reps    SLR   AAROM 10 x  15x 15x ; 2 x 10 reps  3x10 reps 3x10 reps  3x10 reps  3x10 reps 2# AK - 3x10 reps  2# AK - 3x10 reps   Ther Ex             Nustep 10m L1 10 min L1 15m 12 min L3 10 min L3 10 min L3 10 min L3 10 min L3 10 min L4 10 min L4   Standing hip ABD 20x HEP 20x ----- ---- ---       Standing Heel/Toe lift 20x 20x HR 20x 20x HR on 1/2 roll  20x HR on 1/2 roll  20x on 1/2 roll  20x on 1/2 roll  20x on 1/2 roll  20 x on 1/2 roll  20 x on 1/2 roll   Standing knee flex 20x HEP 20x ---- ----- ---  --- ----    Quad/Glut sets 15x10s HEP  ---- ------ ---  --- ----    Heel slide AAROM  20x 10x red PB  10x R LE x 10 reps ------ ---  --- ----    Hip ABD supine AAROM 20x HEP 20x ---- ------ ---  ---- ----    LSU/FSU  4" step 10x each   6" step x 10 reps  6" step x 10 reps FSU  6" step x 20 reps FSU  8" step x 20 reps FSU/LSU 8" step x 20 reps  8" step x 20 reps  8" step x 20 reps    Ther Activity             STS   20" mat 2 x 10 reps   19" mat 2 x 10 reps  19" mat 2 x 10 reps  19" mat 2 x 10 reps  Pillow on regular chair 2 x 10 reps  Hi lo table 2x10 reps  8" step x 20 reps   8" step x 20 reps    Resisted lunge   Gr TB 20x ------- ------- --- ---- ---- ----    Gait Training             Step to w RW perf --- cane -------- ------- ---- --- ---- ---                 Modalities             CP perf Supine x 10 min  Supine 10 min  L S/L 10 min  ------ --- ---- ----

## 2022-09-06 ENCOUNTER — OFFICE VISIT (OUTPATIENT)
Dept: PHYSICAL THERAPY | Facility: CLINIC | Age: 57
End: 2022-09-06
Payer: COMMERCIAL

## 2022-09-06 DIAGNOSIS — M16.11 PRIMARY OSTEOARTHRITIS OF RIGHT HIP: Primary | ICD-10-CM

## 2022-09-06 PROCEDURE — 97112 NEUROMUSCULAR REEDUCATION: CPT

## 2022-09-06 NOTE — PROGRESS NOTES
Daily Note     Today's date: 2022  Patient name: Sky Clancy  : 1965  MRN: [id number]  Referring provider: Bob Camarillo DO  Dx:   Encounter Diagnosis     ICD-10-CM    1  Primary osteoarthritis of right hip  M16 11                   Subjective: I see the Dr tomorrow  My hip is feeling great  Objective: See treatment diary below   TUG 6 9 seconds  Gait Speed 1 6 m/s    Assessment: Tolerated treatment well  Patient exhibited good technique with therapeutic exercises      Plan: D/C with HEP        Precautions: THR 22      Manuals    FOTO      R hip PROM/stretching (as tolerated)   AD AD AD AD AD AD AD        ITB STM AD AD AD AD AD                             Neuro Re-Ed             Balance WT Shifting lat/diag 2x20 ---- 2x20 ------ ------        Sreedhar walkouts   #10 2x10 @#10 - 3 x 10 reps  @#10 - 3 x 10 reps  @#10 - 3 x 10 reps @#10 - 3 x 10 reps @#10 - 2 x 10 reps (lateral) @#10 - 2 x 10 reps (lateral) @#10 - 2 x 10 reps (lateral)   sreedhar L/S stabs walkouts     -- -- -- @#9 - 2 x 10 reps  @#10 - 2 x 10 reps  @#10 - 2 x 10 reps    S/L clamshells     ---- --- --- --- 2x10 reps  2x10 reps                 Bridging   10x each w/ add / RTB abd 15x 10x each w/ add / RTB abd 2x10 reps  2x10 reps  2x10 reps 2x10 reps 2x10 reps  -----   SLR   AAROM 10 x  15x 15x ; 2 x 10 reps  3x10 reps 3x10 reps  3x10 reps  3x10 reps 2# AK - 3x10 reps  -----   Ther Ex             Nustep 10m L1 10 min L1 15m 12 min L3 10 min L3 10 min L3 10 min L3 10 min L3 10 min L4 10 min L4 (bike)   Standing hip ABD 20x HEP 20x ----- ---- ---       Standing Heel/Toe lift 20x 20x HR 20x 20x HR on 1/2 roll  20x HR on 1/2 roll  20x on 1/2 roll  20x on 1/2 roll  20x on 1/2 roll  20 x on 1/2 roll  20 x on 1/2 roll   Standing knee flex 20x HEP 20x ---- ----- ---  --- ---- ---   Quad/Glut sets 15x10s HEP  ---- ------ ---  --- ---- ---   Heel slide AAROM  20x 10x red PB  10x R LE x 10 reps ------ ---  --- ---- ----   Hip ABD supine AAROM 20x HEP 20x ---- ------ ---  ---- ----    LSU/FSU  4" step 10x each   6" step x 10 reps  6" step x 10 reps FSU  6" step x 20 reps FSU  8" step x 20 reps FSU/LSU 8" step x 20 reps  8" step x 20 reps  8" step x 20 reps    Ther Activity             STS   20" mat 2 x 10 reps   19" mat 2 x 10 reps  19" mat 2 x 10 reps  19" mat 2 x 10 reps  Pillow on regular chair 2 x 10 reps  Hi lo table 2x10 reps  8" step x 20 reps   8" step x 20 reps    Resisted lunge   Gr TB 20x ------- ------- --- ---- ---- ---- ----   Gait Training             Step to w RW perf --- cane -------- ------- ---- --- ---- --- ---                Modalities             CP perf Supine x 10 min  Supine 10 min  L S/L 10 min  ------ --- ---- ---- ---

## 2022-09-06 NOTE — PROGRESS NOTES
PT Discharge    Today's date: 2022  Patient name: Sumi Moctezuma  : 1965  MRN: [id number]  Referring provider: Danuta Kowalski DO  Dx:   Encounter Diagnosis     ICD-10-CM    1  Primary osteoarthritis of right hip  M16 11        Start Time:   Stop Time: 161  Total time in clinic (min): 45 minutes    Assessment  Assessment details: Sumi Moctezuma has been seen for Primary osteoarthritis of right hip  (primary encounter diagnosis),and has met the goals of physical therapy  And is independent with a HEP  Plan  Plan details: DC PT        Subjective Evaluation    History of Present Illness  Mechanism of injury: I am feeling great, no limitations      Pain  Current pain ratin  At best pain ratin  At worst pain ratin          Objective     Functional Assessment        Comments  TUG  6 9 sec  Gait Speed 1 4 m/s      Flowsheet Rows    Flowsheet Row Most Recent Value   PT/OT G-Codes    Current Score 79   Projected Score 44

## 2022-09-07 ENCOUNTER — OFFICE VISIT (OUTPATIENT)
Dept: OBGYN CLINIC | Facility: CLINIC | Age: 57
End: 2022-09-07

## 2022-09-07 VITALS
DIASTOLIC BLOOD PRESSURE: 90 MMHG | BODY MASS INDEX: 31.1 KG/M2 | WEIGHT: 210 LBS | SYSTOLIC BLOOD PRESSURE: 150 MMHG | HEIGHT: 69 IN | HEART RATE: 80 BPM

## 2022-09-07 DIAGNOSIS — Z47.1 AFTERCARE FOLLOWING RIGHT HIP JOINT REPLACEMENT SURGERY: ICD-10-CM

## 2022-09-07 DIAGNOSIS — Z96.641 STATUS POST TOTAL REPLACEMENT OF RIGHT HIP: Primary | ICD-10-CM

## 2022-09-07 DIAGNOSIS — Z96.641 AFTERCARE FOLLOWING RIGHT HIP JOINT REPLACEMENT SURGERY: ICD-10-CM

## 2022-09-07 PROCEDURE — 99024 POSTOP FOLLOW-UP VISIT: CPT | Performed by: ORTHOPAEDIC SURGERY

## 2022-09-07 RX ORDER — AMOXICILLIN 500 MG/1
2000 TABLET, FILM COATED ORAL
Qty: 4 TABLET | Refills: 2 | Status: SHIPPED | OUTPATIENT
Start: 2022-09-07 | End: 2023-09-07

## 2022-09-07 NOTE — PROGRESS NOTES
Assessment/Plan:  1  Status post total replacement of right hip  amoxicillin (AMOXIL) 500 MG tablet   2  Aftercare following right hip joint replacement surgery  amoxicillin (AMOXIL) 500 MG tablet     Scribe Attestation    I,:  Preston Rabago am acting as a scribe while in the presence of the attending physician :       I,:  Alexa Dejesus, DO personally performed the services described in this documentation    as scribed in my presence :         Janice Cuellar is a pleasant 26-year-old male who returns today for follow-up evaluation 6 weeks status post right total hip arthroplasty  I am very pleased with his clinical presentation today in the office  He no longer requires DVT prophylaxis  He should continue with his home exercise program   I did encourage him to gradually return to activity as desired, including golf  He does understand he requires prophylactic antibiotics prior to any dental care  I did provide him with a prescription for amoxicillin for his upcoming dental appointment  We will see him back in 6 weeks for his three-month postoperative appointment with x-ray on arrival     Subjective: Follow-up evaluation 6 weeks status post right total hip arthroplasty    Patient ID: Peg Inman is a 64 y o  male who returns today for follow-up evaluation 6 weeks status post right total hip arthroplasty  He was discharged from physical therapy to a home exercise program yesterday  He has completed aspirin for DVT prophylaxis  He has been returning to desired activity without limitation or pain  He does report some dysesthesias about the lateral aspect his right thigh  He reports that this has been improving  He is curious about using a hot tub and returning to golf  He denies any new injury or trauma  He is very pleased with his outcome so far  He is also curious about moving forward with dental care that he has planned  Review of Systems   Constitutional: Positive for activity change   Negative for chills, fever and unexpected weight change  HENT: Negative for hearing loss, nosebleeds and sore throat  Eyes: Negative for pain, redness and visual disturbance  Respiratory: Negative for cough, shortness of breath and wheezing  Cardiovascular: Negative for chest pain, palpitations and leg swelling  Gastrointestinal: Negative for abdominal pain, nausea and vomiting  Endocrine: Negative for polyphagia and polyuria  Genitourinary: Negative for dysuria and hematuria  Musculoskeletal: Negative for arthralgias, joint swelling and myalgias  See HPI   Skin: Negative for rash and wound  Neurological: Negative for dizziness, numbness and headaches  Psychiatric/Behavioral: Negative for decreased concentration and suicidal ideas  The patient is not nervous/anxious  Past Medical History:   Diagnosis Date    Diabetes mellitus ([facility] [address] )     type 2 diet controlled    Gout     Hypercholesteremia     Hypertension        Past Surgical History:   Procedure Laterality Date    APPENDECTOMY      ARTHROPLASTY HIP TOTAL ANTERIOR Right 2022    Procedure: ARTHROPLASTY HIP TOTAL ANTERIOR, NAVIGATED;  Surgeon: Luis Carlos Jacobs DO;  Location: [address] [place] [address];  Service: Orthopedics    COLONOSCOPY N/A 2016    Procedure: COLONOSCOPY;  Surgeon: Brent De Leon MD;  Location: [facility] GI LAB; Service:    [facility] INJECTION RIGHT HIP (NON ARTHROGRAM)  3/10/2022    AL ARTHROCENTESIS ASPIR&/INJ MAJOR JT/BURSA W/O US Right 3/10/2022    Procedure: Hip intra-articular cortisone injection ( [id number] );   Surgeon: Connor Ferraro MD;  Location: [facility] OR;  Service: Pain Management        Family History   Problem Relation Age of Onset    Diabetes Mother             Diabetes Father            [facility] Cancer Father     Diabetes Sister     Colon cancer Family        Social History     Occupational History    Not on file   Tobacco Use    Smoking status: Former Smoker     Packs/day: 2 00     Years: 15 00 Pack years: 30 00     Types: Cigarettes     Start date: 1980     Quit date: 2008     Years since quittin 0    Smokeless tobacco: Never Used    Tobacco comment: quit 10 years ago   Vaping Use    Vaping Use: Never used   Substance and Sexual Activity    Alcohol use: Yes     Alcohol/week: 15 0 standard drinks     Types: 5 Cans of beer, 10 Standard drinks or equivalent per week     Comment: beer two to three times per week Rachelle Leech     Drug use: No    Sexual activity: Yes     Partners: Female     Birth control/protection: Male Sterilization         Current Outpatient Medications:     allopurinol (ZYLOPRIM) 300 mg tablet, Take 1 tablet (300 mg total) by mouth daily, Disp: 90 tablet, Rfl: 2    amoxicillin (AMOXIL) 500 MG tablet, Take 4 tablets (2,000 mg total) by mouth 60 minutes pre-procedure, Disp: 4 tablet, Rfl: 2    lisinopril (ZESTRIL) 2 5 mg tablet, Take 1 tablet (2 5 mg total) by mouth daily, Disp: 90 tablet, Rfl: 2    simvastatin (ZOCOR) 20 mg tablet, Take 1 tablet (20 mg total) by mouth daily, Disp: 90 tablet, Rfl: 2    aspirin 325 mg tablet, Take 1 tablet (325 mg total) by mouth 2 (two) times a day for 84 doses, Disp: 84 tablet, Rfl: 0    Multiple Vitamins-Minerals (multivitamin with minerals) tablet, Take 1 tablet by mouth daily, Disp: 90 tablet, Rfl: 0    No Known Allergies    Objective:  Vitals:    22 0904   BP: 150/90   Pulse: 80       Body mass index is 31 01 kg/m²  Right Hip Exam     Tenderness   The patient is experiencing no tenderness  Range of Motion   Abduction: 50   Adduction: 30   Extension: 0   Flexion: 120   External rotation: 50   Internal rotation: 20     Muscle Strength   Flexion: 5/5     Tests   GINGER: negative    Other   Erythema: absent  Scars: present  Sensation: normal  Pulse: present    Comments: Well healed anterior operative scar   Small spitting suture in distal aspect of wound debrided today in the office under aseptic technique  Stinchfield: negative  FADIR: negative            Physical Exam  Vitals and nursing note reviewed  Constitutional:       Appearance: He is well-developed  HENT:      Head: Normocephalic and atraumatic  Eyes:      General: No scleral icterus  Extraocular Movements: Extraocular movements intact  Conjunctiva/sclera: Conjunctivae normal    Cardiovascular:      Rate and Rhythm: Normal rate  Pulmonary:      Effort: Pulmonary effort is normal  No respiratory distress  Musculoskeletal:      Cervical back: Normal range of motion and neck supple  Comments: As noted in HPI   Skin:     General: Skin is warm and dry  Neurological:      Mental Status: He is alert and oriented to person, place, and time     Psychiatric:         Behavior: Behavior normal  Dorsal Nasal Flap Text: The defect edges were debeveled with a #15 scalpel blade.  Given the location of the defect and the proximity to free margins a dorsal nasal flap was deemed most appropriate.  Using a sterile surgical marker, an appropriate dorsal nasal flap was drawn around the defect.    The area thus outlined was incised deep to adipose tissue with a #15 scalpel blade.  The skin margins were undermined to an appropriate distance in all directions utilizing iris scissors.

## 2022-09-14 ENCOUNTER — TELEPHONE (OUTPATIENT)
Dept: FAMILY MEDICINE CLINIC | Facility: CLINIC | Age: 57
End: 2022-09-14

## 2022-10-19 ENCOUNTER — APPOINTMENT (OUTPATIENT)
Dept: RADIOLOGY | Facility: CLINIC | Age: 57
End: 2022-10-19
Payer: COMMERCIAL

## 2022-10-19 ENCOUNTER — OFFICE VISIT (OUTPATIENT)
Dept: OBGYN CLINIC | Facility: CLINIC | Age: 57
End: 2022-10-19

## 2022-10-19 VITALS
WEIGHT: 211 LBS | SYSTOLIC BLOOD PRESSURE: 140 MMHG | HEART RATE: 80 BPM | DIASTOLIC BLOOD PRESSURE: 80 MMHG | BODY MASS INDEX: 31.25 KG/M2 | HEIGHT: 69 IN

## 2022-10-19 DIAGNOSIS — Z96.641 STATUS POST TOTAL REPLACEMENT OF RIGHT HIP: Primary | ICD-10-CM

## 2022-10-19 DIAGNOSIS — Z47.1 AFTERCARE FOLLOWING RIGHT HIP JOINT REPLACEMENT SURGERY: ICD-10-CM

## 2022-10-19 DIAGNOSIS — Z96.641 STATUS POST TOTAL REPLACEMENT OF RIGHT HIP: ICD-10-CM

## 2022-10-19 DIAGNOSIS — Z96.641 AFTERCARE FOLLOWING RIGHT HIP JOINT REPLACEMENT SURGERY: ICD-10-CM

## 2022-10-19 PROCEDURE — 73502 X-RAY EXAM HIP UNI 2-3 VIEWS: CPT

## 2022-10-19 PROCEDURE — 99024 POSTOP FOLLOW-UP VISIT: CPT | Performed by: ORTHOPAEDIC SURGERY

## 2022-10-19 NOTE — PROGRESS NOTES
Assessment/Plan:  1  Status post total replacement of right hip  XR hip/pelv 2-3 vws right if performed   2  Aftercare following right hip joint replacement surgery       Scribe Attestation    I,:  Briana Card MA am acting as a scribe while in the presence of the attending physician :       I,:  Effie Cole,  personally performed the services described in this documentation    as scribed in my presence :           69-year-old male 3 months status post right total hip arthroplasty  The patient is doing well postoperatively  He is very pleased with the outcome of his surgery thus far  X-rays were reviewed which demonstrate a well-positioned well-aligned right total hip arthroplasty  We did discuss the need for prophylaxis antibiotics prior to any dental procedure  He will continue with activity as tolerated  He will follow up in 9 months for repeat evaluation  This will be the anniversary of his surgery  We will get x-rays of his right hip upon arrival  All of his questions were addressed and he may call the office at any time with any questions or concerns  Subjective: 3 months status post right total hip arthroplasty     Patient ID: Monroe Lo is a 64 y o  male  HPI  69-year-old male 3 month status post right total hip arthroplasty  The patient states he is doing well postoperatively  He is very pleased with the outcome of his surgery thus far  He denies any groin pain  He has returned to golfing without any issues  He states he has returned to normal activity without limitations  He does note continued numbness to the lateral aspect of his hip  Review of Systems   Constitutional: Positive for activity change  Negative for chills and fever  HENT: Negative for drooling and sneezing  Eyes: Negative for redness  Respiratory: Negative for cough and wheezing  Gastrointestinal: Negative for nausea and vomiting  Musculoskeletal: Negative for arthralgias, joint swelling and myalgias  Neurological: Negative for weakness and numbness  Psychiatric/Behavioral: Negative for behavioral problems  The patient is not nervous/anxious  Past Medical History:   Diagnosis Date   • Diabetes mellitus ([address] )     type 2 diet controlled   • Gout    • Hypercholesteremia    • Hypertension        Past Surgical History:   Procedure Laterality Date   • APPENDECTOMY     • ARTHROPLASTY HIP TOTAL ANTERIOR Right 2022    Procedure: ARTHROPLASTY HIP TOTAL ANTERIOR, NAVIGATED;  Surgeon: Monique Hoffman DO;  Location: [address] [place] [address];  Service: Orthopedics   • COLONOSCOPY N/A 2016    Procedure: COLONOSCOPY;  Surgeon: Doretha Abrams MD;  Location: [facility]; Service:    • [facility] INJECTION RIGHT HIP (NON ARTHROGRAM)  3/10/2022   • [place] ARTHROCENTESIS ASPIR&/INJ MAJOR JT/BURSA W/O US Right 3/10/2022    Procedure: Hip intra-articular cortisone injection ( [id number] ); Surgeon: Sarah Flores MD;  Location: [facility] MAIN OR;  Service: Pain Management        Family History   Problem Relation Age of Onset   • Diabetes Mother            • Diabetes Father            • Cancer Father    • Diabetes Sister    • Colon cancer Family        Social History     Occupational History   • Not on file   Tobacco Use   • Smoking status: Former Smoker     Packs/day: 2 00     Years: 15 00     Pack years: 30 00     Types: Cigarettes     Start date: 1980     Quit date: 2008     Years since quittin 1   • Smokeless tobacco: Never Used   • Tobacco comment: quit 10 years ago   Vaping Use   • Vaping Use: Never used   Substance and Sexual Activity   • Alcohol use:  Yes     Alcohol/week: 15 0 standard drinks     Types: 5 Cans of beer, 10 Standard drinks or equivalent per week     Comment: beer two to three times per week /occasional    • Drug use: No   • Sexual activity: Yes     Partners: Female     Birth control/protection: Male Sterilization         Current Outpatient Medications:   •  allopurinol (ZYLOPRIM) 300 mg tablet, Take 1 tablet (300 mg total) by mouth daily, Disp: 90 tablet, Rfl: 2  •  amoxicillin (AMOXIL) 500 MG tablet, Take 4 tablets (2,000 mg total) by mouth 60 minutes pre-procedure, Disp: 4 tablet, Rfl: 2  •  aspirin 325 mg tablet, Take 1 tablet (325 mg total) by mouth 2 (two) times a day for 84 doses, Disp: 84 tablet, Rfl: 0  •  lisinopril (ZESTRIL) 2 5 mg tablet, Take 1 tablet (2 5 mg total) by mouth daily, Disp: 90 tablet, Rfl: 2  •  Multiple Vitamins-Minerals (multivitamin with minerals) tablet, Take 1 tablet by mouth daily, Disp: 90 tablet, Rfl: 0  •  simvastatin (ZOCOR) 20 mg tablet, Take 1 tablet (20 mg total) by mouth daily, Disp: 90 tablet, Rfl: 2    No Known Allergies    Objective:  Vitals:    10/19/22 0910   BP: 140/80   Pulse: 80       Body mass index is 31 16 kg/m²  Right Hip Exam     Range of Motion   Abduction: 45   Adduction: 30   Extension: 0   Flexion: 120   Right hip external rotation: 45  Internal rotation: 25     Muscle Strength   Abduction: 5/5   Adduction: 5/5   Flexion: 5/5     Other   Erythema: absent  Sensation: normal  Pulse: present    Comments:  Anterior hip incision well healed             Physical Exam  Vitals and nursing note reviewed  Constitutional:       Appearance: He is well-developed  HENT:      Head: Normocephalic and atraumatic  Eyes:      General: No scleral icterus  Right eye: No discharge  Left eye: No discharge  Extraocular Movements: Extraocular movements intact  Conjunctiva/sclera: Conjunctivae normal    Cardiovascular:      Rate and Rhythm: Normal rate  Pulmonary:      Effort: Pulmonary effort is normal  No respiratory distress  Musculoskeletal:      Cervical back: Normal range of motion and neck supple  Comments: As noted in HPI   Skin:     General: Skin is warm and dry  Neurological:      Mental Status: He is alert and oriented to person, place, and time     Psychiatric:         Behavior: Behavior normal          Thought Content: Thought content normal          Judgment: Judgment normal          I have personally reviewed pertinent films in PACS  X-rays right hip performed in the office today demonstrate a well-positioned well-aligned right total hip arthroplasty  No fractures or dislocations  No lytic or blastic lesions

## 2022-10-26 ENCOUNTER — OFFICE VISIT (OUTPATIENT)
Dept: FAMILY MEDICINE CLINIC | Facility: CLINIC | Age: 57
End: 2022-10-26
Payer: COMMERCIAL

## 2022-10-26 VITALS
SYSTOLIC BLOOD PRESSURE: 124 MMHG | DIASTOLIC BLOOD PRESSURE: 80 MMHG | TEMPERATURE: 99 F | HEIGHT: 69 IN | OXYGEN SATURATION: 98 % | WEIGHT: 212 LBS | RESPIRATION RATE: 14 BRPM | BODY MASS INDEX: 31.4 KG/M2 | HEART RATE: 91 BPM

## 2022-10-26 DIAGNOSIS — Z12.5 SCREENING PSA (PROSTATE SPECIFIC ANTIGEN): ICD-10-CM

## 2022-10-26 DIAGNOSIS — Z23 ENCOUNTER FOR ADMINISTRATION OF VACCINE: ICD-10-CM

## 2022-10-26 DIAGNOSIS — Z00.00 ANNUAL PHYSICAL EXAM: Primary | ICD-10-CM

## 2022-10-26 DIAGNOSIS — Z12.2 ENCOUNTER FOR SCREENING FOR LUNG CANCER: ICD-10-CM

## 2022-10-26 DIAGNOSIS — F17.211 NICOTINE DEPENDENCE, CIGARETTES, IN REMISSION: ICD-10-CM

## 2022-10-26 DIAGNOSIS — E11.9 TYPE 2 DIABETES MELLITUS WITHOUT COMPLICATION, WITHOUT LONG-TERM CURRENT USE OF INSULIN (HCC): ICD-10-CM

## 2022-10-26 LAB — SL AMB POCT HEMOGLOBIN AIC: 6.5 (ref ?–6.5)

## 2022-10-26 PROCEDURE — 90471 IMMUNIZATION ADMIN: CPT

## 2022-10-26 PROCEDURE — 90682 RIV4 VACC RECOMBINANT DNA IM: CPT

## 2022-10-26 PROCEDURE — 99396 PREV VISIT EST AGE 40-64: CPT | Performed by: FAMILY MEDICINE

## 2022-10-26 PROCEDURE — 83036 HEMOGLOBIN GLYCOSYLATED A1C: CPT | Performed by: FAMILY MEDICINE

## 2022-10-26 NOTE — PROGRESS NOTES
ADULT ANNUAL [address] [facility],  [address]    NAME: Carlos Fiugeroa  AGE: 64 y o  SEX: male  : 1965     DATE: 10/27/2022     Assessment and Plan:     Problem List Items Addressed This Visit        Endocrine    Diabetes mellitus, type 2 ([facility] [address] )    Relevant Orders    POCT hemoglobin A1c (Completed)    Microalbumin / creatinine urine ratio (Completed)      Other Visit Diagnoses     Annual physical exam    -  Primary    Encounter for administration of vaccine        Relevant Orders    influenza vaccine, quadrivalent, recombinant, PF, 0 5 mL, for patients 18 yr+ (FLUBLOK) (Completed)    Encounter for screening for lung cancer        Relevant Orders    CT lung screening program    Nicotine dependence, cigarettes, in remission        Relevant Orders    CT lung screening program    Screening PSA (prostate specific antigen)        Relevant Orders    PSA, Total Screen        DM type 2 continues to be well controlled with Diet   Foot exam normal  Recommend Eye exam  Urine testing sent  Ct Lung ordered w/ PSA  HLD continue on statin   Immunizations and preventive care screenings were discussed with patient today  Appropriate education was printed on patient's after visit summary  Discussed risks and benefits of prostate cancer screening  We discussed the controversial history of PSA screening for prostate cancer in the [place] as well as the risk of over detection and over treatment of prostate cancer by way of PSA screening  The patient understands that PSA blood testing is an imperfect way to screen for prostate cancer and that elevated PSA levels in the blood may also be caused by infection, inflammation, prostatic trauma or manipulation, urological procedures, or by benign prostatic enlargement      The role of the digital rectal examination in prostate cancer screening was also discussed and I discussed with him that there is large interobserver variability in the findings of digital rectal examination  BMI Counseling: Body mass index is 31 31 kg/m²  The BMI is above normal  Nutrition recommendations include decreasing fast food intake and consuming healthier snacks  Exercise recommendations include exercising 3-5 times per week  Rationale for BMI follow-up plan is due to patient being overweight or obese  Depression Screening and Follow-up Plan: Patient was screened for depression during today's encounter  They screened negative with a PHQ-2 score of 0  No follow-ups on file  Chief Complaint:     Chief Complaint   Patient presents with   • Annual Exam      History of Present Illness:     Adult Annual Physical   Patient here for a comprehensive physical exam    IT band tight; recently had surgery  Walking all the time now  Dm eating healthy; strong family history of DM  History of smoking    Diet and Physical Activity  Diet/Nutrition: well balanced diet  Exercise: walking  Depression Screening  PHQ-2/9 Depression Screening    Little interest or pleasure in doing things: 0 - not at all  Feeling down, depressed, or hopeless: 0 - not at all  PHQ-2 Score: 0  PHQ-2 Interpretation: Negative depression screen       General Health  Sleep: sleeps well  Hearing: normal - bilateral   Vision: no vision problems  Dental: regular dental visits   Health  Symptoms include: urinary hesitancy     Review of Systems:     Review of Systems   Constitutional: Negative for activity change, appetite change, chills, fatigue and fever  HENT: Negative for congestion  Respiratory: Negative for cough, chest tightness and shortness of breath  Cardiovascular: Negative for chest pain and leg swelling  Gastrointestinal: Negative for abdominal distention, abdominal pain, constipation, diarrhea, nausea and vomiting  Genitourinary: Positive for difficulty urinating  All other systems reviewed and are negative       Past Medical History: Past Medical History:   Diagnosis Date   • Diabetes mellitus ([facility] [address] )     type 2 diet controlled   • Gout    • Hypercholesteremia    • Hypertension       Past Surgical History:     Past Surgical History:   Procedure Laterality Date   • APPENDECTOMY     • ARTHROPLASTY HIP TOTAL ANTERIOR Right 2022    Procedure: ARTHROPLASTY HIP TOTAL ANTERIOR, NAVIGATED;  Surgeon: Joseph Gonzalez DO;  Location: [address] [place] [address];  Service: Orthopedics   • COLONOSCOPY N/A 2016    Procedure: COLONOSCOPY;  Surgeon: Lex Esparza MD;  Location: [place] [address] GI LAB; Service:    • [facility] INJECTION RIGHT HIP (NON ARTHROGRAM)  3/10/2022   • [place] ARTHROCENTESIS ASPIR&/INJ MAJOR JT/BURSA W/O US Right 3/10/2022    Procedure: Hip intra-articular cortisone injection ( [id number] ); Surgeon: Maite Avila MD;  Location: [facility] MAIN OR;  Service: Pain Management       Family History:     Family History   Problem Relation Age of Onset   • Diabetes Mother            • Diabetes Father            • Cancer Father    • Diabetes Sister    • Colon cancer Family       Social History:     Social History     Socioeconomic History   • Marital status: /Civil Union     Spouse name: None   • Number of children: None   • Years of education: None   • Highest education level: None   Occupational History   • None   Tobacco Use   • Smoking status: Former Smoker     Packs/day: 2 00     Years: 15 00     Pack years: 30 00     Types: Cigarettes     Start date: 1980     Quit date: 2008     Years since quittin 1   • Smokeless tobacco: Never Used   • Tobacco comment: quit 10 years ago   Vaping Use   • Vaping Use: Never used   Substance and Sexual Activity   • Alcohol use:  Yes     Alcohol/week: 15 0 standard drinks     Types: 5 Cans of beer, 10 Standard drinks or equivalent per week     Comment: beer two to three times per week /occasional    • Drug use: No   • Sexual activity: Yes     Partners: Female     Birth control/protection: Male Sterilization   Other Topics Concern   • None   Social History Narrative    Caffeine use      Social Determinants of Health     Financial Resource Strain: Not on file   Food Insecurity: Not on file   Transportation Needs: Not on file   Physical Activity: Not on file   Stress: Not on file   Social Connections: Not on file   Intimate Partner Violence: Not on file   Housing Stability: Not on file      Current Medications:     Current Outpatient Medications   Medication Sig Dispense Refill   • allopurinol (ZYLOPRIM) 300 mg tablet Take 1 tablet (300 mg total) by mouth daily 90 tablet 2   • amoxicillin (AMOXIL) 500 MG tablet Take 4 tablets (2,000 mg total) by mouth 60 minutes pre-procedure 4 tablet 2   • lisinopril (ZESTRIL) 2 5 mg tablet Take 1 tablet (2 5 mg total) by mouth daily 90 tablet 2   • simvastatin (ZOCOR) 20 mg tablet Take 1 tablet (20 mg total) by mouth daily 90 tablet 2     No current facility-administered medications for this visit  Allergies:     No Known Allergies   Physical Exam:     /80 (BP Location: Right arm, Patient Position: Sitting, Cuff Size: Large)   Pulse 91   Temp 99 °F (37 2 °C)   Resp 14   Ht 5' 9" (1 753 m)   Wt 96 2 kg (212 lb)   SpO2 98%   BMI 31 31 kg/m²     Physical Exam  Vitals reviewed  Constitutional:       Appearance: He is well-developed  HENT:      Head: Normocephalic and atraumatic  Right Ear: External ear normal       Left Ear: External ear normal       Nose: Nose normal    Eyes:      Conjunctiva/sclera: Conjunctivae normal       Pupils: Pupils are equal, round, and reactive to light  Cardiovascular:      Rate and Rhythm: Normal rate and regular rhythm  Pulses: no weak pulses          Dorsalis pedis pulses are 2+ on the right side and 2+ on the left side  Posterior tibial pulses are 2+ on the right side and 2+ on the left side  Heart sounds: Normal heart sounds     Pulmonary:      Effort: Pulmonary effort is normal       Breath sounds: Normal breath sounds  No wheezing  Abdominal:      General: Bowel sounds are normal  There is no distension  Palpations: Abdomen is soft  There is no mass  Tenderness: There is no abdominal tenderness  Genitourinary:     Prostate: Normal       Rectum: Normal    Musculoskeletal:         General: No tenderness  Normal range of motion  Cervical back: Normal range of motion and neck supple  Feet:      Right foot:      Skin integrity: No ulcer, skin breakdown, erythema, warmth, callus or dry skin  Left foot:      Skin integrity: No ulcer, skin breakdown, erythema, warmth, callus or dry skin  Skin:     General: Skin is warm  Capillary Refill: Capillary refill takes less than 2 seconds  Neurological:      Mental Status: He is alert and oriented to person, place, and time  Cranial Nerves: No cranial nerve deficit  Sensory: No sensory deficit  Motor: No abnormal muscle tone  Coordination: Coordination normal       Deep Tendon Reflexes: Reflexes normal    Psychiatric:         Behavior: Behavior normal          Thought Content: Thought content normal          Judgment: Judgment normal        Diabetic Foot Exam    Patient's shoes and socks removed  Right Foot/Ankle   Right Foot Inspection  Skin Exam: skin normal and skin intact  No dry skin, no warmth, no callus, no erythema, no maceration, no abnormal color, no pre-ulcer, no ulcer and no callus  Toe Exam: ROM and strength within normal limits  No swelling    Sensory   Vibration: intact  Proprioception: intact  Monofilament testing: intact    Vascular  Capillary refills: < 3 seconds  The right DP pulse is 2+  The right PT pulse is 2+  Left Foot/Ankle  Left Foot Inspection  Skin Exam: skin normal and skin intact  No dry skin, no warmth, no erythema, no maceration, normal color, no pre-ulcer, no ulcer and no callus  Toe Exam: ROM and strength within normal limits  No swelling       Sensory   Vibration: intact  Proprioception: intact  Monofilament testing: intact    Vascular  Capillary refills: < 3 seconds  The left DP pulse is 2+  The left PT pulse is 2+       Assign Risk Category  No deformity present  No loss of protective sensation  No weak pulses  Risk: 0      Vikram Calero MD  [address]

## 2022-10-27 ENCOUNTER — TELEPHONE (OUTPATIENT)
Dept: ADMINISTRATIVE | Facility: OTHER | Age: 57
End: 2022-10-27

## 2022-10-27 LAB
ALBUMIN/CREAT UR: <6 MG/G CREAT (ref 0–29)
CREAT UR-MCNC: 54 MG/DL
MICROALBUMIN UR-MCNC: <3 UG/ML

## 2022-10-27 NOTE — LETTER
Vaccination Request Form: Zoster      Date Requested: 10/27/22  Patient: Wei Cotton  Patient : 1965   Referring Provider: Margarito Ablarado MD       The above patient has informed us that they have had their   most recent Zoster administered at your facility  Please   complete this form and attach all corresponding documentation  Date of Vaccine(s) Given  ______________________________    Lot Number(s) _______________________________________    Manufacture(s) ______________________________________    Dose Amount (s) _____________________________________    Expiration Date(s) ____________________________________    Comments __________________________________________________________  ____________________________________________________________________  ____________________________________________________________________  ____________________________________________________________________    Administering Facility  ________________________________________________    Vaccine Administered By (print name) ___________________________________      Form Completed By (print name) _______________________________________      Signature ___________________________________________________________      These reports are needed for  compliance  Please fax this completed form and a copy of the Vaccine Document(s) to our office located at [place] [address] as soon as possible to [phone] attention Virlinda Gains: Phone [phone]    We thank you for your assistance in treating our mutual patient

## 2022-10-27 NOTE — TELEPHONE ENCOUNTER
Upon review of the In Basket request and the patient's chart, initial outreach has been made via fax to facility  , please see Contacts section for details       Thank you  Carolynn Nava

## 2022-10-27 NOTE — TELEPHONE ENCOUNTER
----- Message from Gege Pelaez MD sent at 10/26/2022 11:16 AM EDT -----  Regarding: care gap request  10/26/22 11:16 AM    Hello, our patient attached above has had [employer] series      Thank you,  Meggan UZNIGA

## 2022-10-27 NOTE — TELEPHONE ENCOUNTER
Upon review of the In Basket request we have found as a result of outreach that patient did not have the requested item(s) completed  Any additional questions or concerns should be emailed to the Practice Liaisons via the appropriate education email address, please do not reply via In Basket      Thank you  Joanne Hickey

## 2022-11-08 ENCOUNTER — HOSPITAL ENCOUNTER (OUTPATIENT)
Dept: RADIOLOGY | Facility: HOSPITAL | Age: 57
Discharge: HOME | End: 2022-11-08
Attending: FAMILY MEDICINE

## 2022-11-08 DIAGNOSIS — F17.211 NICOTINE DEPENDENCE, CIGARETTES, IN REMISSION: ICD-10-CM

## 2022-11-08 DIAGNOSIS — Z12.2 ENCOUNTER FOR SCREENING FOR LUNG CANCER: ICD-10-CM

## 2022-11-10 DIAGNOSIS — R35.0 URINE FREQUENCY: Primary | ICD-10-CM

## 2022-11-10 RX ORDER — TAMSULOSIN HYDROCHLORIDE 0.4 MG/1
0.4 CAPSULE ORAL
Qty: 30 CAPSULE | Refills: 0 | Status: SHIPPED | OUTPATIENT
Start: 2022-11-10 | End: 2023-06-09

## 2022-11-11 LAB — PSA SERPL-MCNC: 1.4 NG/ML (ref 0–4)

## 2022-11-15 ENCOUNTER — TELEPHONE (OUTPATIENT)
Dept: OBGYN CLINIC | Facility: CLINIC | Age: 57
End: 2022-11-15

## 2022-11-15 DIAGNOSIS — Z96.641 STATUS POST TOTAL REPLACEMENT OF RIGHT HIP: ICD-10-CM

## 2022-11-15 DIAGNOSIS — Z47.1 AFTERCARE FOLLOWING RIGHT HIP JOINT REPLACEMENT SURGERY: ICD-10-CM

## 2022-11-15 DIAGNOSIS — Z96.641 AFTERCARE FOLLOWING RIGHT HIP JOINT REPLACEMENT SURGERY: ICD-10-CM

## 2022-11-15 RX ORDER — AMOXICILLIN 500 MG/1
2000 TABLET, FILM COATED ORAL
Qty: 12 TABLET | Refills: 2 | Status: SHIPPED | OUTPATIENT
Start: 2022-11-15 | End: 2023-11-15

## 2022-11-15 NOTE — TELEPHONE ENCOUNTER
Pt called for a refill of amoxicillin for his dental visit tomorrow  Pt is requesting 4 refills be sent because he is anticipating root canal(s) to be done in the coming weeks    # [phone]

## 2022-12-08 DIAGNOSIS — M1A.49X0 OTHER SECONDARY CHRONIC GOUT OF MULTIPLE SITES WITHOUT TOPHUS: ICD-10-CM

## 2022-12-08 DIAGNOSIS — I10 BENIGN HYPERTENSION: ICD-10-CM

## 2022-12-08 DIAGNOSIS — E78.2 MIXED HYPERLIPIDEMIA: ICD-10-CM

## 2022-12-08 RX ORDER — ALLOPURINOL 300 MG/1
300 TABLET ORAL DAILY
Qty: 90 TABLET | Refills: 0 | Status: SHIPPED | OUTPATIENT
Start: 2022-12-08

## 2022-12-08 RX ORDER — LISINOPRIL 2.5 MG/1
2.5 TABLET ORAL DAILY
Qty: 90 TABLET | Refills: 0 | Status: SHIPPED | OUTPATIENT
Start: 2022-12-08

## 2022-12-08 RX ORDER — SIMVASTATIN 20 MG
20 TABLET ORAL DAILY
Qty: 90 TABLET | Refills: 0 | Status: SHIPPED | OUTPATIENT
Start: 2022-12-08

## 2023-02-21 ENCOUNTER — VBI (OUTPATIENT)
Dept: ADMINISTRATIVE | Facility: OTHER | Age: 58
End: 2023-02-21

## 2023-03-29 ENCOUNTER — RA CDI HCC (OUTPATIENT)
Dept: OTHER | Facility: HOSPITAL | Age: 58
End: 2023-03-29

## 2023-03-29 NOTE — PROGRESS NOTES
[facility]  coding opportunities       Chart reviewed, no opportunity found: CHART REVIEWED, NO OPPORTUNITY FOUND        Patients Insurance        Commercial Insurance: Katlyn Markham

## 2023-04-07 ENCOUNTER — OFFICE VISIT (OUTPATIENT)
Dept: FAMILY MEDICINE CLINIC | Facility: CLINIC | Age: 58
End: 2023-04-07

## 2023-04-07 VITALS
RESPIRATION RATE: 16 BRPM | SYSTOLIC BLOOD PRESSURE: 120 MMHG | HEIGHT: 69 IN | HEART RATE: 66 BPM | OXYGEN SATURATION: 99 % | BODY MASS INDEX: 32.14 KG/M2 | TEMPERATURE: 98 F | WEIGHT: 217 LBS | DIASTOLIC BLOOD PRESSURE: 76 MMHG

## 2023-04-07 DIAGNOSIS — E11.9 TYPE 2 DIABETES MELLITUS WITHOUT COMPLICATION, WITHOUT LONG-TERM CURRENT USE OF INSULIN (HCC): ICD-10-CM

## 2023-04-07 DIAGNOSIS — I10 BENIGN HYPERTENSION: ICD-10-CM

## 2023-04-07 DIAGNOSIS — N40.1 BENIGN PROSTATIC HYPERPLASIA WITH LOWER URINARY TRACT SYMPTOMS, SYMPTOM DETAILS UNSPECIFIED: Primary | ICD-10-CM

## 2023-04-07 DIAGNOSIS — Z01.818 PREOP TESTING: ICD-10-CM

## 2023-04-07 RX ORDER — DIAZEPAM 5 MG/1
TABLET ORAL
COMMUNITY
Start: 2023-02-08 | End: 2023-04-07

## 2023-04-07 RX ORDER — OXYCODONE HYDROCHLORIDE AND ACETAMINOPHEN 5; 325 MG/1; MG/1
TABLET ORAL
COMMUNITY
Start: 2023-02-08 | End: 2023-04-07

## 2023-04-07 RX ORDER — ALLOPURINOL 100 MG/1
TABLET ORAL
COMMUNITY
Start: 2023-03-27 | End: 2023-04-07

## 2023-04-07 NOTE — PROGRESS NOTES
PRE-OPERATIVE EVALUATION  [facility]YS[facility]IAN GROUP [facility]    NAME: Mohit Lay  AGE: 62 y o  SEX: male  : 1965   DATE: 2023    Pre-Operative Evaluation      Chief Complaint: Pre-operative Evaluation     Surgery: [facility] to help with BPH  Anticipated Date of Surgery:   Referring Provider: Dr Wilfred Carmichael    History of Present Illness:     Mohit Lay is a 62 y o  male who presents to the office today for a preoperative Planned anesthesia is general Patient has not had any complications with anesthesia in the past  Patient  does not have a bleeding risk  Assessment of Chronic Conditions:   - Hypertension: Lisinppril 2 5mgs doing well    DM diet controlled, working on his diet daily     Assessment of Cardiac Risk:  Denies unstable or severe angina or MI in the last 6 weeks or history of stent placement in the last year   Denies decompensated heart failure (e g  New onset heart failure, NYHA functional class IV heart failure, or worsening existing heart failure)  Denies significant arrhythmias such as high grade AV block, symptomatic ventricular arrhythmia, newly recognized ventricular tachycardia, supraventricular tachycardia with resting heart rate >100, or symptomatic bradycardia  Denies severe heart valve disease including aortic stenosis or symptomatic mitral stenosis     Exercise Capacity:  Able to walk 4 blocks without symptoms?: Yes  Able to walk 2 flights without symptoms?: Yes      Prior Anesthesia Reactions: No     Personal history of venous thromboembolic disease? No    History of steroid use for >2 weeks within last year? No      Review of Systems:     Review of Systems   Constitutional: Negative for activity change, appetite change, chills, fatigue and fever  HENT: Negative for congestion  Respiratory: Negative for cough, chest tightness and shortness of breath  Cardiovascular: Negative for chest pain and leg swelling     Gastrointestinal: Negative for abdominal distention, abdominal pain, constipation, diarrhea, nausea and vomiting  All other systems reviewed and are negative  Chest pain: no   Shortness of breath: no  Shortness of breath with exertion: no  Orthopnea: no  Dizziness: no; at times with Flomax  Unexplained weight change: no    Current Problem List:     Patient Active Problem List   Diagnosis   • Arthralgia of multiple joints   • Benign hypertension   • Gout   • Hyperlipidemia   • Lumbar radiculopathy   • Hypercalcemia   • Chronic uveitis of both eyes   • Family history of melanoma   • Diabetes mellitus, type 2 (HCC)   • Status post total replacement of right hip       Allergies:     No Known Allergies    Current Medications:       Current Outpatient Medications:   •  allopurinol (ZYLOPRIM) 300 mg tablet, Take 1 tablet (300 mg total) by mouth daily, Disp: 90 tablet, Rfl: 0  •  amoxicillin (AMOXIL) 500 MG tablet, Take 4 tablets (2,000 mg total) by mouth 60 minutes pre-procedure, Disp: 12 tablet, Rfl: 2  •  lisinopril (ZESTRIL) 2 5 mg tablet, Take 1 tablet (2 5 mg total) by mouth daily, Disp: 90 tablet, Rfl: 0  •  simvastatin (ZOCOR) 20 mg tablet, Take 1 tablet (20 mg total) by mouth daily, Disp: 90 tablet, Rfl: 0  •  tamsulosin (FLOMAX) 0 4 mg, Take 1 capsule (0 4 mg total) by mouth daily with dinner, Disp: 30 capsule, Rfl: 0    Past Medical History:       Past Medical History:   Diagnosis Date   • Diabetes mellitus ([facility] [address] )     type 2 diet controlled   • Gout    • Hypercholesteremia    • Hypertension         Past Surgical History:   Procedure Laterality Date   • APPENDECTOMY     • ARTHROPLASTY HIP TOTAL ANTERIOR Right 7/26/2022    Procedure: ARTHROPLASTY HIP TOTAL ANTERIOR, NAVIGATED;  Surgeon: Goyo Rendon DO;  Location: [address] [place] [address];  Service: Orthopedics   • COLONOSCOPY N/A 8/31/2016    Procedure: COLONOSCOPY;  Surgeon: Tammy Hagan MD;  Location: [place] [address] GI LAB;   Service:    • [place] INJECTION RIGHT HIP (NON ARTHROGRAM)  3/10/2022   • [place] ARTHROCENTESIS ASPIR&/INJ MAJOR JT/BURSA W/O US Right 3/10/2022    Procedure: Hip intra-articular cortisone injection ( [id number] ); Surgeon: Esdras Hernandez MD;  Location: [facility] MAIN OR;  Service: Pain Management         Family History   Problem Relation Age of Onset   • Diabetes Mother            • Diabetes Father            • Cancer Father    • Diabetes Sister    • Colon cancer Family         Social History     Socioeconomic History   • Marital status: /Civil Union     Spouse name: Not on file   • Number of children: Not on file   • Years of education: Not on file   • Highest education level: Not on file   Occupational History   • Not on file   Tobacco Use   • Smoking status: Former     Packs/day: 2 00     Years: 15 00     Pack years: 30 00     Types: Cigarettes     Start date: 1980     Quit date: 2008     Years since quittin 6   • Smokeless tobacco: Never   • Tobacco comments:     quit 10 years ago   Vaping Use   • Vaping Use: Never used   Substance and Sexual Activity   • Alcohol use: Yes     Alcohol/week: 15 0 standard drinks     Types: 5 Cans of beer, 10 Standard drinks or equivalent per week     Comment: beer two to three times per week /occasional    • Drug use: No   • Sexual activity: Yes     Partners: Female     Birth control/protection: Male Sterilization   Other Topics Concern   • Not on file   Social History Narrative    Caffeine use      Social Determinants of Health     Financial Resource Strain: Not on file   Food Insecurity: Not on file   Transportation Needs: Not on file   Physical Activity: Not on file   Stress: Not on file   Social Connections: Not on file   Intimate Partner Violence: Not on file   Housing Stability: Not on file        Physical Exam:     Vitals:    23 0911   BP: 120/76   Pulse: 66   Resp: 16   Temp: 98 °F (36 7 °C)   SpO2: 99%     Physical Exam  Constitutional:       Appearance: He is well-developed     HENT:      Head: Normocephalic and atraumatic  Right Ear: External ear normal       Left Ear: External ear normal       Nose: Nose normal       Mouth/Throat:      Pharynx: No oropharyngeal exudate  Eyes:      Conjunctiva/sclera: Conjunctivae normal       Pupils: Pupils are equal, round, and reactive to light  Cardiovascular:      Rate and Rhythm: Normal rate and regular rhythm  Pulses: Intact distal pulses  Heart sounds: Normal heart sounds  Pulmonary:      Effort: Pulmonary effort is normal       Breath sounds: Normal breath sounds  Abdominal:      General: Bowel sounds are normal       Palpations: Abdomen is soft  Tenderness: There is no abdominal tenderness  Musculoskeletal:         General: Normal range of motion  Cervical back: Normal range of motion and neck supple  Skin:     General: Skin is warm and dry  Neurological:      Mental Status: He is alert and oriented to person, place, and time  Motor: No abnormal muscle tone  Psychiatric:         Behavior: Behavior normal          Thought Content: Thought content normal          Judgment: Judgment normal          Pre-Op Evaluation Assessment  62 y o  male with planned surgery: [facility] to help with BPH      Recommendations:  Ander Zhou is undergoing an elective Low Risk surgery  They are at [address] risk for major adverse cardiac event (MACE)  They may proceed with surgery as planned without further workup  Hypertension very well controlled  Continue on treatment  Type 2 DM, diet controlled

## 2023-04-25 LAB
LEFT EYE DIABETIC RETINOPATHY: NORMAL
RIGHT EYE DIABETIC RETINOPATHY: NORMAL

## 2023-04-25 PROCEDURE — 2023F DILAT RTA XM W/O RTNOPTHY: CPT | Performed by: FAMILY MEDICINE

## 2023-05-30 ENCOUNTER — TELEPHONE (OUTPATIENT)
Dept: ADMINISTRATIVE | Facility: OTHER | Age: 58
End: 2023-05-30

## 2023-05-30 NOTE — TELEPHONE ENCOUNTER
----- Message from Ander Pérez MD sent at 4/7/2023  9:33 AM EDT -----  Regarding: care gap request  04/07/23 9:33 AM    Hello, our patient attached above has had Diabetic Eye Exam completed/performed   Please assist in updating the patient chart by making an External outreach to [facility]    Thank you,  Meggan Gtz  [facility]

## 2023-05-30 NOTE — LETTER
Diabetic Eye Exam Form    Date Requested: 23  Patient: Andrew Soria  Patient : 1965   Referring Provider: Mathieu Beck MD      DIABETIC Eye Exam Date _______________________________      Type of Exam MUST be documented for Diabetic Eye Exams  Please CHECK ONE  Retinal Exam       Dilated Retinal Exam       OCT       Optomap-Iris Exam      Fundus Photography       Left Eye - Please check Retinopathy or No Retinopathy        Exam did show retinopathy    Exam did not show retinopathy       Right Eye - Please check Retinopathy or No Retinopathy       Exam did show retinopathy    Exam did not show retinopathy       Comments __________________________________________________________    Practice Providing Exam ______________________________________________    Exam Performed By (print name) _______________________________________      Provider Signature ___________________________________________________      These reports are needed for  compliance  Please fax this completed form and a copy of the Diabetic Eye Exam report to our office located at [place] [address] as soon as possible via Fax [phone] cindy Hernadez: Phone [phone]  We thank you for your assistance in treating our mutual patient

## 2023-05-30 NOTE — TELEPHONE ENCOUNTER
Upon review of the In Basket request and the patient's chart, initial outreach has been made via fax to facility  Please see Contacts section for details       Thank you  Zee Dorsey MA

## 2023-06-09 ENCOUNTER — OFFICE VISIT (OUTPATIENT)
Dept: FAMILY MEDICINE CLINIC | Facility: CLINIC | Age: 58
End: 2023-06-09
Payer: COMMERCIAL

## 2023-06-09 VITALS
HEIGHT: 69 IN | HEART RATE: 78 BPM | DIASTOLIC BLOOD PRESSURE: 80 MMHG | RESPIRATION RATE: 16 BRPM | TEMPERATURE: 98.5 F | SYSTOLIC BLOOD PRESSURE: 140 MMHG | OXYGEN SATURATION: 97 % | WEIGHT: 216 LBS | BODY MASS INDEX: 31.99 KG/M2

## 2023-06-09 DIAGNOSIS — E11.9 TYPE 2 DIABETES MELLITUS WITHOUT COMPLICATION, WITHOUT LONG-TERM CURRENT USE OF INSULIN (HCC): Primary | ICD-10-CM

## 2023-06-09 DIAGNOSIS — I10 BENIGN HYPERTENSION: ICD-10-CM

## 2023-06-09 DIAGNOSIS — Z80.8 FAMILY HISTORY OF MELANOMA: ICD-10-CM

## 2023-06-09 DIAGNOSIS — M1A.49X0 OTHER SECONDARY CHRONIC GOUT OF MULTIPLE SITES WITHOUT TOPHUS: ICD-10-CM

## 2023-06-09 LAB — SL AMB POCT HEMOGLOBIN AIC: 6.5 (ref ?–6.5)

## 2023-06-09 PROCEDURE — 99214 OFFICE O/P EST MOD 30 MIN: CPT | Performed by: FAMILY MEDICINE

## 2023-06-09 PROCEDURE — 83036 HEMOGLOBIN GLYCOSYLATED A1C: CPT | Performed by: FAMILY MEDICINE

## 2023-06-09 NOTE — PROGRESS NOTES
Roxanna Pedersen 1965 male MRN: [id number]    FAMILY PRACTICE OFFICE VISIT  [facility]      ASSESSMENT/PLAN  Roxanna Pedersen is a 62 y o  male presents to the office for    Diagnoses and all orders for this visit:    Type 2 diabetes mellitus without complication, without long-term current use of insulin (HCC)  -     POCT hemoglobin A1c    Benign hypertension    Other secondary chronic gout of multiple sites without tophus    Family history of melanoma    Other orders  -     Discontinue: acetaminophen-codeine (TYLENOL #3) 300-30 mg per tablet;  (Patient not taking: Reported on 6/9/2023)       Currently diabetes is stable; need for treatment at this time  Did advise the patient that if there is an elevation in his A1c at the next appointment we will likely discuss being on a medication for precautions  No changes to gout medication today  Will be due for all screening labs at her next appointment  Hypertension currently above goal but patient just took his medications  Currently asymptomatic  Please SEE DERMATOLOGY GIVEN FAMILY HISTORY  BMI Counseling: Body mass index is 31 9 kg/m²  The BMI is above normal  Nutrition recommendations include consuming healthier snacks and limiting drinks that contain sugar  Exercise recommendations include exercising 3-5 times per week  Rationale for BMI follow-up plan is due to patient being overweight or obese  Depression Screening and Follow-up Plan: Patient was screened for depression during today's encounter  They screened negative with a PHQ-2 score of 0  Future Appointments   Date Time Provider Jazmine Hamlin   7/28/2023  9:00 AM Stella Lopez DO [facility]          SUBJECTIVE  CC: Diabetes      HPI:  Roxanna Pedersen is a 62 y o  male who presents for a chronic follow-up  Patient is a type II diabetic with a strong family history of diabetes  Currently diet controlled    Patient states that he is being very active through golf and has been mindful of what he has been eating  Patient states his gout has been very controlled on allopurinol and diet controlled  Does not have a personal history of melanoma but does have a family history  Patient has not seen the dermatologist in over 2 years  Patient states that his blood pressure is usually well controlled states that he took the pill only a short period of time prior to this appointment today  Denies any acute concerns today      Review of Systems   Constitutional: Negative for activity change, appetite change, chills, fatigue and fever  HENT: Negative for congestion  Respiratory: Negative for cough, chest tightness and shortness of breath  Cardiovascular: Negative for chest pain and leg swelling  Gastrointestinal: Negative for abdominal distention, abdominal pain, constipation, diarrhea, nausea and vomiting  All other systems reviewed and are negative        Historical Information   The patient history was reviewed as follows:  Past Medical History:   Diagnosis Date   • Arthritis    • Diabetes mellitus ([facility] [address] )     type 2 diet controlled   • Gout    • Hypercholesteremia    • Hypertension    • Shingles          Medications:     Current Outpatient Medications:   •  allopurinol (ZYLOPRIM) 300 mg tablet, Take 1 tablet (300 mg total) by mouth daily, Disp: 90 tablet, Rfl: 0  •  amoxicillin (AMOXIL) 500 MG tablet, Take 4 tablets (2,000 mg total) by mouth 60 minutes pre-procedure, Disp: 12 tablet, Rfl: 2  •  lisinopril (ZESTRIL) 2 5 mg tablet, Take 1 tablet (2 5 mg total) by mouth daily, Disp: 90 tablet, Rfl: 0  •  simvastatin (ZOCOR) 20 mg tablet, Take 1 tablet (20 mg total) by mouth daily, Disp: 90 tablet, Rfl: 0    No Known Allergies    OBJECTIVE  Vitals:   Vitals:    06/09/23 0829   BP: 140/80   BP Location: Left arm   Patient Position: Sitting   Cuff Size: Large   Pulse: 78   Resp: 16   Temp: 98 5 °F (36 9 °C)   SpO2: 97%   Weight: 98 kg (216 lb) "  Height: 5' 9\" (1 753 m)         Physical Exam  Vitals reviewed  Constitutional:       Appearance: He is well-developed  HENT:      Head: Normocephalic and atraumatic  Eyes:      Conjunctiva/sclera: Conjunctivae normal       Pupils: Pupils are equal, round, and reactive to light  Cardiovascular:      Rate and Rhythm: Normal rate and regular rhythm  Heart sounds: Normal heart sounds  Pulmonary:      Effort: Pulmonary effort is normal  No respiratory distress  Breath sounds: Normal breath sounds  Musculoskeletal:         General: Normal range of motion  Cervical back: Normal range of motion and neck supple  Skin:     General: Skin is warm  Capillary Refill: Capillary refill takes less than 2 seconds  Neurological:      Mental Status: He is alert and oriented to person, place, and time                      Kirit Hameed MD,   [facility]  6/11/2023      "

## 2023-06-15 NOTE — TELEPHONE ENCOUNTER
Upon review of the In Basket request we were able to locate, review, and update the patient chart as requested for Diabetic Eye Exam     Any additional questions or concerns should be emailed to the Practice Liaisons via the appropriate education email address, please do not reply via In Basket      Thank you  Svetlana Gillis MA

## 2023-07-10 DIAGNOSIS — M1A.49X0 OTHER SECONDARY CHRONIC GOUT OF MULTIPLE SITES WITHOUT TOPHUS: ICD-10-CM

## 2023-07-10 DIAGNOSIS — E78.2 MIXED HYPERLIPIDEMIA: ICD-10-CM

## 2023-07-10 DIAGNOSIS — I10 BENIGN HYPERTENSION: ICD-10-CM

## 2023-07-11 RX ORDER — LISINOPRIL 2.5 MG/1
2.5 TABLET ORAL DAILY
Qty: 90 TABLET | Refills: 3 | Status: SHIPPED | OUTPATIENT
Start: 2023-07-11

## 2023-07-11 RX ORDER — ALLOPURINOL 300 MG/1
300 TABLET ORAL DAILY
Qty: 90 TABLET | Refills: 3 | Status: SHIPPED | OUTPATIENT
Start: 2023-07-11

## 2023-07-11 RX ORDER — SIMVASTATIN 20 MG
20 TABLET ORAL DAILY
Qty: 90 TABLET | Refills: 3 | Status: SHIPPED | OUTPATIENT
Start: 2023-07-11

## 2023-07-28 ENCOUNTER — OFFICE VISIT (OUTPATIENT)
Dept: OBGYN CLINIC | Facility: CLINIC | Age: 58
End: 2023-07-28
Payer: COMMERCIAL

## 2023-07-28 ENCOUNTER — APPOINTMENT (OUTPATIENT)
Dept: RADIOLOGY | Facility: CLINIC | Age: 58
End: 2023-07-28
Payer: COMMERCIAL

## 2023-07-28 VITALS
HEIGHT: 69 IN | SYSTOLIC BLOOD PRESSURE: 136 MMHG | BODY MASS INDEX: 31.99 KG/M2 | DIASTOLIC BLOOD PRESSURE: 87 MMHG | WEIGHT: 216 LBS | HEART RATE: 72 BPM

## 2023-07-28 DIAGNOSIS — Z96.641 STATUS POST TOTAL REPLACEMENT OF RIGHT HIP: ICD-10-CM

## 2023-07-28 DIAGNOSIS — Z96.641 STATUS POST TOTAL REPLACEMENT OF RIGHT HIP: Primary | ICD-10-CM

## 2023-07-28 PROCEDURE — 73502 X-RAY EXAM HIP UNI 2-3 VIEWS: CPT

## 2023-07-28 PROCEDURE — 99214 OFFICE O/P EST MOD 30 MIN: CPT | Performed by: ORTHOPAEDIC SURGERY

## 2023-07-28 NOTE — PROGRESS NOTES
Assessment/Plan:  1. Status post total replacement of right hip  XR hip/pelv 2-3 vws right if performed        Scribe Attestation    I,:  Leopoldo Butcher am acting as a scribe while in the presence of the attending physician.:       I,:  Luma Krueger, DO personally performed the services described in this documentation    as scribed in my presence.:         Francia Plasencia is a pleasant 70-year-old gentleman who returns today for follow-up evaluation 1 year status post right total hip arthroplasty. I am very pleased with his imaging and his clinical presentation today in the office. He may continue with activity to his tolerance. He understands that he requires prophylactic antibiotics prior to any dental procedure. We will plan to see him back as needed. Subjective: Follow-up evaluation 1 year status post right total hip arthroplasty    Patient ID: Angel Verdugo is a 62 y.o. male who returns today for follow-up evaluation 1 year status post right total hip arthroplasty. He reports that he has been doing very well. He has been participating in all desired activity of daily living and enjoyment without limitation. He denies any pain in his right hip or groin. He also reports that he has experienced a resolution of right knee pain that he felt prior to his surgery. He is very pleased that he has been able to golf. He denies any new injury or trauma. Review of Systems   Constitutional: Negative for activity change, chills, fever and unexpected weight change. HENT: Negative for hearing loss, nosebleeds and sore throat. Eyes: Negative for pain, redness and visual disturbance. Respiratory: Negative for cough, shortness of breath and wheezing. Cardiovascular: Negative for chest pain, palpitations and leg swelling. Gastrointestinal: Negative for abdominal pain, nausea and vomiting. Endocrine: Negative for polyphagia and polyuria. Genitourinary: Negative for dysuria and hematuria. Musculoskeletal: Negative for arthralgias, joint swelling and myalgias. See HPI   Skin: Negative for rash and wound. Neurological: Negative for dizziness, numbness and headaches. Psychiatric/Behavioral: Negative for decreased concentration and suicidal ideas. The patient is not nervous/anxious. Past Medical History:   Diagnosis Date   • Arthritis    • Diabetes mellitus ([address])     type 2 diet controlled   • Gout    • Hypercholesteremia    • Hypertension    • Shingles        Past Surgical History:   Procedure Laterality Date   • APPENDECTOMY     • ARTHROPLASTY HIP TOTAL ANTERIOR Right 2022    Procedure: ARTHROPLASTY HIP TOTAL ANTERIOR, NAVIGATED;  Surgeon: Colin Ramirez DO;  Location: [facility];  Service: Orthopedics   • COLONOSCOPY N/A 2016    Procedure: COLONOSCOPY;  Surgeon: Sayda Mcfadden MD;  Location: [address] [place] [address] GI LAB; Service:    • FL INJECTION RIGHT HIP (NON ARTHROGRAM)  03/10/2022   • JOINT REPLACEMENT  Right Hip   • [place] ARTHROCENTESIS ASPIR&/INJ MAJOR JT/BURSA W/O US Right 03/10/2022    Procedure: Hip intra-articular cortisone injection ( [id number] ); Surgeon: Wong Cotter MD;  Location: [facility] MAIN OR;  Service: Pain Management        Family History   Problem Relation Age of Onset   • Diabetes Mother            • Diabetes Father            • Cancer Father    • Diabetes Sister    • Colon cancer Family        Social History     Occupational History   • Not on file   Tobacco Use   • Smoking status: Former     Packs/day: 2.00     Years: 15.00     Total pack years: 30.00     Types: Cigarettes     Start date: 1980     Quit date: 2008     Years since quittin.9   • Smokeless tobacco: Never   • Tobacco comments:     quit 10 years ago   Vaping Use   • Vaping Use: Never used   Substance and Sexual Activity   • Alcohol use:  Yes     Alcohol/week: 15.0 standard drinks of alcohol     Types: 5 Cans of beer, 10 Standard drinks or equivalent per week Comment: beer two to three times per week /occasional    • Drug use: No   • Sexual activity: Yes     Partners: Female     Birth control/protection: Male Sterilization         Current Outpatient Medications:   •  allopurinol (ZYLOPRIM) 300 mg tablet, Take 1 tablet (300 mg total) by mouth daily, Disp: 90 tablet, Rfl: 3  •  amoxicillin (AMOXIL) 500 MG tablet, Take 4 tablets (2,000 mg total) by mouth 60 minutes pre-procedure, Disp: 12 tablet, Rfl: 2  •  lisinopril (ZESTRIL) 2.5 mg tablet, Take 1 tablet (2.5 mg total) by mouth daily, Disp: 90 tablet, Rfl: 3  •  simvastatin (ZOCOR) 20 mg tablet, Take 1 tablet (20 mg total) by mouth daily, Disp: 90 tablet, Rfl: 3    No Known Allergies    Objective:  Vitals:    07/28/23 0913   BP: 136/87   Pulse: 72       Body mass index is 31.9 kg/m². Right Hip Exam     Tenderness   The patient is experiencing no tenderness. Range of Motion   Abduction: 45   Adduction: 30   Extension: 0   Flexion: 120   External rotation: 50   Internal rotation: 25     Muscle Strength   Abduction: 5/5   Adduction: 5/5   Flexion: 5/5     Tests   GINGER: negative    Other   Erythema: absent  Sensation: normal  Pulse: present    Comments:  Anterior hip incision well healed             Physical Exam  Vitals and nursing note reviewed. Constitutional:       Appearance: Normal appearance. He is well-developed. HENT:      Head: Normocephalic and atraumatic. Right Ear: External ear normal.      Left Ear: External ear normal.      Nose: Nose normal.   Eyes:      General: No scleral icterus. Extraocular Movements: Extraocular movements intact. Conjunctiva/sclera: Conjunctivae normal.   Cardiovascular:      Rate and Rhythm: Normal rate. Pulmonary:      Effort: Pulmonary effort is normal. No respiratory distress. Musculoskeletal:      Cervical back: Normal range of motion and neck supple. Comments: See Ortho exam   Skin:     General: Skin is warm and dry.    Neurological:      General: No focal deficit present. Mental Status: He is alert and oriented to person, place, and time. Psychiatric:         Behavior: Behavior normal.         I have personally reviewed pertinent films in PACS. X-ray of the right hip obtained on 7/20/2023 reviewed demonstrating a well-positioned and aligned total hip arthroplasty without evidence of failure. There is no new fracture, dislocation, lytic or blastic lesion. This document was created using speech voice recognition software. Grammatical errors, random word insertions, pronoun errors, and incomplete sentences are an occasional consequence of this system due to software limitations, ambient noise, and hardware issues. Any formal questions or concerns about content, text, or information contained within the body of this dictation should be directly addressed to the provider for clarification.

## 2023-08-09 ENCOUNTER — OFFICE VISIT (OUTPATIENT)
Dept: FAMILY MEDICINE CLINIC | Facility: CLINIC | Age: 58
End: 2023-08-09
Payer: COMMERCIAL

## 2023-08-09 VITALS
RESPIRATION RATE: 14 BRPM | WEIGHT: 216 LBS | DIASTOLIC BLOOD PRESSURE: 82 MMHG | HEIGHT: 69 IN | SYSTOLIC BLOOD PRESSURE: 130 MMHG | HEART RATE: 72 BPM | BODY MASS INDEX: 31.99 KG/M2 | OXYGEN SATURATION: 97 % | TEMPERATURE: 98.1 F

## 2023-08-09 DIAGNOSIS — R10.13 EPIGASTRIC PAIN: ICD-10-CM

## 2023-08-09 DIAGNOSIS — K52.9 GASTROENTERITIS: Primary | ICD-10-CM

## 2023-08-09 DIAGNOSIS — I10 BENIGN HYPERTENSION: ICD-10-CM

## 2023-08-09 PROCEDURE — 99214 OFFICE O/P EST MOD 30 MIN: CPT | Performed by: STUDENT IN AN ORGANIZED HEALTH CARE EDUCATION/TRAINING PROGRAM

## 2023-08-09 RX ORDER — PANTOPRAZOLE SODIUM 40 MG/1
40 TABLET, DELAYED RELEASE ORAL DAILY
Qty: 30 TABLET | Refills: 0 | Status: SHIPPED | OUTPATIENT
Start: 2023-08-09

## 2023-08-09 NOTE — PROGRESS NOTES
Clinic Visit Note  Wally Bryan 62 y.o. male   MRN: [id number]    Assessment and Plan      Diagnoses and all orders for this visit:    Gastroenteritis  Symptoms consistent with viral gastroenteritis, recommend trial of PPI therapy, increase oral hydration, advance diet as tolerated. Given symptoms lasting greater than 72 hours recommend blood work to rule out other acute pathology. Follow-up after blood work completed. If symptoms worsen or not improving reevaluation recommended. -     Comprehensive metabolic panel; Future  -     CBC and differential; Future  -     Lipase; Future  -     Comprehensive metabolic panel  -     CBC and differential  -     Lipase  -     pantoprazole (PROTONIX) 40 mg tablet; Take 1 tablet (40 mg total) by mouth daily    Epigastric pain  -     Comprehensive metabolic panel; Future  -     CBC and differential; Future  -     Lipase; Future  -     Comprehensive metabolic panel  -     CBC and differential  -     Lipase  -     pantoprazole (PROTONIX) 40 mg tablet; Take 1 tablet (40 mg total) by mouth daily    Benign hypertension  Blood pressure/vitals appropriate on exam, no medication changes    My impressions and treatment recommendations were discussed in detail with the patient who verbalized understanding and had no further questions. Discharge instructions were provided. Subjective     Chief Complaint: Acute care visit    History of Present Illness:    Patient is a pleasant 78-year-old male acute care visit secondary to epigastric pain/stomach ache that has been going on since Sunday, has tried conservative treatment, OTC Pepto-Bismol with minimal relief. Patient denies chest pain, shortness of breath, no diarrhea, regular bowel movements.     The following portions of the patient's history were reviewed and updated as appropriate: allergies, current medications, past family history, past medical history, past social history, past surgical history and problem list.    REVIEW OF SYSTEMS:  A complete 12-point review of systems is negative other than that noted in the HPI. Review of Systems   Constitutional: Negative for chills, fatigue and fever. HENT: Negative for congestion and sore throat. Eyes: Negative for pain and visual disturbance. Respiratory: Negative for shortness of breath and wheezing. Cardiovascular: Negative for chest pain and palpitations. Gastrointestinal: Positive for abdominal pain and nausea. Negative for constipation, diarrhea and vomiting. Genitourinary: Negative for dysuria and frequency. Musculoskeletal: Negative for back pain and neck pain. Skin: Negative for color change and rash. Neurological: Negative for dizziness and headaches. Psychiatric/Behavioral: Negative for agitation and confusion. All other systems reviewed and are negative.         Current Outpatient Medications:   •  allopurinol (ZYLOPRIM) 300 mg tablet, Take 1 tablet (300 mg total) by mouth daily, Disp: 90 tablet, Rfl: 3  •  lisinopril (ZESTRIL) 2.5 mg tablet, Take 1 tablet (2.5 mg total) by mouth daily, Disp: 90 tablet, Rfl: 3  •  pantoprazole (PROTONIX) 40 mg tablet, Take 1 tablet (40 mg total) by mouth daily, Disp: 30 tablet, Rfl: 0  •  simvastatin (ZOCOR) 20 mg tablet, Take 1 tablet (20 mg total) by mouth daily, Disp: 90 tablet, Rfl: 3  •  amoxicillin (AMOXIL) 500 MG tablet, Take 4 tablets (2,000 mg total) by mouth 60 minutes pre-procedure (Patient not taking: Reported on 8/9/2023), Disp: 12 tablet, Rfl: 2  Past Medical History:   Diagnosis Date   • Arthritis    • Diabetes mellitus ([address])     type 2 diet controlled   • Gout    • Hypercholesteremia    • Hypertension    • Shingles      Past Surgical History:   Procedure Laterality Date   • APPENDECTOMY     • ARTHROPLASTY HIP TOTAL ANTERIOR Right 07/26/2022    Procedure: ARTHROPLASTY HIP TOTAL ANTERIOR, NAVIGATED;  Surgeon: Opal Albarado DO;  Location: [facility];  Service: Orthopedics   • COLONOSCOPY N/A 08/31/2016 Procedure: COLONOSCOPY;  Surgeon: Ron Myers MD;  Location: [address] [place] [address] GI LAB; Service:    • FL INJECTION RIGHT HIP (NON ARTHROGRAM)  03/10/2022   • JOINT REPLACEMENT  Right Hip   • [place] ARTHROCENTESIS ASPIR&/INJ MAJOR JT/BURSA W/O US Right 03/10/2022    Procedure: Hip intra-articular cortisone injection ( [id number] ); Surgeon: Radha Chan MD;  Location: [facility] MAIN OR;  Service: Pain Management      Social History     Socioeconomic History   • Marital status: /Civil Union     Spouse name: Not on file   • Number of children: Not on file   • Years of education: Not on file   • Highest education level: Not on file   Occupational History   • Not on file   Tobacco Use   • Smoking status: Former     Packs/day: 2.00     Years: 15.00     Total pack years: 30.00     Types: Cigarettes     Start date: 1980     Quit date: 2008     Years since quittin.9   • Smokeless tobacco: Never   • Tobacco comments:     quit 10 years ago   Vaping Use   • Vaping Use: Never used   Substance and Sexual Activity   • Alcohol use:  Yes     Alcohol/week: 15.0 standard drinks of alcohol     Types: 5 Cans of beer, 10 Standard drinks or equivalent per week     Comment: beer two to three times per week /occasional    • Drug use: No   • Sexual activity: Yes     Partners: Female     Birth control/protection: Male Sterilization   Other Topics Concern   • Not on file   Social History Narrative    Caffeine use      Social Determinants of Health     Financial Resource Strain: Not on file   Food Insecurity: Not on file   Transportation Needs: Not on file   Physical Activity: Not on file   Stress: Not on file   Social Connections: Not on file   Intimate Partner Violence: Not on file   Housing Stability: Not on file     Family History   Problem Relation Age of Onset   • Diabetes Mother            • Diabetes Father            • Cancer Father    • Diabetes Sister    • Colon cancer Family      No Known Allergies    Objective Vitals:    08/09/23 1010   BP: 130/82   BP Location: Left arm   Patient Position: Sitting   Cuff Size: Adult   Pulse: 72   Resp: 14   Temp: 98.1 °F (36.7 °C)   TempSrc: Temporal   SpO2: 97%   Weight: 98 kg (216 lb)   Height: 5' 9" (1.753 m)       Physical Exam:     GENERAL: NAD, pleasant   HEENT:  NC/AT, PERRL, EOMI, no scleral icterus  CARDIAC:  RRR, +S1/S2, no S3/S4 appreciated, no m/g/r  PULMONARY:  CTA B/L, no wheezing/rales/rhonci, non-labored breathing  ABDOMEN:  Soft, NT/ND, no rebound/guarding/rigidity  Extremities:. No edema, cyanosis, or clubbing  Musculoskeletal:  Full range of motion intact in all extremities   NEUROLOGIC: Grossly intact, no focal deficits  SKIN:  No rashes or erythema noted on exposed skin  Psych: Normal affect, mood stable    ==  PLEASE NOTE:  This encounter was completed utilizing the [employer]Flu[employer] Direct Speech Voice Recognition Software. Grammatical errors, random word insertions, pronoun errors and incomplete sentences are occasional consequences of the system due to software limitations, ambient noise and hardware issues. These may be missed by proof reading prior to affixing electronic signature. Any questions or concerns about the content, text or information contained within the body of this dictation should be directly addressed to the physician for clarification. Please do not hesitate to call me directly if you have any any questions or concerns.     DO [facility]   [facility]

## 2023-08-10 DIAGNOSIS — R10.13 EPIGASTRIC PAIN: ICD-10-CM

## 2023-08-10 DIAGNOSIS — R74.8 ELEVATED LIPASE: ICD-10-CM

## 2023-08-10 DIAGNOSIS — K85.90 ACUTE PANCREATITIS, UNSPECIFIED COMPLICATION STATUS, UNSPECIFIED PANCREATITIS TYPE: Primary | ICD-10-CM

## 2023-08-10 LAB
ALBUMIN SERPL-MCNC: 4.4 G/DL (ref 3.8–4.9)
ALBUMIN/GLOB SERPL: 2 {RATIO} (ref 1.2–2.2)
ALP SERPL-CCNC: 67 IU/L (ref 44–121)
ALT SERPL-CCNC: 19 IU/L (ref 0–44)
AST SERPL-CCNC: 19 IU/L (ref 0–40)
BASOPHILS # BLD AUTO: 0.1 X10E3/UL (ref 0–0.2)
BASOPHILS NFR BLD AUTO: 1 %
BILIRUB SERPL-MCNC: 0.8 MG/DL (ref 0–1.2)
BUN SERPL-MCNC: 8 MG/DL (ref 6–24)
BUN/CREAT SERPL: 8 (ref 9–20)
CALCIUM SERPL-MCNC: 11.1 MG/DL (ref 8.7–10.2)
CHLORIDE SERPL-SCNC: 102 MMOL/L (ref 96–106)
CO2 SERPL-SCNC: 24 MMOL/L (ref 20–29)
CREAT SERPL-MCNC: 1.06 MG/DL (ref 0.76–1.27)
EGFR: 82 ML/MIN/1.73
EOSINOPHIL # BLD AUTO: 0.2 X10E3/UL (ref 0–0.4)
EOSINOPHIL NFR BLD AUTO: 2 %
ERYTHROCYTE [DISTWIDTH] IN BLOOD BY AUTOMATED COUNT: 13.2 % (ref 11.6–15.4)
GLOBULIN SER-MCNC: 2.2 G/DL (ref 1.5–4.5)
GLUCOSE SERPL-MCNC: 135 MG/DL (ref 70–99)
HCT VFR BLD AUTO: 42.9 % (ref 37.5–51)
HGB BLD-MCNC: 14.8 G/DL (ref 13–17.7)
IMM GRANULOCYTES # BLD: 0 X10E3/UL (ref 0–0.1)
IMM GRANULOCYTES NFR BLD: 0 %
LIPASE SERPL-CCNC: 231 U/L (ref 13–78)
LYMPHOCYTES # BLD AUTO: 1.6 X10E3/UL (ref 0.7–3.1)
LYMPHOCYTES NFR BLD AUTO: 16 %
MCH RBC QN AUTO: 31.2 PG (ref 26.6–33)
MCHC RBC AUTO-ENTMCNC: 34.5 G/DL (ref 31.5–35.7)
MCV RBC AUTO: 90 FL (ref 79–97)
MONOCYTES # BLD AUTO: 1 X10E3/UL (ref 0.1–0.9)
MONOCYTES NFR BLD AUTO: 9 %
NEUTROPHILS # BLD AUTO: 7.6 X10E3/UL (ref 1.4–7)
NEUTROPHILS NFR BLD AUTO: 72 %
PLATELET # BLD AUTO: 215 X10E3/UL (ref 150–450)
POTASSIUM SERPL-SCNC: 4.3 MMOL/L (ref 3.5–5.2)
PROT SERPL-MCNC: 6.6 G/DL (ref 6–8.5)
RBC # BLD AUTO: 4.75 X10E6/UL (ref 4.14–5.8)
SODIUM SERPL-SCNC: 141 MMOL/L (ref 134–144)
WBC # BLD AUTO: 10.5 X10E3/UL (ref 3.4–10.8)

## 2023-08-14 ENCOUNTER — HOSPITAL ENCOUNTER (OUTPATIENT)
Dept: RADIOLOGY | Facility: HOSPITAL | Age: 58
Discharge: HOME | End: 2023-08-14
Payer: COMMERCIAL

## 2023-08-14 DIAGNOSIS — R10.13 EPIGASTRIC PAIN: ICD-10-CM

## 2023-08-14 DIAGNOSIS — R74.8 ELEVATED LIPASE: ICD-10-CM

## 2023-08-14 PROCEDURE — 76705 ECHO EXAM OF ABDOMEN: CPT

## 2023-10-30 DIAGNOSIS — E11.9 TYPE 2 DIABETES MELLITUS WITHOUT COMPLICATION, WITHOUT LONG-TERM CURRENT USE OF INSULIN (HCC): ICD-10-CM

## 2023-10-30 DIAGNOSIS — E78.2 MIXED HYPERLIPIDEMIA: ICD-10-CM

## 2023-10-30 DIAGNOSIS — Z12.5 SCREENING PSA (PROSTATE SPECIFIC ANTIGEN): ICD-10-CM

## 2023-10-30 DIAGNOSIS — I10 BENIGN HYPERTENSION: Primary | ICD-10-CM

## 2023-10-30 DIAGNOSIS — K85.90 ACUTE PANCREATITIS, UNSPECIFIED COMPLICATION STATUS, UNSPECIFIED PANCREATITIS TYPE: ICD-10-CM

## 2023-11-01 DIAGNOSIS — E11.9 TYPE 2 DIABETES MELLITUS WITHOUT COMPLICATION, WITHOUT LONG-TERM CURRENT USE OF INSULIN (HCC): Primary | ICD-10-CM

## 2023-11-09 ENCOUNTER — RA CDI HCC (OUTPATIENT)
Dept: OTHER | Facility: HOSPITAL | Age: 58
End: 2023-11-09

## 2023-11-14 LAB
ALBUMIN SERPL-MCNC: 4.3 G/DL (ref 3.6–5.1)
ALBUMIN/GLOB SERPL: 1.8 (CALC) (ref 1–2.5)
ALP SERPL-CCNC: 54 U/L (ref 35–144)
ALT SERPL-CCNC: 26 U/L (ref 9–46)
AST SERPL-CCNC: 19 U/L (ref 10–35)
BASOPHILS # BLD AUTO: 63 CELLS/UL (ref 0–200)
BASOPHILS NFR BLD AUTO: 1 %
BILIRUB SERPL-MCNC: 0.7 MG/DL (ref 0.2–1.2)
BUN SERPL-MCNC: 15 MG/DL (ref 7–25)
BUN/CREAT SERPL: ABNORMAL (CALC) (ref 6–22)
CALCIUM SERPL-MCNC: 9.7 MG/DL (ref 8.6–10.3)
CHLORIDE SERPL-SCNC: 106 MMOL/L (ref 98–110)
CHOLEST SERPL-MCNC: 157 MG/DL
CHOLEST/HDLC SERPL: 2.8 (CALC)
CO2 SERPL-SCNC: 26 MMOL/L (ref 20–32)
CREAT SERPL-MCNC: 0.89 MG/DL (ref 0.7–1.3)
EOSINOPHIL # BLD AUTO: 221 CELLS/UL (ref 15–500)
EOSINOPHIL NFR BLD AUTO: 3.5 %
ERYTHROCYTE [DISTWIDTH] IN BLOOD BY AUTOMATED COUNT: 13.4 % (ref 11–15)
GFR/BSA.PRED SERPLBLD CYS-BASED-ARV: 100 ML/MIN/1.73M2
GLOBULIN SER CALC-MCNC: 2.4 G/DL (CALC) (ref 1.9–3.7)
GLUCOSE SERPL-MCNC: 137 MG/DL (ref 65–99)
HBA1C MFR BLD: 6.3 % OF TOTAL HGB
HCT VFR BLD AUTO: 46.6 % (ref 38.5–50)
HDLC SERPL-MCNC: 56 MG/DL
HGB BLD-MCNC: 16 G/DL (ref 13.2–17.1)
LDLC SERPL CALC-MCNC: 76 MG/DL (CALC)
LIPASE SERPL-CCNC: 17 U/L (ref 7–60)
LYMPHOCYTES # BLD AUTO: 1777 CELLS/UL (ref 850–3900)
LYMPHOCYTES NFR BLD AUTO: 28.2 %
MCH RBC QN AUTO: 31 PG (ref 27–33)
MCHC RBC AUTO-ENTMCNC: 34.3 G/DL (ref 32–36)
MCV RBC AUTO: 90.3 FL (ref 80–100)
MONOCYTES # BLD AUTO: 605 CELLS/UL (ref 200–950)
MONOCYTES NFR BLD AUTO: 9.6 %
NEUTROPHILS # BLD AUTO: 3635 CELLS/UL (ref 1500–7800)
NEUTROPHILS NFR BLD AUTO: 57.7 %
NONHDLC SERPL-MCNC: 101 MG/DL (CALC)
PLATELET # BLD AUTO: 195 THOUSAND/UL (ref 140–400)
PMV BLD REES-ECKER: 12 FL (ref 7.5–12.5)
POTASSIUM SERPL-SCNC: 4.7 MMOL/L (ref 3.5–5.3)
PROT SERPL-MCNC: 6.7 G/DL (ref 6.1–8.1)
PSA SERPL-MCNC: 0.97 NG/ML
RBC # BLD AUTO: 5.16 MILLION/UL (ref 4.2–5.8)
SODIUM SERPL-SCNC: 139 MMOL/L (ref 135–146)
TRIGL SERPL-MCNC: 155 MG/DL
WBC # BLD AUTO: 6.3 THOUSAND/UL (ref 3.8–10.8)

## 2023-11-14 PROCEDURE — 3044F HG A1C LEVEL LT 7.0%: CPT | Performed by: FAMILY MEDICINE

## 2023-11-16 ENCOUNTER — OFFICE VISIT (OUTPATIENT)
Dept: FAMILY MEDICINE CLINIC | Facility: CLINIC | Age: 58
End: 2023-11-16
Payer: COMMERCIAL

## 2023-11-16 ENCOUNTER — TELEPHONE (OUTPATIENT)
Dept: FAMILY MEDICINE CLINIC | Facility: CLINIC | Age: 58
End: 2023-11-16

## 2023-11-16 VITALS
SYSTOLIC BLOOD PRESSURE: 140 MMHG | WEIGHT: 219 LBS | HEART RATE: 72 BPM | DIASTOLIC BLOOD PRESSURE: 80 MMHG | TEMPERATURE: 98 F | HEIGHT: 69 IN | RESPIRATION RATE: 14 BRPM | OXYGEN SATURATION: 98 % | BODY MASS INDEX: 32.44 KG/M2

## 2023-11-16 DIAGNOSIS — I10 BENIGN HYPERTENSION: ICD-10-CM

## 2023-11-16 DIAGNOSIS — E78.2 MIXED HYPERLIPIDEMIA: ICD-10-CM

## 2023-11-16 DIAGNOSIS — Z00.00 PHYSICAL EXAM: Primary | ICD-10-CM

## 2023-11-16 DIAGNOSIS — R73.09 ABNORMAL GLUCOSE: ICD-10-CM

## 2023-11-16 DIAGNOSIS — Z23 ENCOUNTER FOR IMMUNIZATION: ICD-10-CM

## 2023-11-16 PROBLEM — E11.9 DIABETES MELLITUS, TYPE 2 (HCC): Status: RESOLVED | Noted: 2022-05-11 | Resolved: 2023-11-16

## 2023-11-16 PROCEDURE — 90471 IMMUNIZATION ADMIN: CPT | Performed by: FAMILY MEDICINE

## 2023-11-16 PROCEDURE — 90686 IIV4 VACC NO PRSV 0.5 ML IM: CPT | Performed by: FAMILY MEDICINE

## 2023-11-16 PROCEDURE — 99396 PREV VISIT EST AGE 40-64: CPT | Performed by: FAMILY MEDICINE

## 2023-11-16 NOTE — PROGRESS NOTES
[facility] PRACTICE    NAME: Emiliano Goldsmith  AGE: 62 y.o. SEX: male  : 1965     DATE: 2023     Assessment and Plan:     Problem List Items Addressed This Visit        Cardiovascular and Mediastinum    Benign hypertension    Relevant Orders    Comprehensive metabolic panel       Other    Hyperlipidemia    Relevant Orders    Lipid panel   Other Visit Diagnoses     Physical exam    -  Primary    Encounter for immunization        Relevant Orders    FLUZONE: influenza vaccine, quadrivalent, 0.5 mL (Completed)    Abnormal glucose        Relevant Orders    Hemoglobin A1C With EAG      Currently the patient remains prediabetic status. Did advise the patient that he is very close to the border of diabetes like we discussed in the past  Recommend every 6-month an A1c  If the patient's A1c climbs I recommend starting a possibly an injectable  Recommend at least a 10 pound weight goal  If within the next 6 months he starts having diabetic symptoms highly recommend coming into our office for repeat evaluation sooner  Currently continue on simvastatin 20 mg slight elevation in triglycerides  Lipase was normal  Hypertension slightly above goal however just recently took his medications this morning      Depression Screening and Follow-up Plan: Patient was screened for depression during today's encounter. They screened negative with a PHQ-2 score of 0. Return in 6 months    No follow-ups on file. Chief Complaint:     Chief Complaint   Patient presents with   • Physical Exam      History of Present Illness:     Adult Annual Physical   Patient here for a comprehensive physical exam.   Patient states that he has been doing very well has been trying to walk to lower his A1c. Patient states that he has been very compliant with his diet as well.   Patient states that he is taking all his medications as prescribed with no side effects at this time.  Has not had any kind of pancreatitis symptoms  Diet and Physical Activity  Diet/Nutrition: well balanced diet. Exercise: walking. Depression Screening  PHQ-2/9 Depression Screening    Little interest or pleasure in doing things: 0 - not at all  Feeling down, depressed, or hopeless: 0 - not at all  PHQ-2 Score: 0  PHQ-2 Interpretation: Negative depression screen       General Health  Sleep: gets 7-8 hours of sleep on average. Hearing: normal - bilateral.  Vision: no vision problems. Dental: regular dental visits.  Health  Symptoms include: none       Review of Systems:     Review of Systems   Constitutional:  Negative for activity change, appetite change, chills, fatigue and fever. HENT:  Negative for congestion. Respiratory:  Negative for cough, chest tightness and shortness of breath. Cardiovascular:  Negative for chest pain and leg swelling. Gastrointestinal:  Negative for abdominal distention, abdominal pain, constipation, diarrhea, nausea and vomiting. All other systems reviewed and are negative. Past Medical History:     Past Medical History:   Diagnosis Date   • Arthritis    • Diabetes mellitus ([facility])     type 2 diet controlled   • Diabetes mellitus, type 2 ([address]) 05/11/2022   • Gout    • Hypercholesteremia    • Hypertension    • Shingles       Past Surgical History:     Past Surgical History:   Procedure Laterality Date   • APPENDECTOMY     • ARTHROPLASTY HIP TOTAL ANTERIOR Right 07/26/2022    Procedure: ARTHROPLASTY HIP TOTAL ANTERIOR, NAVIGATED;  Surgeon: Quique Abel DO;  Location: [facility];  Service: Orthopedics   • COLONOSCOPY N/A 08/31/2016    Procedure: COLONOSCOPY;  Surgeon: Mo Severino MD;  Location: [address] [place] [address] GI LAB;   Service:    • FL INJECTION RIGHT HIP (NON ARTHROGRAM)  03/10/2022   • JOINT REPLACEMENT  Right Hip   • OH ARTHROCENTESIS ASPIR&/INJ MAJOR JT/BURSA W/O US Right 03/10/2022    Procedure: Hip intra-articular cortisone injection ( [id number] ); Surgeon: Chencho Gann MD;  Location: [facility] MAIN OR;  Service: Pain Management       Family History:     Family History   Problem Relation Age of Onset   • Diabetes Mother            • Diabetes Father            • Cancer Father    • Diabetes Sister    • Colon cancer Family       Social History:     Social History     Socioeconomic History   • Marital status: /Civil Union     Spouse name: None   • Number of children: None   • Years of education: None   • Highest education level: None   Occupational History   • None   Tobacco Use   • Smoking status: Former     Packs/day: 2.00     Years: 15.00     Total pack years: 30.00     Types: Cigarettes     Start date: 1980     Quit date: 2008     Years since quitting: 15.2   • Smokeless tobacco: Never   • Tobacco comments:     quit 10 years ago   Vaping Use   • Vaping Use: Never used   Substance and Sexual Activity   • Alcohol use:  Yes     Alcohol/week: 15.0 standard drinks of alcohol     Types: 5 Cans of beer, 10 Standard drinks or equivalent per week     Comment: beer two to three times per week /occasional    • Drug use: No   • Sexual activity: Yes     Partners: Female     Birth control/protection: Male Sterilization   Other Topics Concern   • None   Social History Narrative    Caffeine use      Social Determinants of Health     Financial Resource Strain: Not on file   Food Insecurity: Not on file   Transportation Needs: Not on file   Physical Activity: Not on file   Stress: Not on file   Social Connections: Not on file   Intimate Partner Violence: Not on file   Housing Stability: Not on file      Current Medications:     Current Outpatient Medications   Medication Sig Dispense Refill   • allopurinol (ZYLOPRIM) 300 mg tablet Take 1 tablet (300 mg total) by mouth daily 90 tablet 3   • lisinopril (ZESTRIL) 2.5 mg tablet Take 1 tablet (2.5 mg total) by mouth daily 90 tablet 3   • simvastatin (ZOCOR) 20 mg tablet Take 1 tablet (20 mg total) by mouth daily 90 tablet 3   • pantoprazole (PROTONIX) 40 mg tablet Take 1 tablet (40 mg total) by mouth daily 30 tablet 0     No current facility-administered medications for this visit. Allergies:     No Known Allergies   Physical Exam:     /80 (BP Location: Left arm, Patient Position: Sitting, Cuff Size: Adult)   Pulse 72   Temp 98 °F (36.7 °C) (Temporal)   Resp 14   Ht 5' 9" (1.753 m)   Wt 99.3 kg (219 lb)   SpO2 98%   BMI 32.34 kg/m²     Physical Exam  Vitals reviewed. Constitutional:       Appearance: He is well-developed. HENT:      Head: Normocephalic and atraumatic. Right Ear: Tympanic membrane, ear canal and external ear normal.      Left Ear: Tympanic membrane, ear canal and external ear normal.      Nose: Nose normal.      Mouth/Throat:      Mouth: Mucous membranes are moist.   Eyes:      Conjunctiva/sclera: Conjunctivae normal.      Pupils: Pupils are equal, round, and reactive to light. Cardiovascular:      Rate and Rhythm: Normal rate and regular rhythm. Heart sounds: Normal heart sounds. Pulmonary:      Effort: Pulmonary effort is normal.      Breath sounds: Normal breath sounds. No wheezing. Abdominal:      General: Bowel sounds are normal. There is no distension. Palpations: Abdomen is soft. There is no mass. Tenderness: There is no abdominal tenderness. Musculoskeletal:         General: No tenderness. Normal range of motion. Cervical back: Normal range of motion and neck supple. Skin:     General: Skin is warm. Capillary Refill: Capillary refill takes less than 2 seconds. Neurological:      Mental Status: He is alert and oriented to person, place, and time. Cranial Nerves: No cranial nerve deficit. Sensory: No sensory deficit. Motor: No abnormal muscle tone. Coordination: Coordination normal.      Deep Tendon Reflexes: Reflexes normal.   Psychiatric:         Behavior: Behavior normal.         Thought Content:  Thought content normal.         Judgment: Judgment normal.          April Amador MD  [facility]

## 2023-11-16 NOTE — TELEPHONE ENCOUNTER
----- Message from Brooklyn Rosado MD sent at 11/16/2023 10:14 AM EST -----  Left without making his follow-up for 6-month diabetes in April.   Please call and schedule

## 2024-01-18 ENCOUNTER — OFFICE VISIT (OUTPATIENT)
Dept: DERMATOLOGY | Facility: CLINIC | Age: 59
End: 2024-01-18
Payer: COMMERCIAL

## 2024-01-18 DIAGNOSIS — I78.1 TELANGIECTASIA: Primary | ICD-10-CM

## 2024-01-18 PROCEDURE — 99212 OFFICE O/P EST SF 10 MIN: CPT | Performed by: DERMATOLOGY

## 2024-01-18 RX ORDER — AMOXICILLIN 500 MG/1
500 CAPSULE ORAL AS NEEDED
COMMUNITY

## 2024-01-18 NOTE — PROGRESS NOTES
"[facility] Dermatology Clinic Note     Patient Name: Josse Castro  Encounter Date: 1/18/24     Have you been cared for by a [facility] Dermatologist in the last 3 years and, if so, which description applies to you?    Yes.  I have been here within the last 3 years, and my medical history has NOT changed since that time.  I am MALE/not capable of bearing children.    REVIEW OF SYSTEMS:  Have you recently had or currently have any of the following? No changes in my recent health.   PAST MEDICAL HISTORY:  Have you personally ever had or currently have any of the following?  If \"YES,\" then please provide more detail. No changes in my medical history.   HISTORY OF IMMUNOSUPPRESSION: Do you have a history of any of the following:  Systemic Immunosuppression such as Diabetes, Biologic or Immunotherapy, Chemotherapy, Organ Transplantation, Bone Marrow Transplantation?  No     Answering \"YES\" requires the addition of the dotphrase \"IMMUNOSUPPRESSED\" as the first diagnosis of the patient's visit.   FAMILY HISTORY:  Any \"first degree relatives\" (parent, brother, sister, or child) with the following?    No changes in my family's known health.   PATIENT EXPERIENCE:    Do you want the Dermatologist to perform a COMPLETE skin exam today including a clinical examination under the \"bra and underwear\" areas?  NO  If necessary, do we have your permission to call and leave a detailed message on your Preferred Phone number that includes your specific medical information?  Yes      No Known Allergies   Current Outpatient Medications:     allopurinol (ZYLOPRIM) 300 mg tablet, Take 1 tablet (300 mg total) by mouth daily, Disp: 90 tablet, Rfl: 3    lisinopril (ZESTRIL) 2.5 mg tablet, Take 1 tablet (2.5 mg total) by mouth daily, Disp: 90 tablet, Rfl: 3    pantoprazole (PROTONIX) 40 mg tablet, Take 1 tablet (40 mg total) by mouth daily, Disp: 30 tablet, Rfl: 0    simvastatin (ZOCOR) 20 mg tablet, Take 1 tablet (20 mg total) by mouth daily, " Disp: 90 tablet, Rfl: 3          Whom besides the patient is providing clinical information about today's encounter?   NO ADDITIONAL HISTORIAN (patient alone provided history)    Physical Exam and Assessment/Plan by Diagnosis:      TELANGIECTASIS     Physical Exam:  Anatomic Location Affected:  nasal tip  Morphological Description:  dilated blood vessels     Additional History of Present Condition:  Patient states there is a lesion on the nose that comes and goes - today is resolved.  Has a photo.  Father had a melanoma.     Assessment and Plan:  Based on a thorough discussion of this condition and the management approach to it (including a comprehensive discussion of the known risks, side effects and potential benefits of treatment), the patient (family) agrees to implement the following specific plan:    Only telangiectasias on the nose today; he has them on the cheek too. I reviewed the picture on his phone - unable to diagnose the lesion from the picture. Told him to follow up if it recurs   Monitor at next skin check

## 2024-04-04 LAB
ALBUMIN SERPL-MCNC: 4.3 G/DL (ref 3.6–5.1)
ALBUMIN/GLOB SERPL: 1.9 (CALC) (ref 1–2.5)
ALP SERPL-CCNC: 54 U/L (ref 35–144)
ALT SERPL-CCNC: 34 U/L (ref 9–46)
AST SERPL-CCNC: 22 U/L (ref 10–35)
BILIRUB SERPL-MCNC: 0.8 MG/DL (ref 0.2–1.2)
BUN SERPL-MCNC: 15 MG/DL (ref 7–25)
BUN/CREAT SERPL: ABNORMAL (CALC) (ref 6–22)
CALCIUM SERPL-MCNC: 9.5 MG/DL (ref 8.6–10.3)
CHLORIDE SERPL-SCNC: 105 MMOL/L (ref 98–110)
CHOLEST SERPL-MCNC: 140 MG/DL
CHOLEST/HDLC SERPL: 2.5 (CALC)
CO2 SERPL-SCNC: 26 MMOL/L (ref 20–32)
CREAT SERPL-MCNC: 0.93 MG/DL (ref 0.7–1.3)
GFR/BSA.PRED SERPLBLD CYS-BASED-ARV: 95 ML/MIN/1.73M2
GLOBULIN SER CALC-MCNC: 2.3 G/DL (CALC) (ref 1.9–3.7)
GLUCOSE SERPL-MCNC: 132 MG/DL (ref 65–99)
HBA1C MFR BLD: 6.1 % OF TOTAL HGB
HDLC SERPL-MCNC: 56 MG/DL
LDLC SERPL CALC-MCNC: 64 MG/DL (CALC)
NONHDLC SERPL-MCNC: 84 MG/DL (CALC)
POTASSIUM SERPL-SCNC: 4.4 MMOL/L (ref 3.5–5.3)
PROT SERPL-MCNC: 6.6 G/DL (ref 6.1–8.1)
SODIUM SERPL-SCNC: 139 MMOL/L (ref 135–146)
TRIGL SERPL-MCNC: 114 MG/DL

## 2024-04-16 ENCOUNTER — OFFICE VISIT (OUTPATIENT)
Dept: FAMILY MEDICINE CLINIC | Facility: CLINIC | Age: 59
End: 2024-04-16
Payer: COMMERCIAL

## 2024-04-16 VITALS
BODY MASS INDEX: 32.26 KG/M2 | HEIGHT: 69 IN | RESPIRATION RATE: 18 BRPM | TEMPERATURE: 98 F | HEART RATE: 80 BPM | WEIGHT: 217.8 LBS | DIASTOLIC BLOOD PRESSURE: 84 MMHG | OXYGEN SATURATION: 98 % | SYSTOLIC BLOOD PRESSURE: 140 MMHG

## 2024-04-16 DIAGNOSIS — R73.09 ABNORMAL GLUCOSE: ICD-10-CM

## 2024-04-16 DIAGNOSIS — E78.2 MIXED HYPERLIPIDEMIA: ICD-10-CM

## 2024-04-16 DIAGNOSIS — I10 BENIGN HYPERTENSION: ICD-10-CM

## 2024-04-16 DIAGNOSIS — Z00.00 PE (PHYSICAL EXAM), ANNUAL: Primary | ICD-10-CM

## 2024-04-16 PROCEDURE — 99173 VISUAL ACUITY SCREEN: CPT | Performed by: FAMILY MEDICINE

## 2024-04-16 PROCEDURE — 99396 PREV VISIT EST AGE 40-64: CPT | Performed by: FAMILY MEDICINE

## 2024-04-16 RX ORDER — AMOXICILLIN 500 MG/1
500 TABLET, FILM COATED ORAL
COMMUNITY
Start: 2024-02-15

## 2024-04-16 NOTE — PROGRESS NOTES
ADULT ANNUAL PHYSICAL  [facility]    NAME: Josse Castro  AGE: 58 y.o. SEX: male  : 1965     DATE: 2024     Assessment and Plan:     Problem List Items Addressed This Visit        Cardiovascular and Mediastinum    Benign hypertension       Other    Hyperlipidemia   Other Visit Diagnoses     PE (physical exam), annual [Z00.00]    -  Primary    Abnormal glucose        Relevant Orders    Hemoglobin A1C With EAG      Very proud of this patient his A1c is significantly improved.  Patient's lisinopril is to remain at 2.5 mg.  Simvastatin to continue at 20 mg.  Gout free for multiple years continue on allopurinol.  At this time did discuss about possibly a calcium score test patient has declined at this time         No follow-ups on file.     Chief Complaint:     Chief Complaint   Patient presents with   • Physical Exam      History of Present Illness:     Adult Annual Physical   Patient here for a comprehensive physical exam.   Patient has done tremendously trying to keep his A1c down.  States that he has been walking daily.  Patient is taking all his blood pressure medications as prescribed without any side effects.  Taking his cholesterol medications without any side effects.  Patient was very excited for this appointment given that he has been making multiple lifestyle changes.  Has reduced his alcohol intake as well  Diet and Physical Activity  Diet/Nutrition: well balanced diet.   Exercise: walking.      Depression Screening  PHQ-2/9 Depression Screening    Little interest or pleasure in doing things: 0 - not at all  Feeling down, depressed, or hopeless: 0 - not at all  PHQ-2 Score: 0  PHQ-2 Interpretation: Negative depression screen       General Health  Sleep: sleeps well.   Hearing: normal - bilateral.  Vision: no vision problems.   Dental: regular dental visits.        Health  Symptoms include: none       Review of Systems:     Review of  Systems   Constitutional:  Negative for activity change, appetite change, chills, fatigue and fever.   HENT:  Negative for congestion.    Respiratory:  Negative for cough, chest tightness and shortness of breath.    Cardiovascular:  Negative for chest pain and leg swelling.   Gastrointestinal:  Negative for abdominal distention, abdominal pain, constipation, diarrhea, nausea and vomiting.   All other systems reviewed and are negative.     Past Medical History:     Past Medical History:   Diagnosis Date   • Arthritis    • Diabetes mellitus (HCC)     type 2 diet controlled   • Diabetes mellitus, type 2 (HCC) 2022   • Gout    • Hypercholesteremia    • Hypertension    • Shingles       Past Surgical History:     Past Surgical History:   Procedure Laterality Date   • APPENDECTOMY     • ARTHROPLASTY HIP TOTAL ANTERIOR Right 2022    Procedure: ARTHROPLASTY HIP TOTAL ANTERIOR, NAVIGATED;  Surgeon: Ivan Castro DO;  Location: [facility] MAIN OR;  Service: Orthopedics   • COLONOSCOPY N/A 2016    Procedure: COLONOSCOPY;  Surgeon: Juan Antonio Clemens MD;  Location: [facility] GI LAB;  Service:    • FL INJECTION RIGHT HIP (NON ARTHROGRAM)  03/10/2022   • JOINT REPLACEMENT  Right Hip   • OH ARTHROCENTESIS ASPIR&/INJ MAJOR JT/BURSA W/O US Right 03/10/2022    Procedure: Hip intra-articular cortisone injection ( [id number] );  Surgeon: Juan Pablo Muniz MD;  Location: [facility] MAIN OR;  Service: Pain Management       Family History:     Family History   Problem Relation Age of Onset   • Diabetes Mother            • Diabetes Father            • Cancer Father    • Diabetes Sister    • Colon cancer Family       Social History:     Social History     Socioeconomic History   • Marital status: /Civil Union     Spouse name: None   • Number of children: None   • Years of education: None   • Highest education level: None   Occupational History   • None   Tobacco Use   • Smoking status: Former     Current packs/day: 0.00     " Average packs/day: 2.0 packs/day for 28.7 years (57.3 ttl pk-yrs)     Types: Cigarettes     Start date: 1/1/1980     Quit date: 9/1/2008     Years since quitting: 15.6   • Smokeless tobacco: Never   • Tobacco comments:     quit 10 years ago   Vaping Use   • Vaping status: Never Used   Substance and Sexual Activity   • Alcohol use: Yes     Alcohol/week: 15.0 standard drinks of alcohol     Types: 5 Cans of beer, 10 Standard drinks or equivalent per week     Comment: beer two to three times per week /occasional    • Drug use: No   • Sexual activity: Yes     Partners: Female     Birth control/protection: Male Sterilization   Other Topics Concern   • None   Social History Narrative    Caffeine use      Social Determinants of Health     Financial Resource Strain: Not on file   Food Insecurity: Not on file   Transportation Needs: Not on file   Physical Activity: Not on file   Stress: Not on file   Social Connections: Not on file   Intimate Partner Violence: Not on file   Housing Stability: Not on file      Current Medications:     Current Outpatient Medications   Medication Sig Dispense Refill   • allopurinol (ZYLOPRIM) 300 mg tablet Take 1 tablet (300 mg total) by mouth daily 90 tablet 3   • lisinopril (ZESTRIL) 2.5 mg tablet Take 1 tablet (2.5 mg total) by mouth daily 90 tablet 3   • simvastatin (ZOCOR) 20 mg tablet Take 1 tablet (20 mg total) by mouth daily 90 tablet 3   • amoxicillin (AMOXIL) 500 MG tablet Take 500 mg by mouth (Patient not taking: Reported on 4/16/2024)       No current facility-administered medications for this visit.      Allergies:     No Known Allergies   Physical Exam:     /84 (BP Location: Left arm, Patient Position: Sitting, Cuff Size: Large)   Pulse 80   Temp 98 °F (36.7 °C) (Temporal)   Resp 18   Ht 5' 9\" (1.753 m)   Wt 98.8 kg (217 lb 12.8 oz)   SpO2 98%   BMI 32.16 kg/m²     Physical Exam  Vitals reviewed.   Constitutional:       Appearance: He is well-developed.   HENT:      " Head: Normocephalic and atraumatic.      Right Ear: Tympanic membrane, ear canal and external ear normal.      Left Ear: Tympanic membrane, ear canal and external ear normal.      Nose: Nose normal.      Mouth/Throat:      Mouth: Mucous membranes are moist.   Eyes:      Conjunctiva/sclera: Conjunctivae normal.      Pupils: Pupils are equal, round, and reactive to light.   Cardiovascular:      Rate and Rhythm: Normal rate and regular rhythm.      Heart sounds: Normal heart sounds.   Pulmonary:      Effort: Pulmonary effort is normal.      Breath sounds: Normal breath sounds. No wheezing.   Abdominal:      General: Bowel sounds are normal. There is no distension.      Palpations: Abdomen is soft. There is no mass.      Tenderness: There is no abdominal tenderness.   Musculoskeletal:         General: No tenderness. Normal range of motion.      Cervical back: Normal range of motion and neck supple.   Skin:     General: Skin is warm.      Capillary Refill: Capillary refill takes less than 2 seconds.   Neurological:      Mental Status: He is alert and oriented to person, place, and time.      Cranial Nerves: No cranial nerve deficit.      Sensory: No sensory deficit.      Motor: No abnormal muscle tone.      Coordination: Coordination normal.      Deep Tendon Reflexes: Reflexes normal.   Psychiatric:         Behavior: Behavior normal.         Thought Content: Thought content normal.         Judgment: Judgment normal.          Meggan Gtz MD  [facility]

## 2024-04-22 DIAGNOSIS — I10 BENIGN HYPERTENSION: ICD-10-CM

## 2024-04-22 DIAGNOSIS — M1A.49X0 OTHER SECONDARY CHRONIC GOUT OF MULTIPLE SITES WITHOUT TOPHUS: ICD-10-CM

## 2024-04-22 DIAGNOSIS — E78.2 MIXED HYPERLIPIDEMIA: ICD-10-CM

## 2024-04-22 RX ORDER — LISINOPRIL 2.5 MG/1
2.5 TABLET ORAL DAILY
Qty: 90 TABLET | Refills: 1 | Status: SHIPPED | OUTPATIENT
Start: 2024-04-22

## 2024-04-22 RX ORDER — SIMVASTATIN 20 MG
20 TABLET ORAL DAILY
Qty: 90 TABLET | Refills: 1 | Status: SHIPPED | OUTPATIENT
Start: 2024-04-22

## 2024-04-22 RX ORDER — ALLOPURINOL 300 MG/1
300 TABLET ORAL DAILY
Qty: 90 TABLET | Refills: 1 | Status: SHIPPED | OUTPATIENT
Start: 2024-04-22

## 2024-05-14 ENCOUNTER — OFFICE VISIT (OUTPATIENT)
Age: 59
End: 2024-05-14
Payer: COMMERCIAL

## 2024-05-14 VITALS — TEMPERATURE: 97.6 F | HEIGHT: 69 IN | BODY MASS INDEX: 32.12 KG/M2 | WEIGHT: 216.9 LBS

## 2024-05-14 DIAGNOSIS — D18.01 CHERRY ANGIOMA: Primary | ICD-10-CM

## 2024-05-14 DIAGNOSIS — D22.9 ATYPICAL NEVI: ICD-10-CM

## 2024-05-14 DIAGNOSIS — L81.4 LENTIGINES: ICD-10-CM

## 2024-05-14 PROCEDURE — 99213 OFFICE O/P EST LOW 20 MIN: CPT | Performed by: DERMATOLOGY

## 2024-05-14 NOTE — PATIENT INSTRUCTIONS
"  [facility]GIOM[facility]     Physical Exam:  Anatomic Location Affected:  Trunk and extremities  Morphological Description:  Scattered cherry red papules  Denies pain, itch, bleeding. No treatments tried. Present for years. Present constantly; no modifying factors which make it worse or better.     Assessment and Plan:  Based on a thorough discussion of this condition and the management approach to it (including a comprehensive discussion of the known risks, side effects and potential benefits of treatment), the patient (family) agrees to implement the following specific plan:  Reassure benign      SOLAR LENTIGINES   OTHER SKIN CHANGES DUE TO CHRONIC EXPOSURE TO NONIONIZING RADIATION     Physical Exam:  Anatomic Location Affected:  Sun exposed areas of back, chest, arms, legs  Morphological Description:  Multiple scattered brown to tan evenly pigmented macules   Denies pain, itch, bleeding. No treatments tried. Present for months - years. Reports getting newer lesions with sun exposure.         Assessment and Plan:  Based on a thorough discussion of this condition and the management approach to it (including a comprehensive discussion of the known risks, side effects and potential benefits of treatment), the patient (family) agrees to implement the following specific plan:  Reassure benign  Use sun protection.  Apply SPF 30 or higher at least three times a day.  Wear sun protecting clothing and hats.         MULTIPLE MELANOCYTIC NEVI (\"Moles\")     Physical Exam:  Anatomic Location Affected: Trunk and extremities  Morphological Description:  Scattered, round to ovoid, symmetrical-appearing, even bordered, skin colored to dark brown macules/papules  Denies pain, itch, bleeding. No treatments tried. Present for years. Present constantly; no modifying factors which make it worse or better. Denies actively changing or growing moles.      Assessment and Plan:  Based on a thorough discussion of this condition and the management " approach to it (including a comprehensive discussion of the known risks, side effects and potential benefits of treatment), the patient (family) agrees to implement the following specific plan:  Reassure benign  Monitor for changes  Use sun protection.  Apply SPF 30 or higher at least three times a day.  Wear sun protecting clothing and hats.  Follow up in 2 years        Worrisome signs of skin malignancy discussed, questions answered. Regular self-skin check discussed. Advised to call or return to office if patient notices any spots of concern, rapidly growing/changing lesions, bleeding lesions, non-healing lesions. Advised regular SPF use.

## 2024-05-14 NOTE — PROGRESS NOTES
"[facility] Dermatology Clinic Note     Patient Name: Josse Castro  Encounter Date: 5/14/2024     Have you been cared for by a [facility] Dermatologist in the last 3 years and, if so, which description applies to you?    Yes.  I have been here within the last 3 years, and my medical history has NOT changed since that time.  I am MALE/not capable of bearing children.    REVIEW OF SYSTEMS:  Have you recently had or currently have any of the following? No changes in my recent health.   PAST MEDICAL HISTORY:  Have you personally ever had or currently have any of the following?  If \"YES,\" then please provide more detail. No changes in my medical history.   HISTORY OF IMMUNOSUPPRESSION: Do you have a history of any of the following:  Systemic Immunosuppression such as Diabetes, Biologic or Immunotherapy, Chemotherapy, Organ Transplantation, Bone Marrow Transplantation?  No     Answering \"YES\" requires the addition of the dotphrase \"IMMUNOSUPPRESSED\" as the first diagnosis of the patient's visit.   FAMILY HISTORY:  Any \"first degree relatives\" (parent, brother, sister, or child) with the following?    No changes in my family's known health.   PATIENT EXPERIENCE:    Do you want the Dermatologist to perform a COMPLETE skin exam today including a clinical examination under the \"bra and underwear\" areas?  Yes  If necessary, do we have your permission to call and leave a detailed message on your Preferred Phone number that includes your specific medical information?  Yes      No Known Allergies   Current Outpatient Medications:   •  allopurinol (ZYLOPRIM) 300 mg tablet, Take 1 tablet (300 mg total) by mouth daily, Disp: 90 tablet, Rfl: 1  •  lisinopril (ZESTRIL) 2.5 mg tablet, Take 1 tablet (2.5 mg total) by mouth daily, Disp: 90 tablet, Rfl: 1  •  simvastatin (ZOCOR) 20 mg tablet, Take 1 tablet (20 mg total) by mouth daily, Disp: 90 tablet, Rfl: 1  •  amoxicillin (AMOXIL) 500 MG tablet, Take 500 mg by mouth (Patient not " taking: Reported on 4/16/2024), Disp: , Rfl:           Whom besides the patient is providing clinical information about today's encounter?   NO ADDITIONAL HISTORIAN (patient alone provided history)    Physical Exam and Assessment/Plan by Diagnosis:    Full Body Skin Check      Additional History of Present Condition:  Patient presents today for full body skin check. Patients father has a history of melanoma. Patient bonilla not have any spot of concerns today     Assessment and Plan:  Based on a thorough discussion of this condition and the management approach to it (including a comprehensive discussion of the known risks, side effects and potential benefits of treatment), the patient (family) agrees to implement the following specific plan:  Follow up in 2 years       GTZ ANGIOMAS     Physical Exam:  Anatomic Location Affected:  Trunk and extremities  Morphological Description:  Scattered cherry red papules  Denies pain, itch, bleeding. No treatments tried. Present for years. Present constantly; no modifying factors which make it worse or better.     Assessment and Plan:  Based on a thorough discussion of this condition and the management approach to it (including a comprehensive discussion of the known risks, side effects and potential benefits of treatment), the patient (family) agrees to implement the following specific plan:  Reassure benign      SOLAR LENTIGINES   OTHER SKIN CHANGES DUE TO CHRONIC EXPOSURE TO NONIONIZING RADIATION     Physical Exam:  Anatomic Location Affected:  Sun exposed areas of back, chest, arms, legs  Morphological Description:  Multiple scattered brown to tan evenly pigmented macules   Denies pain, itch, bleeding. No treatments tried. Present for months - years. Reports getting newer lesions with sun exposure.         Assessment and Plan:  Based on a thorough discussion of this condition and the management approach to it (including a comprehensive discussion of the known risks, side  "effects and potential benefits of treatment), the patient (family) agrees to implement the following specific plan:  Reassure benign  Use sun protection.  Apply SPF 30 or higher at least three times a day.  Wear sun protecting clothing and hats.         MULTIPLE MELANOCYTIC NEVI (\"Moles\")     Physical Exam:  Anatomic Location Affected: Trunk and extremities  Morphological Description:  Scattered, round to ovoid, symmetrical-appearing, even bordered, skin colored to dark brown macules/papules  Denies pain, itch, bleeding. No treatments tried. Present for years. Present constantly; no modifying factors which make it worse or better. Denies actively changing or growing moles.      Assessment and Plan:  Based on a thorough discussion of this condition and the management approach to it (including a comprehensive discussion of the known risks, side effects and potential benefits of treatment), the patient (family) agrees to implement the following specific plan:  Reassure benign  Monitor for changes  Use sun protection.  Apply SPF 30 or higher at least three times a day.  Wear sun protecting clothing and hats.       Worrisome signs of skin malignancy discussed, questions answered. Regular self-skin check discussed. Advised to call or return to office if patient notices any spots of concern, rapidly growing/changing lesions, bleeding lesions, non-healing lesions. Advised regular SPF use.      Scribe Attestation    I,:  Tonya Cortez am acting as a scribe while in the presence of the attending physician.:       I,:  Ana Becerril MD personally performed the services described in this documentation    as scribed in my presence.:            "

## 2024-07-22 DIAGNOSIS — M1A.49X0 OTHER SECONDARY CHRONIC GOUT OF MULTIPLE SITES WITHOUT TOPHUS: ICD-10-CM

## 2024-07-22 RX ORDER — ALLOPURINOL 300 MG/1
300 TABLET ORAL DAILY
Qty: 90 TABLET | Refills: 1 | Status: SHIPPED | OUTPATIENT
Start: 2024-07-22

## 2024-10-23 DIAGNOSIS — I10 BENIGN HYPERTENSION: ICD-10-CM

## 2024-10-23 DIAGNOSIS — M1A.49X0 OTHER SECONDARY CHRONIC GOUT OF MULTIPLE SITES WITHOUT TOPHUS: ICD-10-CM

## 2024-10-23 DIAGNOSIS — E78.2 MIXED HYPERLIPIDEMIA: ICD-10-CM

## 2024-10-23 LAB
EST. AVERAGE GLUCOSE BLD GHB EST-MCNC: 151 MG/DL
EST. AVERAGE GLUCOSE BLD GHB EST-SCNC: 8.4 MMOL/L
HBA1C MFR BLD: 6.9 % OF TOTAL HGB

## 2024-10-24 RX ORDER — ALLOPURINOL 300 MG/1
300 TABLET ORAL DAILY
Qty: 90 TABLET | Refills: 1 | Status: SHIPPED | OUTPATIENT
Start: 2024-10-24

## 2024-10-24 RX ORDER — SIMVASTATIN 20 MG
20 TABLET ORAL DAILY
Qty: 90 TABLET | Refills: 1 | Status: SHIPPED | OUTPATIENT
Start: 2024-10-24

## 2024-10-24 RX ORDER — LISINOPRIL 2.5 MG/1
2.5 TABLET ORAL DAILY
Qty: 90 TABLET | Refills: 1 | Status: SHIPPED | OUTPATIENT
Start: 2024-10-24

## 2024-10-31 ENCOUNTER — OFFICE VISIT (OUTPATIENT)
Dept: FAMILY MEDICINE CLINIC | Facility: CLINIC | Age: 59
End: 2024-10-31
Payer: COMMERCIAL

## 2024-10-31 VITALS
OXYGEN SATURATION: 96 % | HEIGHT: 69 IN | DIASTOLIC BLOOD PRESSURE: 72 MMHG | SYSTOLIC BLOOD PRESSURE: 148 MMHG | HEART RATE: 112 BPM | RESPIRATION RATE: 18 BRPM | TEMPERATURE: 98 F | BODY MASS INDEX: 32.82 KG/M2 | WEIGHT: 221.6 LBS

## 2024-10-31 DIAGNOSIS — Z13.220 SCREENING CHOLESTEROL LEVEL: ICD-10-CM

## 2024-10-31 DIAGNOSIS — E78.2 MIXED HYPERLIPIDEMIA: ICD-10-CM

## 2024-10-31 DIAGNOSIS — R73.09 ABNORMAL GLUCOSE: ICD-10-CM

## 2024-10-31 DIAGNOSIS — I10 BENIGN HYPERTENSION: Primary | ICD-10-CM

## 2024-10-31 PROCEDURE — 99214 OFFICE O/P EST MOD 30 MIN: CPT | Performed by: FAMILY MEDICINE

## 2024-10-31 NOTE — PROGRESS NOTES
Josse Castro 1965 male MRN: [id number]    [facility] PRACTICE OFFICE VISIT  [facility] - [facility]      ASSESSMENT/PLAN  Josse Castro is a 58 y.o. male presents to the office for    Diagnoses and all orders for this visit:    Benign hypertension  -     CBC and differential; Future  -     Comprehensive metabolic panel; Future  -     CBC and differential  -     Comprehensive metabolic panel    Mixed hyperlipidemia    Abnormal glucose  -     metFORMIN (GLUCOPHAGE) 500 mg tablet; Take 1 tablet (500 mg total) by mouth daily with breakfast  -     Hemoglobin A1C; Future  -     Hemoglobin A1C    Screening cholesterol level  -     Lipid panel; Future  -     Lipid panel    Abnormal glucose patient's A1c did flip into a diabetic status.  At this time we will start him on a low-dose metformin with plans on discontinuing in the future.  Recommend seeing the eye physician.  Did advise the patient we will repeat his A1c in over 3 months if it continues to be persistent we will change his status to type I diabetic.  Patient has always been able to keep himself in the prediabetic range with diet and lifestyle modifications.  Blood pressure has been stable.  Currently slightly elevated just given the setting of being nervous during her appointment  Hyperlipidemia stable repeat in 6 months           No future appointments.       SUBJECTIVE  CC: Follow-up      HPI:  Josse Castro is a 58 y.o. male who presents for an follow-up on blood work.  Patient states that his A1c has been stable until recently.  States that he did eat what ever he wanted over the summer but felt like being active likely balanced it until he saw the results.  States he quickly made modifications when he saw the results.  Patient states he is nervous that is why his heart rate and blood pressure slightly elevated today.    Review of Systems   Constitutional:  Negative for activity change, appetite change, chills, fatigue  "and fever.   HENT:  Negative for congestion.    Respiratory:  Negative for cough, chest tightness and shortness of breath.    Cardiovascular:  Negative for chest pain and leg swelling.   Gastrointestinal:  Negative for abdominal distention, abdominal pain, constipation, diarrhea, nausea and vomiting.   All other systems reviewed and are negative.      Historical Information   The patient history was reviewed as follows:  Past Medical History:   Diagnosis Date    Arthritis     Diabetes mellitus (HCC)     type 2 diet controlled    Diabetes mellitus, type 2 (HCC) 05/11/2022    Gout     Hypercholesteremia     Hypertension     Shingles          Medications:     Current Outpatient Medications:     allopurinol (ZYLOPRIM) 300 mg tablet, Take 1 tablet (300 mg total) by mouth daily, Disp: 90 tablet, Rfl: 1    lisinopril (ZESTRIL) 2.5 mg tablet, Take 1 tablet (2.5 mg total) by mouth daily, Disp: 90 tablet, Rfl: 1    metFORMIN (GLUCOPHAGE) 500 mg tablet, Take 1 tablet (500 mg total) by mouth daily with breakfast, Disp: 90 tablet, Rfl: 1    simvastatin (ZOCOR) 20 mg tablet, Take 1 tablet (20 mg total) by mouth daily, Disp: 90 tablet, Rfl: 1    amoxicillin (AMOXIL) 500 MG tablet, Take 500 mg by mouth (Patient not taking: Reported on 4/16/2024), Disp: , Rfl:     No Known Allergies    OBJECTIVE  Vitals:   Vitals:    10/31/24 0731   BP: 148/72   BP Location: Left arm   Patient Position: Sitting   Cuff Size: Large   Pulse: (!) 112   Resp: 18   Temp: 98 °F (36.7 °C)   TempSrc: Temporal   SpO2: 96%   Weight: 101 kg (221 lb 9.6 oz)   Height: 5' 9\" (1.753 m)         Physical Exam  Vitals reviewed.   Constitutional:       Appearance: He is well-developed.   HENT:      Head: Normocephalic and atraumatic.   Eyes:      Conjunctiva/sclera: Conjunctivae normal.      Pupils: Pupils are equal, round, and reactive to light.   Cardiovascular:      Rate and Rhythm: Normal rate and regular rhythm.      Heart sounds: Normal heart sounds, S1 normal " and S2 normal. No murmur heard.  Pulmonary:      Effort: Pulmonary effort is normal. No respiratory distress.      Breath sounds: Normal breath sounds. No wheezing.   Musculoskeletal:         General: Normal range of motion.      Cervical back: Normal range of motion and neck supple.   Skin:     General: Skin is warm.   Neurological:      Mental Status: He is alert and oriented to person, place, and time.   Psychiatric:         Speech: Speech normal.         Behavior: Behavior normal.         Thought Content: Thought content normal.         Judgment: Judgment normal.                    Meggan Munoz MD,   [facility]  10/31/2024

## 2024-11-11 DIAGNOSIS — R73.09 ABNORMAL GLUCOSE: ICD-10-CM

## 2024-11-11 DIAGNOSIS — I10 BENIGN HYPERTENSION: Primary | ICD-10-CM

## 2024-11-11 DIAGNOSIS — E78.2 MIXED HYPERLIPIDEMIA: ICD-10-CM

## 2024-12-26 ENCOUNTER — TELEPHONE (OUTPATIENT)
Age: 59
End: 2024-12-26

## 2024-12-26 DIAGNOSIS — I10 BENIGN HYPERTENSION: ICD-10-CM

## 2024-12-26 RX ORDER — LISINOPRIL 10 MG/1
10 TABLET ORAL DAILY
Qty: 30 TABLET | Refills: 0 | Status: SHIPPED | OUTPATIENT
Start: 2024-12-26

## 2024-12-26 NOTE — TELEPHONE ENCOUNTER
FYI: Patient BP has been 145 to 155 over 85 for the past 2 weeks with with the additional medication.

## 2024-12-26 NOTE — TELEPHONE ENCOUNTER
I would like to increase to 10mgs ( sent to pharmacy) and then see him in office for BP check in 2 weeks.

## 2025-01-09 ENCOUNTER — OFFICE VISIT (OUTPATIENT)
Dept: FAMILY MEDICINE CLINIC | Facility: CLINIC | Age: 60
End: 2025-01-09
Payer: COMMERCIAL

## 2025-01-09 VITALS
RESPIRATION RATE: 18 BRPM | WEIGHT: 214 LBS | TEMPERATURE: 98.2 F | HEIGHT: 69 IN | SYSTOLIC BLOOD PRESSURE: 120 MMHG | BODY MASS INDEX: 31.7 KG/M2 | DIASTOLIC BLOOD PRESSURE: 79 MMHG | HEART RATE: 88 BPM

## 2025-01-09 DIAGNOSIS — I10 BENIGN HYPERTENSION: Primary | ICD-10-CM

## 2025-01-09 DIAGNOSIS — E78.2 MIXED HYPERLIPIDEMIA: ICD-10-CM

## 2025-01-09 DIAGNOSIS — E11.9 TYPE 2 DIABETES MELLITUS WITHOUT COMPLICATION, WITHOUT LONG-TERM CURRENT USE OF INSULIN (HCC): ICD-10-CM

## 2025-01-09 PROCEDURE — 99214 OFFICE O/P EST MOD 30 MIN: CPT | Performed by: FAMILY MEDICINE

## 2025-01-15 NOTE — PROGRESS NOTES
Josse Castro 1965 male MRN: [id number]    FAMILY PRACTICE OFFICE VISIT  [facility] - [facility]      ASSESSMENT/PLAN  Josse Castro is a 59 y.o. male presents to the office for    Diagnoses and all orders for this visit:    Benign hypertension    Mixed hyperlipidemia    Type 2 diabetes mellitus without complication, without long-term current use of insulin (HCC)       1.  Hypertension has currently improved continue lisinopril 10 mg  2.  Hyperlipidemia continue on simvastatin 20 mg daily  3.  Discussed in great detail over patient's recent results of blood work.  Will continue on metformin.  Recommending a repeat ; pending results we will discuss treatment plan at her next visit         Future Appointments   Date Time Provider Department Center   2/27/2025  7:30 AM Meggan Munoz MD [facility]          SUBJECTIVE  CC: Follow-up and Blood Pressure Check      HPI:  Josse Castro is a 59 y.o. male who presents for an blood pressure check.  Patient overall states that he has been making significant lifestyle changes since his last blood work.  States that his BP has been stable denies any chest pain or shortness of breath.  States that he has been hoping that his numbers reduced in his 5-year checkup goes well.  Patient denies any other acute concerns today did offer to have A1c done today and patient prefers to hold off        Review of Systems   Constitutional:  Negative for activity change, appetite change, chills, fatigue and fever.   HENT:  Negative for congestion.    Respiratory:  Negative for cough, chest tightness and shortness of breath.    Cardiovascular:  Negative for chest pain and leg swelling.   Gastrointestinal:  Negative for abdominal distention, abdominal pain, constipation, diarrhea, nausea and vomiting.   All other systems reviewed and are negative.      Historical Information   The patient history was reviewed as follows:  Past Medical  "History:   Diagnosis Date   • Arthritis    • Diabetes mellitus (HCC)     type 2 diet controlled   • Diabetes mellitus, type 2 (HCC) 05/11/2022   • Gout    • Hypercholesteremia    • Hypertension    • Shingles          Medications:     Current Outpatient Medications:   •  allopurinol (ZYLOPRIM) 300 mg tablet, Take 1 tablet (300 mg total) by mouth daily, Disp: 90 tablet, Rfl: 1  •  lisinopril (ZESTRIL) 10 mg tablet, Take 1 tablet (10 mg total) by mouth daily, Disp: 30 tablet, Rfl: 0  •  metFORMIN (GLUCOPHAGE) 500 mg tablet, Take 1 tablet (500 mg total) by mouth daily with breakfast, Disp: 90 tablet, Rfl: 1  •  simvastatin (ZOCOR) 20 mg tablet, Take 1 tablet (20 mg total) by mouth daily, Disp: 90 tablet, Rfl: 1  •  amoxicillin (AMOXIL) 500 MG tablet, Take 500 mg by mouth (Patient not taking: Reported on 1/9/2025), Disp: , Rfl:     No Known Allergies    OBJECTIVE  Vitals:   Vitals:    01/09/25 0837 01/09/25 0858   BP: 140/78 120/79   BP Location: Left arm    Patient Position: Sitting    Cuff Size: Large    Pulse: 88    Resp: 18    Temp: 98.2 °F (36.8 °C)    TempSrc: Temporal    Weight: 97.1 kg (214 lb)    Height: 5' 9\" (1.753 m)          Physical Exam  Vitals reviewed.   Constitutional:       Appearance: He is well-developed.   HENT:      Head: Normocephalic and atraumatic.   Eyes:      Conjunctiva/sclera: Conjunctivae normal.      Pupils: Pupils are equal, round, and reactive to light.   Cardiovascular:      Rate and Rhythm: Normal rate and regular rhythm.      Heart sounds: Normal heart sounds.   Pulmonary:      Effort: Pulmonary effort is normal. No respiratory distress.      Breath sounds: Normal breath sounds.   Musculoskeletal:         General: Normal range of motion.      Cervical back: Normal range of motion and neck supple.   Skin:     General: Skin is warm.      Capillary Refill: Capillary refill takes less than 2 seconds.   Neurological:      Mental Status: He is alert and oriented to person, place, and time. "                    Meggan Munoz MD,   [facility]  1/15/2025

## 2025-01-18 DIAGNOSIS — R73.09 ABNORMAL GLUCOSE: ICD-10-CM

## 2025-01-18 DIAGNOSIS — M1A.49X0 OTHER SECONDARY CHRONIC GOUT OF MULTIPLE SITES WITHOUT TOPHUS: ICD-10-CM

## 2025-01-18 DIAGNOSIS — I10 BENIGN HYPERTENSION: ICD-10-CM

## 2025-01-18 DIAGNOSIS — E78.2 MIXED HYPERLIPIDEMIA: ICD-10-CM

## 2025-01-20 RX ORDER — LISINOPRIL 10 MG/1
10 TABLET ORAL DAILY
Qty: 90 TABLET | Refills: 0 | Status: SHIPPED | OUTPATIENT
Start: 2025-01-20

## 2025-01-20 RX ORDER — SIMVASTATIN 20 MG
20 TABLET ORAL DAILY
Qty: 90 TABLET | Refills: 0 | OUTPATIENT
Start: 2025-01-20

## 2025-01-20 RX ORDER — ALLOPURINOL 300 MG/1
300 TABLET ORAL DAILY
Qty: 90 TABLET | Refills: 0 | OUTPATIENT
Start: 2025-01-20

## 2025-02-11 ENCOUNTER — RESULTS FOLLOW-UP (OUTPATIENT)
Dept: FAMILY MEDICINE CLINIC | Facility: CLINIC | Age: 60
End: 2025-02-11

## 2025-02-11 LAB
ALBUMIN SERPL-MCNC: 4.5 G/DL (ref 3.6–5.1)
ALBUMIN/GLOB SERPL: 2 (CALC) (ref 1–2.5)
ALP SERPL-CCNC: 50 U/L (ref 35–144)
ALT SERPL-CCNC: 34 U/L (ref 9–46)
AST SERPL-CCNC: 20 U/L (ref 10–35)
BASOPHILS # BLD AUTO: 69 CELLS/UL (ref 0–200)
BASOPHILS NFR BLD AUTO: 1.1 %
BILIRUB SERPL-MCNC: 0.8 MG/DL (ref 0.2–1.2)
BUN SERPL-MCNC: 17 MG/DL (ref 7–25)
BUN/CREAT SERPL: ABNORMAL (CALC) (ref 6–22)
CALCIUM SERPL-MCNC: 9.6 MG/DL (ref 8.6–10.3)
CHLORIDE SERPL-SCNC: 102 MMOL/L (ref 98–110)
CHOLEST SERPL-MCNC: 135 MG/DL
CHOLEST/HDLC SERPL: 2.3 (CALC)
CO2 SERPL-SCNC: 28 MMOL/L (ref 20–32)
CREAT SERPL-MCNC: 1.05 MG/DL (ref 0.7–1.3)
EOSINOPHIL # BLD AUTO: 277 CELLS/UL (ref 15–500)
EOSINOPHIL NFR BLD AUTO: 4.4 %
ERYTHROCYTE [DISTWIDTH] IN BLOOD BY AUTOMATED COUNT: 13.1 % (ref 11–15)
EST. AVERAGE GLUCOSE BLD GHB EST-MCNC: 143 MG/DL
EST. AVERAGE GLUCOSE BLD GHB EST-SCNC: 7.9 MMOL/L
GFR/BSA.PRED SERPLBLD CYS-BASED-ARV: 82 ML/MIN/1.73M2
GLOBULIN SER CALC-MCNC: 2.2 G/DL (CALC) (ref 1.9–3.7)
GLUCOSE SERPL-MCNC: 126 MG/DL (ref 65–99)
HBA1C MFR BLD: 6.6 % OF TOTAL HGB
HCT VFR BLD AUTO: 47.2 % (ref 38.5–50)
HDLC SERPL-MCNC: 58 MG/DL
HGB BLD-MCNC: 15.9 G/DL (ref 13.2–17.1)
LDLC SERPL CALC-MCNC: 57 MG/DL (CALC)
LYMPHOCYTES # BLD AUTO: 1903 CELLS/UL (ref 850–3900)
LYMPHOCYTES NFR BLD AUTO: 30.2 %
MCH RBC QN AUTO: 31.1 PG (ref 27–33)
MCHC RBC AUTO-ENTMCNC: 33.7 G/DL (ref 32–36)
MCV RBC AUTO: 92.4 FL (ref 80–100)
MONOCYTES # BLD AUTO: 592 CELLS/UL (ref 200–950)
MONOCYTES NFR BLD AUTO: 9.4 %
NEUTROPHILS # BLD AUTO: 3459 CELLS/UL (ref 1500–7800)
NEUTROPHILS NFR BLD AUTO: 54.9 %
NONHDLC SERPL-MCNC: 77 MG/DL (CALC)
PLATELET # BLD AUTO: 219 THOUSAND/UL (ref 140–400)
PMV BLD REES-ECKER: 11.4 FL (ref 7.5–12.5)
POTASSIUM SERPL-SCNC: 4.1 MMOL/L (ref 3.5–5.3)
PROT SERPL-MCNC: 6.7 G/DL (ref 6.1–8.1)
RBC # BLD AUTO: 5.11 MILLION/UL (ref 4.2–5.8)
SODIUM SERPL-SCNC: 138 MMOL/L (ref 135–146)
TRIGL SERPL-MCNC: 117 MG/DL
WBC # BLD AUTO: 6.3 THOUSAND/UL (ref 3.8–10.8)

## 2025-02-27 ENCOUNTER — OFFICE VISIT (OUTPATIENT)
Dept: FAMILY MEDICINE CLINIC | Facility: CLINIC | Age: 60
End: 2025-02-27
Payer: COMMERCIAL

## 2025-02-27 VITALS
HEART RATE: 82 BPM | BODY MASS INDEX: 31.1 KG/M2 | DIASTOLIC BLOOD PRESSURE: 80 MMHG | OXYGEN SATURATION: 100 % | WEIGHT: 210 LBS | HEIGHT: 69 IN | RESPIRATION RATE: 18 BRPM | TEMPERATURE: 98 F | SYSTOLIC BLOOD PRESSURE: 130 MMHG

## 2025-02-27 DIAGNOSIS — E78.2 MIXED HYPERLIPIDEMIA: ICD-10-CM

## 2025-02-27 DIAGNOSIS — E11.9 TYPE 2 DIABETES MELLITUS WITHOUT COMPLICATION, WITHOUT LONG-TERM CURRENT USE OF INSULIN (HCC): Primary | ICD-10-CM

## 2025-02-27 DIAGNOSIS — I10 BENIGN HYPERTENSION: ICD-10-CM

## 2025-02-27 PROCEDURE — 99214 OFFICE O/P EST MOD 30 MIN: CPT | Performed by: FAMILY MEDICINE

## 2025-02-27 NOTE — PROGRESS NOTES
Josse Castro 1965 male MRN: [id number]    FAMILY PRACTICE OFFICE VISIT  [facility]      ASSESSMENT/PLAN  Josse Castro is a 59 y.o. male presents to the office for    Diagnoses and all orders for this visit:    Type 2 diabetes mellitus without complication, without long-term current use of insulin (HCC)  -     Hemoglobin A1C; Future    Mixed hyperlipidemia    Benign hypertension      Type 2 diabetes at this time I am very pleased to report that the patient is improving.  Per his request we will not increase metformin we will continue on diet and just metformin once a day.  Repeat his A1c in 3 months and then I will see him back in 6 months.  Hypertension currently improving continue on lisinopril 10 mg at this time  Hyperlipidemia reviewed lipid panel continue simvastatin 20 mg daily currently controlled  Recommend drinking at least 64 ounces of fluid given a slight drop in his GFR           Future Appointments   Date Time Provider Department Center   8/28/2025  8:00 AM Meggan Munoz MD [facility]          SUBJECTIVE  CC: Follow-up (A1C)      HPI:  Josse Castro is a 59 y.o. male who presents for an a follow-up appointment.  Patient states he has made tremendous changes to his diet and lifestyle.  States he also admits that he has not been sleeping given he has a wood fire and has to wake up every 3 hours.  Patient states that he is now taking supplements of garlic and cinnamon.  Denies any chest pain or shortness of breath.  Patient states that he was happy to see the results and prefers to continue doing stuff naturally and not increasing any medications  Review of Systems   Constitutional:  Negative for activity change, appetite change, chills, fatigue and fever.   HENT:  Negative for congestion.    Respiratory:  Negative for cough, chest tightness and shortness of breath.    Cardiovascular:  Negative for chest pain and leg swelling.  "  Gastrointestinal:  Negative for abdominal distention, abdominal pain, constipation, diarrhea, nausea and vomiting.   All other systems reviewed and are negative.      Historical Information   The patient history was reviewed as follows:  Past Medical History:   Diagnosis Date   • Arthritis    • Diabetes mellitus (HCC)     type 2 diet controlled   • Diabetes mellitus, type 2 (HCC) 05/11/2022   • Gout    • Hypercholesteremia    • Hypertension    • Shingles          Medications:     Current Outpatient Medications:   •  allopurinol (ZYLOPRIM) 300 mg tablet, Take 1 tablet (300 mg total) by mouth daily, Disp: 90 tablet, Rfl: 1  •  lisinopril (ZESTRIL) 10 mg tablet, Take 1 tablet (10 mg total) by mouth daily, Disp: 90 tablet, Rfl: 0  •  metFORMIN (GLUCOPHAGE) 500 mg tablet, Take 1 tablet (500 mg total) by mouth daily with breakfast, Disp: 90 tablet, Rfl: 1  •  simvastatin (ZOCOR) 20 mg tablet, Take 1 tablet (20 mg total) by mouth daily, Disp: 90 tablet, Rfl: 1  •  amoxicillin (AMOXIL) 500 MG tablet, Take 500 mg by mouth (Patient not taking: Reported on 4/16/2024), Disp: , Rfl:     No Known Allergies    OBJECTIVE  Vitals:   Vitals:    02/27/25 0734 02/27/25 0748   BP: 130/80    BP Location: Left arm    Patient Position: Sitting    Cuff Size: Large    Pulse: (!) 111 82   Resp: 18    Temp: 98 °F (36.7 °C)    TempSrc: Temporal    SpO2: 100%    Weight: 95.3 kg (210 lb)    Height: 5' 9\" (1.753 m)          Physical Exam  Vitals reviewed.   Constitutional:       Appearance: He is well-developed.   HENT:      Head: Normocephalic and atraumatic.   Eyes:      Conjunctiva/sclera: Conjunctivae normal.      Pupils: Pupils are equal, round, and reactive to light.   Cardiovascular:      Rate and Rhythm: Normal rate and regular rhythm.      Heart sounds: Normal heart sounds, S1 normal and S2 normal. No murmur heard.  Pulmonary:      Effort: Pulmonary effort is normal. No respiratory distress.      Breath sounds: Normal breath sounds. No " wheezing.   Musculoskeletal:         General: Normal range of motion.      Cervical back: Normal range of motion and neck supple.   Skin:     General: Skin is warm.   Neurological:      Mental Status: He is alert and oriented to person, place, and time.   Psychiatric:         Speech: Speech normal.         Behavior: Behavior normal.         Thought Content: Thought content normal.         Judgment: Judgment normal.                    Meggan Munoz MD,   [facility]  2/27/2025

## 2025-04-17 DIAGNOSIS — R73.09 ABNORMAL GLUCOSE: ICD-10-CM

## 2025-04-17 DIAGNOSIS — E78.2 MIXED HYPERLIPIDEMIA: ICD-10-CM

## 2025-04-17 DIAGNOSIS — M1A.49X0 OTHER SECONDARY CHRONIC GOUT OF MULTIPLE SITES WITHOUT TOPHUS: ICD-10-CM

## 2025-04-17 DIAGNOSIS — I10 BENIGN HYPERTENSION: ICD-10-CM

## 2025-04-18 RX ORDER — LISINOPRIL 10 MG/1
10 TABLET ORAL DAILY
Qty: 90 TABLET | Refills: 0 | Status: SHIPPED | OUTPATIENT
Start: 2025-04-18

## 2025-04-18 RX ORDER — ALLOPURINOL 300 MG/1
300 TABLET ORAL DAILY
Qty: 90 TABLET | Refills: 0 | Status: SHIPPED | OUTPATIENT
Start: 2025-04-18

## 2025-04-18 RX ORDER — SIMVASTATIN 20 MG
20 TABLET ORAL DAILY
Qty: 90 TABLET | Refills: 0 | Status: SHIPPED | OUTPATIENT
Start: 2025-04-18

## 2025-05-07 ENCOUNTER — RESULTS FOLLOW-UP (OUTPATIENT)
Dept: FAMILY MEDICINE CLINIC | Facility: CLINIC | Age: 60
End: 2025-05-07

## 2025-05-07 LAB — HBA1C MFR BLD: 6.2 %

## 2025-06-30 DIAGNOSIS — E11.9 TYPE 2 DIABETES MELLITUS WITHOUT COMPLICATION, WITHOUT LONG-TERM CURRENT USE OF INSULIN (HCC): Primary | ICD-10-CM

## 2025-07-23 DIAGNOSIS — R73.09 ABNORMAL GLUCOSE: ICD-10-CM

## 2025-07-23 DIAGNOSIS — E78.2 MIXED HYPERLIPIDEMIA: ICD-10-CM

## 2025-07-23 DIAGNOSIS — I10 BENIGN HYPERTENSION: ICD-10-CM

## 2025-07-23 DIAGNOSIS — M1A.49X0 OTHER SECONDARY CHRONIC GOUT OF MULTIPLE SITES WITHOUT TOPHUS: ICD-10-CM

## 2025-07-25 RX ORDER — LISINOPRIL 10 MG/1
10 TABLET ORAL DAILY
Qty: 90 TABLET | Refills: 1 | Status: SHIPPED | OUTPATIENT
Start: 2025-07-25

## 2025-07-25 RX ORDER — ALLOPURINOL 300 MG/1
300 TABLET ORAL DAILY
Qty: 90 TABLET | Refills: 1 | Status: SHIPPED | OUTPATIENT
Start: 2025-07-25

## 2025-07-25 RX ORDER — SIMVASTATIN 20 MG
20 TABLET ORAL DAILY
Qty: 90 TABLET | Refills: 1 | Status: SHIPPED | OUTPATIENT
Start: 2025-07-25

## 2025-08-13 LAB
ALBUMIN SERPL-MCNC: 4.6 G/DL (ref 3.6–5.1)
ALBUMIN/GLOB SERPL: 1.9 (CALC) (ref 1–2.5)
ALP SERPL-CCNC: 48 U/L (ref 35–144)
ALT SERPL-CCNC: 25 U/L (ref 9–46)
AST SERPL-CCNC: 16 U/L (ref 10–35)
BILIRUB SERPL-MCNC: 1.2 MG/DL (ref 0.2–1.2)
BUN SERPL-MCNC: 18 MG/DL (ref 7–25)
BUN/CREAT SERPL: ABNORMAL (CALC) (ref 6–22)
CALCIUM SERPL-MCNC: 9.6 MG/DL (ref 8.6–10.3)
CHLORIDE SERPL-SCNC: 101 MMOL/L (ref 98–110)
CO2 SERPL-SCNC: 28 MMOL/L (ref 20–32)
CREAT SERPL-MCNC: 0.99 MG/DL (ref 0.7–1.3)
EST. AVERAGE GLUCOSE BLD GHB EST-MCNC: 134 MG/DL
EST. AVERAGE GLUCOSE BLD GHB EST-SCNC: 7.4 MMOL/L
GFR/BSA.PRED SERPLBLD CYS-BASED-ARV: 88 ML/MIN/1.73M2
GLOBULIN SER CALC-MCNC: 2.4 G/DL (CALC) (ref 1.9–3.7)
GLUCOSE SERPL-MCNC: 128 MG/DL (ref 65–99)
HBA1C MFR BLD: 6.3 %
POTASSIUM SERPL-SCNC: 4.7 MMOL/L (ref 3.5–5.3)
PROT SERPL-MCNC: 7 G/DL (ref 6.1–8.1)
SODIUM SERPL-SCNC: 138 MMOL/L (ref 135–146)

## (undated) DEVICE — OSCILLATING TIP SAW CARTRIDGE: Brand: PRECISION FALCON

## (undated) DEVICE — GLOVE SRG BIOGEL 6.5

## (undated) DEVICE — RADIOLOGY STERILE LABELS: Brand: CENTURION

## (undated) DEVICE — PREP SURGICAL PURPREP 26ML

## (undated) DEVICE — PLASTIC ADHESIVE BANDAGE: Brand: CURITY

## (undated) DEVICE — 3M™ IOBAN™ 2 ANTIMICROBIAL INCISE DRAPE 6650EZ: Brand: IOBAN™ 2

## (undated) DEVICE — SUT STRATAFIX SPIRAL 3-0 PGA/PCL 30 X 30 CM SXMD2B410

## (undated) DEVICE — TRAY EPIDURAL PERIFIX 20GA X 3.5IN TUOHY 8ML

## (undated) DEVICE — GLOVE INDICATOR PI UNDERGLOVE SZ 7.5 BLUE

## (undated) DEVICE — PAD CAST 4 IN COTTON NON STERILE

## (undated) DEVICE — PACK MAJOR ORTHO W/SPLITS PBDS

## (undated) DEVICE — SYRINGE 5ML LL

## (undated) DEVICE — WIPES BABY PAMPERS SENSITIVE 36/PK

## (undated) DEVICE — FOOT SWITCH DRAPE: Brand: UNBRANDED

## (undated) DEVICE — GLOVE INDICATOR PI UNDERGLOVE SZ 6.5 BLUE

## (undated) DEVICE — NEEDLE BLUNT 18 G X 1 1/2 W FILTER

## (undated) DEVICE — SUT VICRYL 0 CP-1 27 IN J267H

## (undated) DEVICE — ANTIBACTERIAL UNDYED BRAIDED (POLYGLACTIN 910), SYNTHETIC ABSORBABLE SUTURE: Brand: COATED VICRYL

## (undated) DEVICE — BIPOLAR SEALER 23-113-1 AQM 2.3: Brand: AQUAMANTYS™

## (undated) DEVICE — SKIN MARKER DUAL TIP WITH RULER CAP, FLEXIBLE RULER AND LABELS: Brand: DEVON

## (undated) DEVICE — GLOVE SRG BIOGEL 8.5

## (undated) DEVICE — VEST SURGEON DISPOSABLE

## (undated) DEVICE — WEBRIL 6 IN UNSTERILE

## (undated) DEVICE — SMALL NEEDLE COUNTER NEST

## (undated) DEVICE — CHLORAPREP HI-LITE 10.5ML ORANGE

## (undated) DEVICE — POSITIONER HANA TABLE PACK

## (undated) DEVICE — INSTRUMENT POUCH: Brand: CONVERTORS

## (undated) DEVICE — INTENDED FOR TISSUE SEPARATION, AND OTHER PROCEDURES THAT REQUIRE A SHARP SURGICAL BLADE TO PUNCTURE OR CUT.: Brand: BARD-PARKER ® CARBON RIB-BACK BLADES

## (undated) DEVICE — NEEDLE SPINAL 25G X 3.5 IN QUINCKE

## (undated) DEVICE — GLOVE SRG BIOGEL 8

## (undated) DEVICE — TUBE MINI KAMVAC SUCTION 7310 7 LATEX FREE

## (undated) DEVICE — CHLORAPREP HI-LITE 26ML ORANGE

## (undated) DEVICE — DRESSING MEPILEX BORDER 4 X 8 IN

## (undated) DEVICE — SUT STRATAFIX SPIRAL 1-0  CT-1 30 X 30CM SXPD2B403

## (undated) DEVICE — CAPIT HIP COP - ACTIS ONLY

## (undated) DEVICE — HANDPIECE SET WITH HIGH FLOW TIP AND SUCTION TUBE: Brand: INTERPULSE

## (undated) DEVICE — DRAPE SHEET X-LG

## (undated) DEVICE — 450 ML BOTTLE OF 0.05% CHLORHEXIDINE GLUCONATE IN 99.95% STERILE WATER FOR IRRIGATION, USP AND APPLICATOR.: Brand: IRRISEPT ANTIMICROBIAL WOUND LAVAGE

## (undated) DEVICE — GLOVE INDICATOR PI UNDERGLOVE SZ 8.5 BLUE

## (undated) DEVICE — GLOVE SRG BIOGEL 7.5

## (undated) DEVICE — 3M™ STERI-DRAPE™ U-DRAPE 1015: Brand: STERI-DRAPE™

## (undated) DEVICE — ADHESIVE SKIN HIGH VISCOSITY EXOFIN 1ML

## (undated) DEVICE — SUT ETHIBOND 2 V-37 30 IN MX69G

## (undated) DEVICE — BASIC DOUBLE BASIN 2-LF: Brand: MEDLINE INDUSTRIES, INC.

## (undated) DEVICE — TOWEL SET X-RAY

## (undated) DEVICE — DRAPE C-ARM X-RAY

## (undated) DEVICE — TIBURON SPLIT SHEET: Brand: CONVERTORS

## (undated) DEVICE — ASTOUND SURGICAL GOWN, XXX LARGE, X-LONG: Brand: CONVERTORS

## (undated) DEVICE — ARTHROSCOPY FLOOR MAT

## (undated) DEVICE — ELECTRODE BLADE E-Z CLEAN 6.5IN -0014

## (undated) DEVICE — HOOD: Brand: FLYTE, SURGICOOL